# Patient Record
Sex: FEMALE | Race: WHITE | Employment: UNEMPLOYED | URBAN - METROPOLITAN AREA
[De-identification: names, ages, dates, MRNs, and addresses within clinical notes are randomized per-mention and may not be internally consistent; named-entity substitution may affect disease eponyms.]

---

## 2015-09-28 LAB — TSH SERPL-ACNC: 4.74 UIU/ML (ref 0.27–4.2)

## 2015-11-04 LAB — TSH SERPL-ACNC: 5.95 UIU/ML (ref 0.27–4.2)

## 2017-02-28 ENCOUNTER — TRANSFERRED RECORDS (OUTPATIENT)
Dept: HEALTH INFORMATION MANAGEMENT | Facility: CLINIC | Age: 52
End: 2017-02-28

## 2017-02-28 LAB
ALT SERPL-CCNC: 20 IU/L (ref 12–65)
AST SERPL-CCNC: 18 IU/L (ref 15–37)
CHOLEST SERPL-MCNC: 253 MG/DL (ref 0–199)
CREAT SERPL-MCNC: 0.73 MG/DL (ref 0.6–1.3)
GFR SERPL CREATININE-BSD FRML MDRD: >60 ML/MIN/1.73M2
GLUCOSE SERPL-MCNC: 91 MG/DL (ref 70–100)
HDLC SERPL-MCNC: 65 MG/DL (ref 40–59)
LDLC SERPL CALC-MCNC: 169 MG/DL
PHQ9 SCORE: 0
POTASSIUM SERPL-SCNC: 4.1 MMOL/L (ref 3.5–5.1)
TRIGL SERPL-MCNC: 96 MG/DL
TSH SERPL-ACNC: 2.13 UIU/ML (ref 0.36–3.74)

## 2017-10-04 ENCOUNTER — TRANSFERRED RECORDS (OUTPATIENT)
Dept: HEALTH INFORMATION MANAGEMENT | Facility: CLINIC | Age: 52
End: 2017-10-04

## 2018-02-21 ENCOUNTER — OFFICE VISIT (OUTPATIENT)
Dept: FAMILY MEDICINE | Facility: CLINIC | Age: 53
End: 2018-02-21
Payer: COMMERCIAL

## 2018-02-21 ENCOUNTER — HOSPITAL ENCOUNTER (OUTPATIENT)
Dept: GENERAL RADIOLOGY | Facility: CLINIC | Age: 53
Discharge: HOME | End: 2018-02-21
Attending: NURSE PRACTITIONER | Admitting: NURSE PRACTITIONER
Payer: COMMERCIAL

## 2018-02-21 ENCOUNTER — OFFICE VISIT (OUTPATIENT)
Dept: ORTHOPEDICS | Facility: CLINIC | Age: 53
End: 2018-02-21
Payer: COMMERCIAL

## 2018-02-21 ENCOUNTER — TELEPHONE (OUTPATIENT)
Dept: FAMILY MEDICINE | Facility: CLINIC | Age: 53
End: 2018-02-21

## 2018-02-21 VITALS
DIASTOLIC BLOOD PRESSURE: 80 MMHG | HEART RATE: 90 BPM | TEMPERATURE: 97.7 F | SYSTOLIC BLOOD PRESSURE: 126 MMHG | BODY MASS INDEX: 28.32 KG/M2 | WEIGHT: 150 LBS | OXYGEN SATURATION: 99 % | HEIGHT: 61 IN

## 2018-02-21 VITALS — HEIGHT: 61 IN | WEIGHT: 150 LBS | BODY MASS INDEX: 28.32 KG/M2 | TEMPERATURE: 97.7 F

## 2018-02-21 DIAGNOSIS — M75.81 RIGHT ROTATOR CUFF TENDINITIS: Primary | ICD-10-CM

## 2018-02-21 DIAGNOSIS — F32.5 MAJOR DEPRESSION IN COMPLETE REMISSION (H): ICD-10-CM

## 2018-02-21 DIAGNOSIS — M25.511 PAIN IN JOINT OF RIGHT SHOULDER: Primary | ICD-10-CM

## 2018-02-21 DIAGNOSIS — M25.511 PAIN IN JOINT OF RIGHT SHOULDER: ICD-10-CM

## 2018-02-21 DIAGNOSIS — E03.9 HYPOTHYROIDISM, UNSPECIFIED TYPE: ICD-10-CM

## 2018-02-21 LAB — TSH SERPL DL<=0.005 MIU/L-ACNC: 1.98 MU/L (ref 0.4–4)

## 2018-02-21 PROCEDURE — 84443 ASSAY THYROID STIM HORMONE: CPT | Performed by: NURSE PRACTITIONER

## 2018-02-21 PROCEDURE — 99204 OFFICE O/P NEW MOD 45 MIN: CPT | Mod: 25 | Performed by: ORTHOPAEDIC SURGERY

## 2018-02-21 PROCEDURE — 73030 X-RAY EXAM OF SHOULDER: CPT | Mod: TC

## 2018-02-21 PROCEDURE — 20610 DRAIN/INJ JOINT/BURSA W/O US: CPT | Mod: RT | Performed by: ORTHOPAEDIC SURGERY

## 2018-02-21 PROCEDURE — 99214 OFFICE O/P EST MOD 30 MIN: CPT | Performed by: NURSE PRACTITIONER

## 2018-02-21 PROCEDURE — 36415 COLL VENOUS BLD VENIPUNCTURE: CPT | Performed by: NURSE PRACTITIONER

## 2018-02-21 RX ORDER — SERTRALINE HYDROCHLORIDE 100 MG/1
TABLET, FILM COATED ORAL
COMMUNITY
Start: 2018-02-15 | End: 2018-05-14

## 2018-02-21 RX ORDER — LEVOTHYROXINE SODIUM 100 UG/1
100 TABLET ORAL
COMMUNITY
Start: 2018-02-15 | End: 2018-05-14

## 2018-02-21 RX ORDER — TRAMADOL HYDROCHLORIDE 50 MG/1
50-100 TABLET ORAL
Qty: 20 TABLET | Refills: 0 | Status: SHIPPED | OUTPATIENT
Start: 2018-02-21 | End: 2018-02-27

## 2018-02-21 ASSESSMENT — ANXIETY QUESTIONNAIRES
6. BECOMING EASILY ANNOYED OR IRRITABLE: NOT AT ALL
IF YOU CHECKED OFF ANY PROBLEMS ON THIS QUESTIONNAIRE, HOW DIFFICULT HAVE THESE PROBLEMS MADE IT FOR YOU TO DO YOUR WORK, TAKE CARE OF THINGS AT HOME, OR GET ALONG WITH OTHER PEOPLE: NOT DIFFICULT AT ALL
GAD7 TOTAL SCORE: 0
3. WORRYING TOO MUCH ABOUT DIFFERENT THINGS: NOT AT ALL
1. FEELING NERVOUS, ANXIOUS, OR ON EDGE: NOT AT ALL
7. FEELING AFRAID AS IF SOMETHING AWFUL MIGHT HAPPEN: NOT AT ALL
2. NOT BEING ABLE TO STOP OR CONTROL WORRYING: NOT AT ALL
5. BEING SO RESTLESS THAT IT IS HARD TO SIT STILL: NOT AT ALL

## 2018-02-21 ASSESSMENT — PATIENT HEALTH QUESTIONNAIRE - PHQ9: 5. POOR APPETITE OR OVEREATING: NOT AT ALL

## 2018-02-21 NOTE — PROGRESS NOTES
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  She enjoys riding horses and baling hay.  She has 4 horses.    She is being seen for   Chief Complaints and History of Present Illnesses   Patient presents with     Consult     rt shoulder pain          History of Present Illness:   Elisa Al is a 52 year old female who is seen in consultation at the request of Fanny Lisa APRN CNP.  History of Present illness:  Elisa presents for evaluation of:  1.) rt shoulder pain, lateral/posterior shoulder  Onset: for about 2 years, however his bone much worse the last 2 months.  Symptoms brought on by over use.  She also feels that around Cucumber time she fell forward onto her right hand and this is when the pain got worse.  Patient also remembers that approximately 2 years ago she partially dislocated her shoulder she thinks.  It went back in on its own.  Character:  sharp, dull ache, stabbing, burning, throbbing, numbness and radiation of pain down arm and into neck.    Progression of symptoms:  worse  As of 2 months ago.  Previous similar pain: YES.   Pain Level:  8/10.   Previous treatments:  ice and acetaminophen.  She feels that the ice does help and the Tylenol has not been helping.  She denies any treatment such as injections or therapy.  Currently on Blood thinners? no  Diagnosis of Diabetes? no    Additional History: Patient states that she gets catching and locking and also grinding.  Patient is having difficulty using her arm overhead and away from her body.  Patient does not have difficulty with any supination or using a screwdriver or opening a door.  Patient denies any numbness or tingling or any neck pain.  She is waking up at night  Because of the pain.    Patient's past medical, surgical, social and family histories reviewed.     Past Medical History:   Diagnosis Date     Benign neoplasm of other  specified sites of skin 4/6/2006    Finger - right third finger proximal palmar surface     Degenerative disk disease 12/18/12    anterior exposure of L4-L5 and L5-S1 disk spaces for fusion     Gout, unspecified      Other and unspecified ovarian cyst      Unspecified hypothyroidism years     Unspecified intestinal obstruction 02/02/2005    Partial bowel obstruction, Obstipation.         Past Surgical History:   Procedure Laterality Date     C LAP,ABD/PERIT/OMENTUM,UNLIST  04/12/10    pelvic adhesion     C TOTAL ABDOM HYSTERECTOMY  09/08/2004    TAHRSO, Cystoscopy.     CARPAL TUNNEL RELEASE RT/LT  2005    right wrist     EXPLORE SPINE, REMOVE HARDWARE, COMBINED  4/7/2014    Procedure: COMBINED EXPLORE SPINE, REMOVE HARDWARE;  Hardware Removal Lumbar 4-Sacral 1, Exploration of Fusion;  Surgeon: Brenton Ashford MD;  Location: [facility]     FUSION LUMBAR ANTERIOR THREE+ LEVELS       HC COLONOSCOPY W BIOPSY  04/26/10     HC COLONOSCOPY W/WO BRUSH/WASH  5/25/2005     HC EXCISION TENDON SHEATH LESION, HAND/FINGR  04/17/2006    Right middle finger flexor sheath MP joint.     HC EXCISION TENDON SHEATH LESION, HAND/FINGR  08/07/06    Right middle finger     HC LAPAROSCOPY, SURGICAL; CHOLECYSTECTOMY  4/12/2004    Cholecystectomy, Laparoscopic     HC REMOVAL OF OVARY/TUBE(S)      left ovary removed secondary to cyst     HC REMOVAL OF TONSILS,12+ Y/O      Tonsils 12+y.o.     HC REVISE MEDIAN N/CARPAL TUNNEL SURG  8/22/2005    Left     HC UGI ENDOSCOPY, SIMPLE EXAM  5/25/2005       Medications:    Current Outpatient Prescriptions on File Prior to Visit:  levothyroxine (SYNTHROID/LEVOTHROID) 100 MCG tablet Take 100 mcg by mouth   sertraline (ZOLOFT) 100 MG tablet    sertraline (ZOLOFT) 50 MG tablet    [DISCONTINUED] sertraline (ZOLOFT) 100 MG tablet TAKE ONE TABLET BY MOUTH ONCE DAILY   [DISCONTINUED] levothyroxine (SYNTHROID, LEVOTHROID) 50 MCG tablet TAKE ONE TABLET BY MOUTH ONCE DAILY   TYLENOL EXTRA STRENGTH 500 MG OR TABS  "2 tablets daily PRN     No current facility-administered medications on file prior to visit.     Allergies   Allergen Reactions     Aspirin Swelling     Erythromycin Ethylsuccinate GI Disturbance     gi upset     Ibuprofen Swelling     Naproxen      Zelnorm [Tegaserod Maleate]        Social History     Occupational History      [employer]     Social History Main Topics     Smoking status: Current Some Day Smoker     Years: 23.00     Smokeless tobacco: Former User     Quit date: 1/19/2013      Comment: one pack every 1 month-only on weekends     Alcohol use 0.0 oz/week      Comment: rarely     Drug use: No     Sexual activity: Yes     Partners: Male       Family History   Problem Relation Age of Onset     Blood Disease Mother      leukemia-passed 2008     Cardiovascular Father      triple bypass     Respiratory Father      Black Lung     Alzheimer Disease Father      CANCER Maternal Grandmother      ovarian/brain ca       REVIEW OF SYSTEMS  10 point review systems performed otherwise negative as noted as per history of present illness.    Physical Exam:  Vitals: Temp 97.7  F (36.5  C)  Ht 1.549 m (5' 1\")  Wt 68 kg (150 lb)  LMP 01/13/2004  BMI 28.34 kg/m2  BMI= Body mass index is 28.34 kg/(m^2).    Constitutional: healthy, alert and no acute distress   Psychiatric: mentation appears normal and affect normal/bright  NEURO: no focal deficits, CMS intact right upper extremity  RESP: Normal with easy respirations and no use of accessory muscles to breathe, no audible wheezing or retractions  CV: +2 radial pulse and her hand is warm to plapation.   SKIN: No erythema, rashes, excoriation, or breakdown. No evidence of infection.   MUSCULOSKELETAL:    INSPECTION of right shoulder: No gross deformities, erythema, edema, ecchymosis, atrophy or fasciculations.     PALPATION: No tenderness to palpation of the AC joint, proximal biceps tendon, clavicle, lateral shoulder, posterior shoulder, trapezius area. No increased " warmth noted.     ROM: Forward flexion to approximately 170 , abduction to approximately 170 , external rotation to approximately 70 , internal rotation to lumbar spine.  All range of motion is without catching, locking or pain.      STRENGTH: 5 out of 5 , deltoid as well as internal and external rotation     SPECIAL TEST: Patient has a negative Neer test, negative Rawls sign, negative cross over test, negative belly press and negative liftoff test, negative empty can and full can test, negative external rotator lag sign, negative O'lila's test, negative Crank test.  Negative speeds test  GAIT: non-antalgic  Lymph: no palpable lymph nodes    Diagnostic Modalities:  Recent Results (from the past 744 hour(s))   XR Shoulder Right G/E 3 Views    Narrative    RIGHT SHOULDER THREE OR MORE VIEWS   2/21/2018 10:13 AM     HISTORY:  Pain and limited ROM (range of motion) right shoulder.  History of overuse, heavy lifting. Pain in joint of right shoulder.    COMPARISON: None.     FINDINGS:  There is no fracture. The humeral head is well located  within the glenoid fossa. Glenohumeral, acromioclavicular, and  coracoclavicular spaces are well maintained. Visualized portions of  the adjacent lung are clear. Stimulator wires projected over the lower  thoracic spine on one of the images.      Impression    IMPRESSION:  Essentially negative right shoulder x-rays. If there is  clinical concern for rotator cuff or other soft tissue pathology,  further evaluation with MRI may be helpful.    PARESH MANZO MD     We agree with the above reading  Independent visualization of the images was performed.    Impression: 1.  Right shoulder rotator cuff tendinitis.    Plan:  All of the above pertinent physical exam and imaging modalities findings was reviewed with Elisa.                                          INJECTION PROCEDURE:  The patient was counseled about an  injection, including discussion of risks (including infection),  contents of the injection, rationale for performing the injection, and expected benefits of the injection. The skin was prepped with alcohol and betadine and then utilizing sterile technique an injection of the right shoulder subacromial space from the posterolateral approach  was performed. I used francia chloride spray prior to doing the injection. The injection consisted 1ml of Kenalog (40mg per 1ml) with 8ml 1% lidocaine plain. The patient tolerated the injection well, and there were no complications. The injection site was covered with a Band-Aid. The injection was performed by Slim Sagastume PA-C     Patient Instructions:  1.  Your x-rays look excellent  2.  On exam it does feel like your rotator cuff is irritated however I do not believe you have a rotator cuff tear because her strength is good.  3.  It is possible that you could have subluxed or partially dislocated her shoulder in the past however it seems like it is your rotator cuff that is the issue today.  4. We decided to do a cortisone injection today.  This is like putting very powerful ibuprofen right to the area it is needed.  We have information about the injection below.   5. We discussed the Mobic as a prescription anti-inflammatory medication you could also try but we decided to hold off on that today.    6.  Continue Tylenol and ice as needed.  7.  We have exercises below for you to try.  8.  We did 20 tablets of tramadol today to take before bed.    9.  You can followup with Di Zavala MD in 6 weeks, if you are having pain at that time we can do a cortisone injection.  You can always cancel the appointment if you are doing really well and follow-up as needed.   Re-x-ray on return: No    Scribed by Miguel Angel Sagastume PA-C on 2/21/2018 at 4:20 PM, based on Dr. Di Zavala's statements to me.    This note was dictated with [employer].    MILLIE Sánchez MD  [email or web address]

## 2018-02-21 NOTE — NURSING NOTE
"Chief Complaint   Patient presents with     Consult     rt shoulder pain        Initial Temp 97.7  F (36.5  C)  Ht 1.549 m (5' 1\")  Wt 68 kg (150 lb)  LMP 01/13/2004  BMI 28.34 kg/m2 Estimated body mass index is 28.34 kg/(m^2) as calculated from the following:    Height as of this encounter: 1.549 m (5' 1\").    Weight as of this encounter: 68 kg (150 lb).  Medication Reconciliation: complete    BP completed using cuff size: NA (Not Taken)    Sierra Layne MA      "

## 2018-02-21 NOTE — PATIENT INSTRUCTIONS
Encounter Diagnosis   Name Primary?     Right rotator cuff tendinitis Yes     Rest, ice and elevate above heart level as needed for pain control  1.  Your x-rays look excellent  2.  On exam it does feel like your rotator cuff is irritated however I do not believe you have a rotator cuff tear because her strength is good.  3.  It is possible that you could have subluxed or partially dislocated her shoulder in the past however it seems like it is your rotator cuff that is the issue today.  4. We decided to do a cortisone injection today.  This is like putting very powerful ibuprofen right to the area it is needed.  We have information about the injection below.   5. We discussed the Mobic as a prescription anti-inflammatory medication you could also try but we decided to hold off on that today.    6.  Continue Tylenol and ice as needed.  7.  We have exercises below for you to try.  8.  We did 20 tablets of tramadol today to take before bed.    9.  You can followup with Di Zavala MD in 6 weeks, if you are having pain at that time we can do a cortisone injection.  You can always cancel the appointment if you are doing really well and follow-up as needed.   Cortisone Instructions:     1. You received an injection of cortisone into your right shoulder today.  2. The joint(s) may be more painful for the first 1-2 days.  3. We ask you to continue to rest the joint(s) for a few more days before resuming regular activities.  4. Pain Medications you can take (as long as your primary care provider allows these meds and you do not have kidney or liver conditions):  Tylenol  Take 1000 mg by mouth every 6 hours as needed; maximum dose 4000 mg a day  Ibuprofen  600 mg every 6 hours as needed; maximum 2400 mg a day  (OK to take tylenol and ibuprofen at the same time)  5. Rest, ice and elevate as needed for pain control  6. Watch for these signs of infection: redness, swelling, drainage, warmth to touch, increased pain, or  fever. Call the clinic or make an appointment to be seen if you think you have an infection.  7. If you are diabetic, make sure you keep a close eye on your blood sugars, they can get elevated with cortisone injections.   8. Sometimes it can take 1-2 weeks for it to reach its full effect.    Cortisone Injections  Cortisone is a type of steroid. It can greatly reduce swelling, redness, and irritation (inflammation) and pain. Being injected with cortisone is simple and doesn t take long. Your doctor may ask you questions about your health. Certain health conditions, such as diabetes, can be affected by cortisone.     Your pain may be relieved by a cortisone injection.   Why have a cortisone injection?  Injecting cortisone can relieve pain for anything from a sports injury to arthritis. Your doctor may suggest an injection if rest, splints, or oral medicine doesn t relieve your pain. Injecting cortisone is simpler than having surgery. And cortisone may provide the lasting pain relief that can help you get out and enjoy life again.  Getting the injection  Your doctor will start by cleaning and occasionally numbing your skin at the injection site. Next you ll be injected with local anesthetics (for short-term pain relief) and cortisone. The injection may last a few moments. A small bandage will be put over the injection site. You ll then be ready to go home.  After your injection  After being injected, make sure you don t injure the treated region. But stay active. Enjoy a walk or some other mild activity. Just be careful not to strain the region that gave you trouble.  The next day  Some people feel more pain after being injected. This is normal, and it will go away soon. Applying ice for 20 minutes at a time to your injury may reduce the increased pain. Rest for the first day or two. You don t need to stay in bed. But avoid tasks that may strain the injured region.  If you have diabetes  Cortisone injections can cause  blood sugar to be increased for several days after the injection. If you have diabetes, you should follow your blood  sugar closely during this time. Follow your regular plan for what to do when your blood sugar is elevated.     4855-8449 [employer]. [address] [place] [address], [place] [address]. All rights reserved. This information is not intended as a substitute for professional medical care. Always follow your healthcare professional's instructions.     [employer] and [employer] may offer reliable information regarding your diagnosis and treatment plan.    THANK YOU for coming in today. If you receive a survey via [employer] or mail please let us know if there was anything you especially appreciated today or if there is any way we can improve our clinic. We appreciate your input.    GENERAL INFORMATION:  Our hours are:  Monday :     Clinic 7:30 AM-430 PM ([facility])  Tuesday:      Operating Room All Day ([facility])  Wednesday: Clinic 7:30 AM - 11:15 AM ([facility])             Clinic 1:00 PM - 4:00PM ([facility])  Thursday:     Administrative Day  Friday:          Clinic 7:30 AM - 11:15 AM ([facility])            Clinic 1:00 PM - 4:00 PM ([facility])      Bone and Joint Service Line for any issues or concerns: [phone]      We are not in the office Thursdays. Therefore non- urgent calls and medical messages received on Thursday will be addressed when we are back in the office on Wednesday. Urgent matters will be reviewed and addressed by one of our partners in the office as needed.    If lab work was done today as part of your evaluation you will generally be contacted via [employer], mail, or phone with the results within 1-5 days. If there is an alarming result we will contact you by phone. Lab results come back at varying times, I generally wait until all labs are  resulted before making comments on results. Please note labs are automatically released to [employer] (if you have signed up for it) once available-at times you may see these prior to my having a chance to review them as well.    If you need refills please contact your pharmacist. They will send a refill request to me to review. Please allow 3 business days for us to process all refill requests. All narcotic refills should be handled in the clinic at the time of your visit.  Understanding Rotator Cuff Injuries  The rotator cuff is a team of muscles and connecting tendons in the shoulder. It attaches your upper arm to your shoulder blade. Your rotator cuff helps you reach, throw, push, pull, and lift. Without it, your shoulder can't do its job properly.        Overuse tendonitis is irritation, bruising, or fraying of the rotator cuff.        A healthy rotator cuff  A healthy rotator cuff gives your shoulder flexibility and control. The rotator cuff holds your upper arm bone (humerus) in your shoulder socket (glenoid). It also helps move the shoulder.  A damaged rotator cuff  Pain and weakness told you that something was wrong with your shoulder. Now you know it s a rotator cuff problem. Rotator cuff tendons can become damaged or inflamed. This is called tendonitis. Possible causes include:    Irritation from overuse    Bursal inflammation (bursitis)    Pinching (impingement)    Calcium deposits (calcification)    Tears in the tendon.  Getting your shoulder healthy again  Care for your shoulder will most likely begin with nonsurgical treatments. You may start with simple rest. If needed, you may have injections that decrease inflammation and pain. Your healthcare provider will tell you how often you may need these treatments. If rest and injections relieve your pain, you will be given an exercise program. This will help restore your shoulder s strength and function. If your pain continues, your healthcare provider may  suggest surgery to repair the rotator cuff.    0406-8827 [employer]. [address] [place] [address], [place] [address]. All rights reserved. This information is not intended as a substitute for professional medical care. Always follow your healthcare professional's instructions.      Tendonitis  A tendon is the thick fibrous cord that joins muscle to bone and allows joints to move. When a tendon becomes inflamed, it is called tendonitis. This can occur from overuse, injury, or infection. This usually involves the shoulders, forearm, wrist, hands and foot. Symptoms include pain, swelling and tenderness to the touch. Moving the joint increases the pain.  It takes 4 to 6 weeks for tendonitis to heal. It is treated by preventing motion of the tendon with a splint or brace and the use of anti-inflammatory medicine.  Home care    Some people find relief with ice packs. These can be crushed or cubed ice in a plastic bag or a bag of frozen vegetables wrapped in a thin towel. Other people get better relief with heat. This can include a hot shower, hot bath, or a moist towel warmed in a microwave. Try each and use the method that feels best, for 15 to 20 minutes several times a day.    Rest the inflamed joint and protect it from movement.    You may use over-the-counter ibuprofen or naproxen to treat pain and inflammation, unless another medicine was prescribed. If you can't take these medicines, acetaminophen may help with the pain, but does not treat inflammation. If you have chronic liver or kidney disease or ever had a stomach ulcer or gastrointestinal bleeding, talk with your doctor before using these medicines.    As your symptoms improve, begin gradual motion at the involved joint.  Follow-up care  Follow up with your healthcare provider if you are not improving after 5 days of treatment.  When to seek medical advice  Call your healthcare provider right away if any of these occur:    Redness over the painful  area    Increasing pain or swelling at the joint    Fever (1 degree above your normal temperature) lasting 24 to 48 hours Or, whatever your healthcare provider told you to report based on your condition    [id number] [employer]. [address] [place] [address], [place] [address]. All rights reserved. This information is not intended as a substitute for professional medical care. Always follow your healthcare professional's instructions.      Exercises for Shoulder Flexibility: External Rotation  This stretch can help restore shoulder flexibility and relieve pain over time. When stretching, be sure to breathe deeply. Follow any special instructions from your doctor or physical therapist:     a doorway. Grasp the doorjamb with the hand on the frozen side. Your arm should be bent.    With the other hand, hold the elbow on the frozen side firmly against your body.    Standing in the same spot, rotate your body away from the doorjamb. Stop when you feel the stretch in the shoulder. At first, try to hold the stretch for 5 seconds.    Work up to doing 3 sets of this stretch, 3 times a day. Work up to holding the stretch for 30 to 60 seconds.  Note: Keep your arms as still as you can. Over time, rotate your body a little more to enhance the stretch. But be careful not to twist your back.        Exercises for Shoulder Flexibility: Internal Rotation    This stretch can help restore shoulder flexibility and relieve pain over time. When stretching, be sure to breathe deeply. Follow any special instructions from your healthcare provider or physical therapist.    While seated, move the arm on the side you want to stretch toward the middle of your back. The palm of your hand should face out.    Cup your other hand under the hand that s behind your back. Gently push your cupped hand upward until you feel the stretch in the shoulder. Try to hold the stretch for 5 seconds.    Work up to doing 3 sets of this  stretch, 3 times a day. Work up to holding the stretch for 30 to 60 seconds.  Note: Keep your back straight. It s OK if your hand can t reach the middle of your back. Instead, start the stretch with your hand as close as you can get it to the middle of your back.      Exercises for Shoulder Flexibility: Wall Walk  Improving your flexibility can reduce pain. Stretching exercises also can help increase your range of pain-free motion. Breathe normally when you exercise. Use smooth, fluid movements.  Note: Follow any special instructions you are given. If you feel pain, stop the exercise. If the pain continues after stopping, call your healthcare provider:    Stand with your shoulder about 2 feet from the wall.    Raise your arm to shoulder level and gently  walk  your fingers up the wall as high as you can.    Hold for a few seconds. Then walk your fingers back down.    Repeat 3 times. Move closer to the wall as you repeat.    Build up to holding each stretch for 30 seconds.  Caution: Do this stretch only if your healthcare provider recommends it. Don t do it when you are first injured.            9197-3130 [employer]. [address] [place], [place] [id number]. All rights reserved. This information is not intended as a substitute for professional medical care. Always follow your healthcare professional's instructions.    Shoulder Exercises: External Rotation  Strengthening exercises help make your injured shoulder more stable. To warm up, do flexibility (stretching) exercises first. Your healthcare provider will tell you what size hand weights to use for the strengthening exercise below. If you don t have hand weights, try using cans of soup instead:    Lie on your uninjured side with your head supported by a pillow or your arm. Place a small rolled-up towel under your top elbow.    Grasp a hand weight with your top hand and bend that arm to a right angle, resting your forearm against your  stomach.    Keeping your elbow against the towel, slowly lift the weight until your forearm is slightly higher than your elbow. Return to the starting position. Repeat.    Work up to 5 to 15 lifts.    4074-2351 [employer]. [address] [place] [address]. All rights reserved. This information is not intended as a substitute for professional medical care. Always follow your healthcare professional's instructions.        Shoulder Exercises: Internal Rotation    Strengthening exercises help make your injured shoulder more stable. To warm up, do flexibility (stretching) exercises first. Your healthcare provider will tell you what size hand weights to use for the strengthening exercise below. If you don t have hand weights, try using cans of soup instead:    With knees bent, lie on a firm surface. Using the hand on the same side as your injured shoulder, grasp a weight. Bend that arm to a right angle (90 degrees).    Rest your elbow on the floor.    Keeping your elbow next to your side, lower your forearm toward the floor, away from your body. Do not lower your hand all the way to the floor.    Slowly return your forearm to your side. Repeat.    Work up to 5 to 15 lifts.     Note: Support your head and neck with a pillow.     [id number] [employer]. [address] [place] [address]. All rights reserved. This information is not intended as a substitute for professional medical care. Always follow your healthcare professional's instructions.        Shoulder Exercises: Side Raise    This exercise stretches and strengthens your shoulders. Before starting, read through all the instructions. During the exercise, breathe normally and use smooth movements. Stop if you feel any pain. If pain persists, call your healthcare provider.    Stand straight, holding a ____ pound weight in each hand, arms at sides, feet shoulder-width apart.    Slowly extend your arms up and out until weights  are at shoulder level. Slowly return to starting position.    Repeat ____ times. Do ____ sets ____ times a day.     CAUTION: Don t swing the weights or raise weights above shoulder level.     0309-3122 [employer]. [address] [place] [address], [place] [address]. All rights reserved. This information is not intended as a substitute for professional medical care. Always follow your healthcare professional's instructions.

## 2018-02-21 NOTE — TELEPHONE ENCOUNTER
----- Message from FLORENCIA Bonds CNP sent at 2/21/2018  3:52 PM CST -----  Please let the patient know her x-ray of the shoulder was essentially negative.  There is no dislocation or fracture.  No calcific tendinitis or bursitis.  I suspect probable rotator cuff injury.  Follow-up with Ortho as discussed in clinic

## 2018-02-21 NOTE — TELEPHONE ENCOUNTER
Patient called back and info was given.  Thank you,  Nicky Jacobs   for [facility]

## 2018-02-21 NOTE — PROGRESS NOTES
Elisa Al is a 52 year old female who is seen in consultation at the request of Fanny Lisa, APRN CNP.  History of Present illness:  Elisa presents for evaluation of:  1.) rt shoulder pain   Onset: for about 2 years  Symptoms brought on by over use.   Character:  sharp, dull ache, stabbing, burning, throbbing, numbness and radiation of pain down arm and into neck.    Progression of symptoms:  worse.    Previous similar pain: YES.   Pain Level:  8/10.   Previous treatments:  ice and acetaminophen.  Currently on Blood thinners? no  Diagnosis of Diabetes? no
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
## 2018-02-21 NOTE — MR AVS SNAPSHOT
After Visit Summary   2/21/2018    Elisa Al    MRN: [id number]           Patient Information     Date Of Birth          1965        Visit Information        Provider Department      2/21/2018 3:00 PM Di Zavala MD [facility]        Today's Diagnoses     Right rotator cuff tendinitis    -  1      Care Instructions    Encounter Diagnosis   Name Primary?     Right rotator cuff tendinitis Yes     Rest, ice and elevate above heart level as needed for pain control  1.  Your x-rays look excellent  2.  On exam it does feel like your rotator cuff is irritated however I do not believe you have a rotator cuff tear because her strength is good.  3.  It is possible that you could have subluxed or partially dislocated her shoulder in the past however it seems like it is your rotator cuff that is the issue today.  4. We decided to do a cortisone injection today.  This is like putting very powerful ibuprofen right to the area it is needed.  We have information about the injection below.   5. We discussed the Mobic as a prescription anti-inflammatory medication you could also try but we decided to hold off on that today.    6.  Continue Tylenol and ice as needed.  7.  We have exercises below for you to try.  8. You can followup with Di Zavala MD in 6 weeks, if you are having pain at that time we can do a cortisone injection.  You can always cancel the appointment if you are doing really well and follow-up as needed.   Cortisone Instructions:     1. You received an injection of cortisone into your right shoulder today.  2. The joint(s) may be more painful for the first 1-2 days.  3. We ask you to continue to rest the joint(s) for a few more days before resuming regular activities.  4. Pain Medications you can take (as long as your primary care provider allows these meds and you do not have kidney or liver conditions):  Tylenol  Take 1000 mg by mouth every 6 hours as needed;  maximum dose 4000 mg a day  Ibuprofen  600 mg every 6 hours as needed; maximum 2400 mg a day  (OK to take tylenol and ibuprofen at the same time)  5. Rest, ice and elevate as needed for pain control  6. Watch for these signs of infection: redness, swelling, drainage, warmth to touch, increased pain, or fever. Call the clinic or make an appointment to be seen if you think you have an infection.  7. If you are diabetic, make sure you keep a close eye on your blood sugars, they can get elevated with cortisone injections.   8. Sometimes it can take 1-2 weeks for it to reach its full effect.    Cortisone Injections  Cortisone is a type of steroid. It can greatly reduce swelling, redness, and irritation (inflammation) and pain. Being injected with cortisone is simple and doesn t take long. Your doctor may ask you questions about your health. Certain health conditions, such as diabetes, can be affected by cortisone.     Your pain may be relieved by a cortisone injection.   Why have a cortisone injection?  Injecting cortisone can relieve pain for anything from a sports injury to arthritis. Your doctor may suggest an injection if rest, splints, or oral medicine doesn t relieve your pain. Injecting cortisone is simpler than having surgery. And cortisone may provide the lasting pain relief that can help you get out and enjoy life again.  Getting the injection  Your doctor will start by cleaning and occasionally numbing your skin at the injection site. Next you ll be injected with local anesthetics (for short-term pain relief) and cortisone. The injection may last a few moments. A small bandage will be put over the injection site. You ll then be ready to go home.  After your injection  After being injected, make sure you don t injure the treated region. But stay active. Enjoy a walk or some other mild activity. Just be careful not to strain the region that gave you trouble.  The next day  Some people feel more pain after being  injected. This is normal, and it will go away soon. Applying ice for 20 minutes at a time to your injury may reduce the increased pain. Rest for the first day or two. You don t need to stay in bed. But avoid tasks that may strain the injured region.  If you have diabetes  Cortisone injections can cause blood sugar to be increased for several days after the injection. If you have diabetes, you should follow your blood  sugar closely during this time. Follow your regular plan for what to do when your blood sugar is elevated.     8900-9522 [employer]. [address] [place] [address]. All rights reserved. This information is not intended as a substitute for professional medical care. Always follow your healthcare professional's instructions.     [employer] and [employer] may offer reliable information regarding your diagnosis and treatment plan.    THANK YOU for coming in today. If you receive a survey via [employer] or mail please let us know if there was anything you especially appreciated today or if there is any way we can improve our clinic. We appreciate your input.    GENERAL INFORMATION:  Our hours are:  Monday :     Clinic 7:30 AM-430 PM ([facility])  Tuesday:      Operating Room All Day ([facility])  Wednesday: Clinic 7:30 AM - 11:15 AM ([facility])             Clinic 1:00 PM - 4:00PM ([facility])  Thursday:     Administrative Day  Friday:          Clinic 7:30 AM - 11:15 AM ([facility])            Clinic 1:00 PM - 4:00 PM ([facility])      Bone and Joint Service Line for any issues or concerns: [phone]      We are not in the office Thursdays. Therefore non- urgent calls and medical messages received on Thursday will be addressed when we are back in the office on Wednesday. Urgent matters will be reviewed and addressed by one of our partners in the office as  needed.    If lab work was done today as part of your evaluation you will generally be contacted via [employer], mail, or phone with the results within 1-5 days. If there is an alarming result we will contact you by phone. Lab results come back at varying times, I generally wait until all labs are resulted before making comments on results. Please note labs are automatically released to [employer] (if you have signed up for it) once available-at times you may see these prior to my having a chance to review them as well.    If you need refills please contact your pharmacist. They will send a refill request to me to review. Please allow 3 business days for us to process all refill requests. All narcotic refills should be handled in the clinic at the time of your visit.  Understanding Rotator Cuff Injuries  The rotator cuff is a team of muscles and connecting tendons in the shoulder. It attaches your upper arm to your shoulder blade. Your rotator cuff helps you reach, throw, push, pull, and lift. Without it, your shoulder can't do its job properly.        Overuse tendonitis is irritation, bruising, or fraying of the rotator cuff.        A healthy rotator cuff  A healthy rotator cuff gives your shoulder flexibility and control. The rotator cuff holds your upper arm bone (humerus) in your shoulder socket (glenoid). It also helps move the shoulder.  A damaged rotator cuff  Pain and weakness told you that something was wrong with your shoulder. Now you know it s a rotator cuff problem. Rotator cuff tendons can become damaged or inflamed. This is called tendonitis. Possible causes include:    Irritation from overuse    Bursal inflammation (bursitis)    Pinching (impingement)    Calcium deposits (calcification)    Tears in the tendon.  Getting your shoulder healthy again  Care for your shoulder will most likely begin with nonsurgical treatments. You may start with simple rest. If needed, you may have injections that decrease  inflammation and pain. Your healthcare provider will tell you how often you may need these treatments. If rest and injections relieve your pain, you will be given an exercise program. This will help restore your shoulder s strength and function. If your pain continues, your healthcare provider may suggest surgery to repair the rotator cuff.    8783-9357 [employer] [address] [place] [address]. All rights reserved. This information is not intended as a substitute for professional medical care. Always follow your healthcare professional's instructions.      Tendonitis  A tendon is the thick fibrous cord that joins muscle to bone and allows joints to move. When a tendon becomes inflamed, it is called tendonitis. This can occur from overuse, injury, or infection. This usually involves the shoulders, forearm, wrist, hands and foot. Symptoms include pain, swelling and tenderness to the touch. Moving the joint increases the pain.  It takes 4 to 6 weeks for tendonitis to heal. It is treated by preventing motion of the tendon with a splint or brace and the use of anti-inflammatory medicine.  Home care    Some people find relief with ice packs. These can be crushed or cubed ice in a plastic bag or a bag of frozen vegetables wrapped in a thin towel. Other people get better relief with heat. This can include a hot shower, hot bath, or a moist towel warmed in a microwave. Try each and use the method that feels best, for 15 to 20 minutes several times a day.    Rest the inflamed joint and protect it from movement.    You may use over-the-counter ibuprofen or naproxen to treat pain and inflammation, unless another medicine was prescribed. If you can't take these medicines, acetaminophen may help with the pain, but does not treat inflammation. If you have chronic liver or kidney disease or ever had a stomach ulcer or gastrointestinal bleeding, talk with your doctor before using these medicines.    As  your symptoms improve, begin gradual motion at the involved joint.  Follow-up care  Follow up with your healthcare provider if you are not improving after 5 days of treatment.  When to seek medical advice  Call your healthcare provider right away if any of these occur:    Redness over the painful area    Increasing pain or swelling at the joint    Fever (1 degree above your normal temperature) lasting 24 to 48 hours Or, whatever your healthcare provider told you to report based on your condition    7634-4975 [employer]. [address] [place] [address], [place] [id number]. All rights reserved. This information is not intended as a substitute for professional medical care. Always follow your healthcare professional's instructions.      Exercises for Shoulder Flexibility: External Rotation  This stretch can help restore shoulder flexibility and relieve pain over time. When stretching, be sure to breathe deeply. Follow any special instructions from your doctor or physical therapist:     a doorway. Grasp the doorjamb with the hand on the frozen side. Your arm should be bent.    With the other hand, hold the elbow on the frozen side firmly against your body.    Standing in the same spot, rotate your body away from the doorjamb. Stop when you feel the stretch in the shoulder. At first, try to hold the stretch for 5 seconds.    Work up to doing 3 sets of this stretch, 3 times a day. Work up to holding the stretch for 30 to 60 seconds.  Note: Keep your arms as still as you can. Over time, rotate your body a little more to enhance the stretch. But be careful not to twist your back.        Exercises for Shoulder Flexibility: Internal Rotation    This stretch can help restore shoulder flexibility and relieve pain over time. When stretching, be sure to breathe deeply. Follow any special instructions from your healthcare provider or physical therapist.    While seated, move the arm on the side you want to stretch  toward the middle of your back. The palm of your hand should face out.    Cup your other hand under the hand that s behind your back. Gently push your cupped hand upward until you feel the stretch in the shoulder. Try to hold the stretch for 5 seconds.    Work up to doing 3 sets of this stretch, 3 times a day. Work up to holding the stretch for 30 to 60 seconds.  Note: Keep your back straight. It s OK if your hand can t reach the middle of your back. Instead, start the stretch with your hand as close as you can get it to the middle of your back.      Exercises for Shoulder Flexibility: Wall Walk  Improving your flexibility can reduce pain. Stretching exercises also can help increase your range of pain-free motion. Breathe normally when you exercise. Use smooth, fluid movements.  Note: Follow any special instructions you are given. If you feel pain, stop the exercise. If the pain continues after stopping, call your healthcare provider:    Stand with your shoulder about 2 feet from the wall.    Raise your arm to shoulder level and gently  walk  your fingers up the wall as high as you can.    Hold for a few seconds. Then walk your fingers back down.    Repeat 3 times. Move closer to the wall as you repeat.    Build up to holding each stretch for 30 seconds.  Caution: Do this stretch only if your healthcare provider recommends it. Don t do it when you are first injured.            1743-2435 [employer]. [address] [place] [address], [place] [address]. All rights reserved. This information is not intended as a substitute for professional medical care. Always follow your healthcare professional's instructions.    Shoulder Exercises: External Rotation  Strengthening exercises help make your injured shoulder more stable. To warm up, do flexibility (stretching) exercises first. Your healthcare provider will tell you what size hand weights to use for the strengthening exercise below. If you don t have hand weights,  try using cans of soup instead:    Lie on your uninjured side with your head supported by a pillow or your arm. Place a small rolled-up towel under your top elbow.    Grasp a hand weight with your top hand and bend that arm to a right angle, resting your forearm against your stomach.    Keeping your elbow against the towel, slowly lift the weight until your forearm is slightly higher than your elbow. Return to the starting position. Repeat.    Work up to 5 to 15 lifts.    0617-4119 [employer]. [address] [place] [address]. All rights reserved. This information is not intended as a substitute for professional medical care. Always follow your healthcare professional's instructions.        Shoulder Exercises: Internal Rotation    Strengthening exercises help make your injured shoulder more stable. To warm up, do flexibility (stretching) exercises first. Your healthcare provider will tell you what size hand weights to use for the strengthening exercise below. If you don t have hand weights, try using cans of soup instead:    With knees bent, lie on a firm surface. Using the hand on the same side as your injured shoulder, grasp a weight. Bend that arm to a right angle (90 degrees).    Rest your elbow on the floor.    Keeping your elbow next to your side, lower your forearm toward the floor, away from your body. Do not lower your hand all the way to the floor.    Slowly return your forearm to your side. Repeat.    Work up to 5 to 15 lifts.     Note: Support your head and neck with a pillow.     2000-1979 [employer]. [address] [place] [address]. All rights reserved. This information is not intended as a substitute for professional medical care. Always follow your healthcare professional's instructions.        Shoulder Exercises: Side Raise    This exercise stretches and strengthens your shoulders. Before starting, read through all the instructions. During the  "exercise, breathe normally and use smooth movements. Stop if you feel any pain. If pain persists, call your healthcare provider.    Stand straight, holding a ____ pound weight in each hand, arms at sides, feet shoulder-width apart.    Slowly extend your arms up and out until weights are at shoulder level. Slowly return to starting position.    Repeat ____ times. Do ____ sets ____ times a day.     CAUTION: Don t swing the weights or raise weights above shoulder level.     0472-6199 [employer]. [address] [place] [address]. All rights reserved. This information is not intended as a substitute for professional medical care. Always follow your healthcare professional's instructions.                  Follow-ups after your visit        Future tests that were ordered for you today     Open Future Orders        Priority Expected Expires Ordered    XR Shoulder Right G/E 3 Views Routine 2/21/2018 2/21/2019 2/21/2018            Who to contact     If you have questions or need follow up information about today's clinic visit or your schedule please contact [facility] directly at [phone].  Normal or non-critical lab and imaging results will be communicated to you by [employer], letter or phone within 4 business days after the clinic has received the results. If you do not hear from us within 7 days, please contact the clinic through [employer] or phone. If you have a critical or abnormal lab result, we will notify you by phone as soon as possible.  Submit refill requests through [employer] or call your pharmacy and they will forward the refill request to us. Please allow 3 business days for your refill to be completed.          Additional Information About Your Visit        [employer] Information     [employer] lets you send messages to your doctor, view your test results, renew your prescriptions, schedule appointments and more. To sign up, go to [email or web address] . Click on \"Log in\" on " "the left side of the screen, which will take you to the Welcome page. Then click on \"Sign up Now\" on the right side of the page.     You will be asked to enter the access code listed below, as well as some personal information. Please follow the directions to create your username and password.     Your access code is: [email or web address]  Expires: 5/15/2018  4:40 PM     Your access code will  in 90 days. If you need help or a new code, please call your [facility] clinic or [phone].        Care EveryWhere ID     This is your Care EveryWhere ID. This could be used by other organizations to access your [facility] medical records  [id number]        Your Vitals Were     Temperature Height Last Period BMI (Body Mass Index)          97.7  F (36.5  C) 1.549 m (5' 1\") 2004 28.34 kg/m2         Blood Pressure from Last 3 Encounters:   18 126/80   14 132/84   14 101/70    Weight from Last 3 Encounters:   18 68 kg (150 lb)   18 68 kg (150 lb)   14 64.9 kg (143 lb)              Today, you had the following     No orders found for display       Primary Care Provider Office Phone # Fax #    Camilo Schafer [phone]       [facility] [address] [place] [address]        Equal Access to Services     [facility]: Hadii wayne ku hadasho Sobarbara, waaxda luqadaha, qaybta kaalmada adeegyada, candie alcantara. So [facility] [phone].    ATENCIÓN: Si habla español, tiene a parisi disposición servicios gratuitos de asistencia lingüística. Llame al [phone].    We comply with applicable federal civil rights laws and [place] laws. We do not discriminate on the basis of race, color, national origin, age, disability, sex, sexual orientation, or gender identity.            Thank you!     Thank you for choosing [facility]  for your care. Our goal is always to provide you with excellent care. Hearing back from our patients is " one way we can continue to improve our services. Please take a few minutes to complete the written survey that you may receive in the mail after your visit with us. Thank you!             Your Updated Medication List - Protect others around you: Learn how to safely use, store and throw away your medicines at www.disposemymeds.org.          This list is accurate as of 2/21/18  4:17 PM.  Always use your most recent med list.                   Brand Name Dispense Instructions for use Diagnosis    levothyroxine 100 MCG tablet    SYNTHROID/LEVOTHROID     Take 100 mcg by mouth        * sertraline 100 MG tablet    ZOLOFT          * sertraline 50 MG tablet    ZOLOFT          TYLENOL 500 MG tablet   Generic drug:  acetaminophen     60    2 tablets daily PRN        * Notice:  This list has 2 medication(s) that are the same as other medications prescribed for you. Read the directions carefully, and ask your doctor or other care provider to review them with you.

## 2018-02-21 NOTE — MR AVS SNAPSHOT
After Visit Summary   2/21/2018    Elisa Al    MRN: [id number]           Patient Information     Date Of Birth          1965        Visit Information        Provider Department      2/21/2018 9:00 AM Fanny Lisa APRN CNP [facility]        Today's Diagnoses     Pain in joint of right shoulder    -  1    Hypothyroidism, unspecified type        Major depression in complete remission (H)           Follow-ups after your visit        Additional Services     ORTHOPEDICS ADULT REFERRAL       Your provider has referred you to: [facility]: [facility] [phone]   [email or web address]    Please be aware that coverage of these services is subject to the terms and limitations of your health insurance plan.  Call member services at your health plan with any benefit or coverage questions.      Please bring the following to your appointment:    >>   Any x-rays, CTs or MRIs which have been performed.  Contact the facility where they were done to arrange for  prior to your scheduled appointment.    >>   List of current medications   >>   This referral request   >>   Any documents/labs given to you for this referral                  Your next 10 appointments already scheduled     Feb 21, 2018  3:00 PM CST   New Visit with Di Zavala MD   [facility] ([facility])    [address] [address] [phone]              Future tests that were ordered for you today     Open Future Orders        Priority Expected Expires Ordered    XR Shoulder Right 2 Views Routine 2/21/2018 2/21/2019 2/21/2018            Who to contact     If you have questions or need follow up information about today's clinic visit or your schedule please contact [facility] directly at [phone].  Normal or non-critical lab and imaging results will be communicated to you by Amadeo  "letter or phone within 4 business days after the clinic has received the results. If you do not hear from us within 7 days, please contact the clinic through Zilico or phone. If you have a critical or abnormal lab result, we will notify you by phone as soon as possible.  Submit refill requests through [employer] or call your pharmacy and they will forward the refill request to us. Please allow 3 business days for your refill to be completed.          Additional Information About Your Visit        Zilico Information     Zilico lets you send messages to your doctor, view your test results, renew your prescriptions, schedule appointments and more. To sign up, go to [email or web address] . Click on \"Log in\" on the left side of the screen, which will take you to the Welcome page. Then click on \"Sign up Now\" on the right side of the page.     You will be asked to enter the access code listed below, as well as some personal information. Please follow the directions to create your username and password.     Your access code is: [email or web address]  Expires: 5/15/2018  4:40 PM     Your access code will  in 90 days. If you need help or a new code, please call your [place] clinic or [phone].        Care EveryWhere ID     This is your Care EveryWhere ID. This could be used by other organizations to access your [place] medical records  [id number]        Your Vitals Were     Pulse Temperature Height Last Period Pulse Oximetry BMI (Body Mass Index)    90 97.7  F (36.5  C) (Temporal) 5' 1\" (1.549 m) 2004 99% 28.34 kg/m2       Blood Pressure from Last 3 Encounters:   18 126/80   14 132/84   14 101/70    Weight from Last 3 Encounters:   18 150 lb (68 kg)   14 143 lb (64.9 kg)   04/10/14 135 lb (61.2 kg)              We Performed the Following     ORTHOPEDICS ADULT REFERRAL     TSH with free T4 reflex        Primary Care Provider Office Phone # Fax #    Camilo Schafer [phone] " [phone]       [facility] [address] [place] [address]        Equal Access to Services     CIPRIANO JUAN : Hadii aad ku hadmananalex Vickie, wahortenciada luqadaha, qacruzta kaalmada corby, candie tonin hayaaroxanne pickeringsherita livingston emanuel alcantara. So [facility] [phone].    ATENCIÓN: Si habla español, tiene a parisi disposición servicios gratuitos de asistencia lingüística. Llame al [phone].    We comply with applicable federal civil rights laws and [place] laws. We do not discriminate on the basis of race, color, national origin, age, disability, sex, sexual orientation, or gender identity.            Thank you!     Thank you for choosing [facility]  for your care. Our goal is always to provide you with excellent care. Hearing back from our patients is one way we can continue to improve our services. Please take a few minutes to complete the written survey that you may receive in the mail after your visit with us. Thank you!             Your Updated Medication List - Protect others around you: Learn how to safely use, store and throw away your medicines at www.disposemymeds.org.          This list is accurate as of 2/21/18  9:47 AM.  Always use your most recent med list.                   Brand Name Dispense Instructions for use Diagnosis    levothyroxine 100 MCG tablet    SYNTHROID/LEVOTHROID     Take 100 mcg by mouth        * sertraline 100 MG tablet    ZOLOFT          * sertraline 50 MG tablet    ZOLOFT          TYLENOL 500 MG tablet   Generic drug:  acetaminophen     60    2 tablets daily PRN        * Notice:  This list has 2 medication(s) that are the same as other medications prescribed for you. Read the directions carefully, and ask your doctor or other care provider to review them with you.

## 2018-02-21 NOTE — LETTER
2/21/2018         RE: Elisa Al  [address] [address]        Dear Colleague,    Thank you for referring your patient, Elisa Al, to the [facility]. Please see a copy of my visit note below.    Elisa Al is a 52 year old female who is seen in consultation at the request of Fanny Lisa APRN CNP.  History of Present illness:  Elisa presents for evaluation of:  1.) rt shoulder pain   Onset: for about 2 years  Symptoms brought on by over use.   Character:  sharp, dull ache, stabbing, burning, throbbing, numbness and radiation of pain down arm and into neck.    Progression of symptoms:  worse.    Previous similar pain: YES.   Pain Level:  8/10.   Previous treatments:  ice and acetaminophen.  Currently on Blood thinners? no  Diagnosis of Diabetes? 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  She enjoys riding horses and baling hay.  She has 4 horses.    She is being seen for   Chief Complaints and History of Present Illnesses   Patient presents with     Consult     rt shoulder pain          History of Present Illness:   Elisa Al is a 52 year old female who is seen in consultation at the request of Fanny Lisa, APRN CNP.  History of Present illness:  Elisa presents for evaluation of:  1.) rt shoulder pain, lateral/posterior shoulder  Onset: for about 2 years, however his bone much worse the last 2 months.  Symptoms brought on by over use.  She also feels that around Dahlia time she fell forward onto her right hand and this is when the pain got worse.  Patient also remembers that approximately 2 years ago she partially dislocated her shoulder she thinks.  It went back in on its own.  Character:  sharp, dull ache, stabbing, burning, throbbing, numbness and radiation of pain down arm and into neck.    Progression of symptoms:  worse  As  of 2 months ago.  Previous similar pain: YES.   Pain Level:  8/10.   Previous treatments:  ice and acetaminophen.  She feels that the ice does help and the Tylenol has not been helping.  She denies any treatment such as injections or therapy.  Currently on Blood thinners? no  Diagnosis of Diabetes? no    Additional History: Patient states that she gets catching and locking and also grinding.  Patient is having difficulty using her arm overhead and away from her body.  Patient does not have difficulty with any supination or using a screwdriver or opening a door.  Patient denies any numbness or tingling or any neck pain.  She is waking up at night  Because of the pain.    Patient's past medical, surgical, social and family histories reviewed.     Past Medical History:   Diagnosis Date     Benign neoplasm of other specified sites of skin 4/6/2006    Finger - right third finger proximal palmar surface     Degenerative disk disease 12/18/12    anterior exposure of L4-L5 and L5-S1 disk spaces for fusion     Gout, unspecified      Other and unspecified ovarian cyst      Unspecified hypothyroidism years     Unspecified intestinal obstruction 02/02/2005    Partial bowel obstruction, Obstipation.         Past Surgical History:   Procedure Laterality Date     C LAP,ABD/PERIT/OMENTUM,UNLIST  04/12/10    pelvic adhesion     C TOTAL ABDOM HYSTERECTOMY  09/08/2004    TAHRSO, Cystoscopy.     CARPAL TUNNEL RELEASE RT/LT  2005    right wrist     EXPLORE SPINE, REMOVE HARDWARE, COMBINED  4/7/2014    Procedure: COMBINED EXPLORE SPINE, REMOVE HARDWARE;  Hardware Removal Lumbar 4-Sacral 1, Exploration of Fusion;  Surgeon: Brenton Ashford MD;  Location: [facility] OR     FUSION LUMBAR ANTERIOR THREE+ LEVELS       HC COLONOSCOPY W BIOPSY  04/26/10     HC COLONOSCOPY W/WO BRUSH/WASH  5/25/2005     HC EXCISION TENDON SHEATH LESION, HAND/FINGR  04/17/2006    Right middle finger flexor sheath MP joint.     HC EXCISION TENDON SHEATH LESION,  HAND/FINGR  08/07/06    Right middle finger     HC LAPAROSCOPY, SURGICAL; CHOLECYSTECTOMY  4/12/2004    Cholecystectomy, Laparoscopic     HC REMOVAL OF OVARY/TUBE(S)      left ovary removed secondary to cyst     HC REMOVAL OF TONSILS,12+ Y/O      Tonsils 12+y.o.     HC REVISE MEDIAN N/CARPAL TUNNEL SURG  8/22/2005    Left     HC UGI ENDOSCOPY, SIMPLE EXAM  5/25/2005       Medications:    Current Outpatient Prescriptions on File Prior to Visit:  levothyroxine (SYNTHROID/LEVOTHROID) 100 MCG tablet Take 100 mcg by mouth   sertraline (ZOLOFT) 100 MG tablet    sertraline (ZOLOFT) 50 MG tablet    [DISCONTINUED] sertraline (ZOLOFT) 100 MG tablet TAKE ONE TABLET BY MOUTH ONCE DAILY   [DISCONTINUED] levothyroxine (SYNTHROID, LEVOTHROID) 50 MCG tablet TAKE ONE TABLET BY MOUTH ONCE DAILY   TYLENOL EXTRA STRENGTH 500 MG OR TABS 2 tablets daily PRN     No current facility-administered medications on file prior to visit.     Allergies   Allergen Reactions     Aspirin Swelling     Erythromycin Ethylsuccinate GI Disturbance     gi upset     Ibuprofen Swelling     Naproxen      Zelnorm [Tegaserod Maleate]        Social History     Occupational History      [email or web address]     Social History Main Topics     Smoking status: Current Some Day Smoker     Years: 23.00     Smokeless tobacco: Former User     Quit date: 1/19/2013      Comment: one pack every 1 month-only on weekends     Alcohol use 0.0 oz/week      Comment: rarely     Drug use: No     Sexual activity: Yes     Partners: Male       Family History   Problem Relation Age of Onset     Blood Disease Mother      leukemia-passed 2008     Cardiovascular Father      triple bypass     Respiratory Father      Black Lung     Alzheimer Disease Father      CANCER Maternal Grandmother      ovarian/brain ca       REVIEW OF SYSTEMS  10 point review systems performed otherwise negative as noted as per history of present illness.    Physical Exam:  Vitals: Temp 97.7  F (36.5  C)  Ht 1.549 m  "(5' 1\")  Wt 68 kg (150 lb)  LMP 01/13/2004  BMI 28.34 kg/m2  BMI= Body mass index is 28.34 kg/(m^2).    Constitutional: healthy, alert and no acute distress   Psychiatric: mentation appears normal and affect normal/bright  NEURO: no focal deficits, CMS intact right upper extremity  RESP: Normal with easy respirations and no use of accessory muscles to breathe, no audible wheezing or retractions  CV: +2 radial pulse and her hand is warm to plapation.   SKIN: No erythema, rashes, excoriation, or breakdown. No evidence of infection.   MUSCULOSKELETAL:    INSPECTION of right shoulder: No gross deformities, erythema, edema, ecchymosis, atrophy or fasciculations.     PALPATION: No tenderness to palpation of the AC joint, proximal biceps tendon, clavicle, lateral shoulder, posterior shoulder, trapezius area. No increased warmth noted.     ROM: Forward flexion to approximately 170 , abduction to approximately 170 , external rotation to approximately 70 , internal rotation to lumbar spine.  All range of motion is without catching, locking or pain.      STRENGTH: 5 out of 5 , deltoid as well as internal and external rotation     SPECIAL TEST: Patient has a negative Neer test, negative Rawls sign, negative cross over test, negative belly press and negative liftoff test, negative empty can and full can test, negative external rotator lag sign, negative O'lila's test, negative Crank test.  Negative speeds test  GAIT: non-antalgic  Lymph: no palpable lymph nodes    Diagnostic Modalities:  Recent Results (from the past 744 hour(s))   XR Shoulder Right G/E 3 Views    Narrative    RIGHT SHOULDER THREE OR MORE VIEWS   2/21/2018 10:13 AM     HISTORY:  Pain and limited ROM (range of motion) right shoulder.  History of overuse, heavy lifting. Pain in joint of right shoulder.    COMPARISON: None.     FINDINGS:  There is no fracture. The humeral head is well located  within the glenoid fossa. Glenohumeral, acromioclavicular, " and  coracoclavicular spaces are well maintained. Visualized portions of  the adjacent lung are clear. Stimulator wires projected over the lower  thoracic spine on one of the images.      Impression    IMPRESSION:  Essentially negative right shoulder x-rays. If there is  clinical concern for rotator cuff or other soft tissue pathology,  further evaluation with MRI may be helpful.    PARESH MANZO MD     We agree with the above reading  Independent visualization of the images was performed.    Impression: 1.  Right shoulder rotator cuff tendinitis.    Plan:  All of the above pertinent physical exam and imaging modalities findings was reviewed with Elisa.                                          INJECTION PROCEDURE:  The patient was counseled about an  injection, including discussion of risks (including infection), contents of the injection, rationale for performing the injection, and expected benefits of the injection. The skin was prepped with alcohol and betadine and then utilizing sterile technique an injection of the right shoulder subacromial space from the posterolateral approach  was performed. I used francia chloride spray prior to doing the injection. The injection consisted 1ml of Kenalog (40mg per 1ml) with 8ml 1% lidocaine plain. The patient tolerated the injection well, and there were no complications. The injection site was covered with a Band-Aid. The injection was performed by Slim Sagastume PA-C     Patient Instructions:  1.  Your x-rays look excellent  2.  On exam it does feel like your rotator cuff is irritated however I do not believe you have a rotator cuff tear because her strength is good.  3.  It is possible that you could have subluxed or partially dislocated her shoulder in the past however it seems like it is your rotator cuff that is the issue today.  4. We decided to do a cortisone injection today.  This is like putting very powerful ibuprofen right to the area it is needed.  We have  information about the injection below.   5. We discussed the Mobic as a prescription anti-inflammatory medication you could also try but we decided to hold off on that today.    6.  Continue Tylenol and ice as needed.  7.  We have exercises below for you to try.  8.  We did 20 tablets of tramadol today to take before bed.    9.  You can followup with Di Zavala MD in 6 weeks, if you are having pain at that time we can do a cortisone injection.  You can always cancel the appointment if you are doing really well and follow-up as needed.   Re-x-ray on return: No    Scribed by Miguel Angel Sagastume PA-C on 2/21/2018 at 4:20 PM, based on Dr. Di Zavala's statements to me.    This note was dictated with [email or web address].    MILLIE Sánchez MD  juliette    Again, thank you for allowing me to participate in the care of your patient.        Sincerely,        Di Zavala MD

## 2018-02-22 ASSESSMENT — PATIENT HEALTH QUESTIONNAIRE - PHQ9: SUM OF ALL RESPONSES TO PHQ QUESTIONS 1-9: 0

## 2018-02-22 ASSESSMENT — ANXIETY QUESTIONNAIRES: GAD7 TOTAL SCORE: 0

## 2018-02-27 DIAGNOSIS — M75.81 RIGHT ROTATOR CUFF TENDINITIS: ICD-10-CM

## 2018-02-27 RX ORDER — TRAMADOL HYDROCHLORIDE 50 MG/1
50-100 TABLET ORAL EVERY 12 HOURS PRN
Qty: 40 TABLET | Refills: 0 | Status: SHIPPED | OUTPATIENT
Start: 2018-02-27 | End: 2018-03-07

## 2018-02-27 NOTE — TELEPHONE ENCOUNTER
I called the patient, she was taking 2 in the AM and 2 in the PM.  I refilled the Rx.  She feels the injection has not worked yet.  I told her to give it some more time.  Rx signed and given to [facility].

## 2018-03-07 ENCOUNTER — TELEPHONE (OUTPATIENT)
Dept: ORTHOPEDICS | Facility: CLINIC | Age: 53
End: 2018-03-07

## 2018-03-07 DIAGNOSIS — M75.81 RIGHT ROTATOR CUFF TENDINITIS: ICD-10-CM

## 2018-03-07 RX ORDER — TRAMADOL HYDROCHLORIDE 50 MG/1
50-100 TABLET ORAL EVERY 12 HOURS PRN
Qty: 40 TABLET | Refills: 0 | Status: SHIPPED | OUTPATIENT
Start: 2018-03-07 | End: 2018-03-15

## 2018-03-07 NOTE — TELEPHONE ENCOUNTER
I spoke with pt. She is willing to do whatever MD suggests.  Dr. Zavala were you intending to try PT first, then Mobic and then MRI?  Pt had these concerns: with Mobic-she has had swelling in whole body with Ibuprofen and Naproxen                                         With MRI-she has had spinal fusion L4-S1, and also has spinal stimulator in place with two long wires, the fusion did not help  Pt would like [facility] Tr[facility] if able. [facility] VERNA Lucio

## 2018-03-07 NOTE — TELEPHONE ENCOUNTER
Please order PT, stay away from NSAIDs and mobic, can have CT arthrogram if she wishes, please reorder the ultram.

## 2018-03-07 NOTE — TELEPHONE ENCOUNTER
Reason for Call:  Other call back    Detailed comments: Right shoulder not better after injection.  Patient asking what to do next.  Please call    Phone Number Patient can be reached at: Home number on file [phone] (home)    Best Time: any    Can we leave a detailed message on this number? YES    Call taken on 3/7/2018 at 8:10 AM by Ada Barron

## 2018-03-07 NOTE — TELEPHONE ENCOUNTER
Orders are in to sign.  Pt would like to try PT first and see how that goes before any diagnostics. VERNA Lucio

## 2018-03-15 DIAGNOSIS — M75.81 RIGHT ROTATOR CUFF TENDINITIS: ICD-10-CM

## 2018-03-15 NOTE — TELEPHONE ENCOUNTER
Tramadol       Last Written Prescription Date:  3/7/18  Last Fill Quantity: 40,   # refills: 0  Last Office Visit: 2/21/18  Future Office visit:       Routing refill request to provider for review/approval because:  Drug not on the [facility], P or [facility] refill protocol or controlled substance

## 2018-03-16 RX ORDER — TRAMADOL HYDROCHLORIDE 50 MG/1
50-100 TABLET ORAL EVERY 12 HOURS PRN
Qty: 40 TABLET | Refills: 0 | Status: SHIPPED | OUTPATIENT
Start: 2018-03-16 | End: 2018-03-26

## 2018-03-26 DIAGNOSIS — M75.81 RIGHT ROTATOR CUFF TENDINITIS: ICD-10-CM

## 2018-03-26 RX ORDER — TRAMADOL HYDROCHLORIDE 50 MG/1
50-100 TABLET ORAL EVERY 12 HOURS PRN
Qty: 40 TABLET | Refills: 0 | Status: SHIPPED | OUTPATIENT
Start: 2018-03-26 | End: 2018-04-03

## 2018-03-26 NOTE — TELEPHONE ENCOUNTER
Tramadol   50 MG    Last Written Prescription Date:  3/16/18  Last Fill Quantity: 40,   # refills: 0  Last Office Visit: 2/21/17  Future Office visit:       Routing refill request to provider for review/approval because:  Drug not on the [facility], P or [facility] refill protocol or controlled substance

## 2018-03-28 ENCOUNTER — HOSPITAL ENCOUNTER (OUTPATIENT)
Dept: PHYSICAL THERAPY | Facility: CLINIC | Age: 53
Setting detail: THERAPIES SERIES
End: 2018-03-28
Attending: ORTHOPAEDIC SURGERY
Payer: COMMERCIAL

## 2018-03-28 PROCEDURE — 97530 THERAPEUTIC ACTIVITIES: CPT | Mod: GP

## 2018-03-28 PROCEDURE — 40000718 ZZHC STATISTIC PT DEPARTMENT ORTHO VISIT

## 2018-03-28 PROCEDURE — 97162 PT EVAL MOD COMPLEX 30 MIN: CPT | Mod: GP
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
## 2018-04-03 DIAGNOSIS — M75.81 RIGHT ROTATOR CUFF TENDINITIS: ICD-10-CM

## 2018-04-03 RX ORDER — TRAMADOL HYDROCHLORIDE 50 MG/1
50-100 TABLET ORAL EVERY 12 HOURS PRN
Qty: 40 TABLET | Refills: 0 | Status: SHIPPED | OUTPATIENT
Start: 2018-04-03 | End: 2018-04-13

## 2018-04-03 NOTE — TELEPHONE ENCOUNTER
Tramadol      Last Written Prescription Date:  3/26/2018  Last Fill Quantity: 40,   # refills: 0  Last Office Visit: 2/21/2018  Future Office visit:       Routing refill request to provider for review/approval because:  Drug not on the [facility], [facility] or [facility] refill protocol or controlled substance

## 2018-04-09 ENCOUNTER — HOSPITAL ENCOUNTER (OUTPATIENT)
Dept: PHYSICAL THERAPY | Facility: CLINIC | Age: 53
Setting detail: THERAPIES SERIES
End: 2018-04-09
Attending: ORTHOPAEDIC SURGERY
Payer: COMMERCIAL

## 2018-04-09 PROCEDURE — 40000718 ZZHC STATISTIC PT DEPARTMENT ORTHO VISIT

## 2018-04-09 PROCEDURE — 97140 MANUAL THERAPY 1/> REGIONS: CPT | Mod: GP

## 2018-04-09 PROCEDURE — 97530 THERAPEUTIC ACTIVITIES: CPT | Mod: GP

## 2018-04-11 ENCOUNTER — HOSPITAL ENCOUNTER (OUTPATIENT)
Dept: PHYSICAL THERAPY | Facility: CLINIC | Age: 53
Setting detail: THERAPIES SERIES
End: 2018-04-11
Attending: ORTHOPAEDIC SURGERY
Payer: COMMERCIAL

## 2018-04-11 PROCEDURE — 97140 MANUAL THERAPY 1/> REGIONS: CPT | Mod: GP

## 2018-04-11 PROCEDURE — 40000718 ZZHC STATISTIC PT DEPARTMENT ORTHO VISIT

## 2018-04-11 PROCEDURE — 97110 THERAPEUTIC EXERCISES: CPT | Mod: GP

## 2018-04-11 PROCEDURE — 97530 THERAPEUTIC ACTIVITIES: CPT | Mod: GP,59

## 2018-04-12 DIAGNOSIS — M75.81 RIGHT ROTATOR CUFF TENDINITIS: ICD-10-CM

## 2018-04-12 RX ORDER — TRAMADOL HYDROCHLORIDE 50 MG/1
50-100 TABLET ORAL EVERY 12 HOURS PRN
Qty: 40 TABLET | Refills: 0 | Status: CANCELLED | OUTPATIENT
Start: 2018-04-12

## 2018-04-12 NOTE — TELEPHONE ENCOUNTER
Tramadol       Last Written Prescription Date:  4/3/18  Last Fill Quantity: 40,   # refills: 0  Last Office Visit: 2/21/18  Future Office visit:       Routing refill request to provider for review/approval because:  Drug not on the [facility], P or [facility] refill protocol or controlled substance

## 2018-04-13 RX ORDER — TRAMADOL HYDROCHLORIDE 50 MG/1
50-100 TABLET ORAL EVERY 12 HOURS PRN
Qty: 40 TABLET | Refills: 0 | Status: SHIPPED | OUTPATIENT
Start: 2018-04-13 | End: 2018-04-19

## 2018-04-13 NOTE — TELEPHONE ENCOUNTER
Elisa states she wanted this refill request to go to [facility].  Pt states she needs this Tramadol prescription to get her through her physical therapy appts.  Pt states she has physical therapy for her shoulder through May.

## 2018-04-18 ENCOUNTER — HOSPITAL ENCOUNTER (OUTPATIENT)
Dept: PHYSICAL THERAPY | Facility: CLINIC | Age: 53
Setting detail: THERAPIES SERIES
End: 2018-04-18
Attending: ORTHOPAEDIC SURGERY
Payer: COMMERCIAL

## 2018-04-18 PROCEDURE — 40000718 ZZHC STATISTIC PT DEPARTMENT ORTHO VISIT

## 2018-04-18 PROCEDURE — 97140 MANUAL THERAPY 1/> REGIONS: CPT | Mod: GP

## 2018-04-18 PROCEDURE — 97530 THERAPEUTIC ACTIVITIES: CPT | Mod: GP

## 2018-04-18 PROCEDURE — 97110 THERAPEUTIC EXERCISES: CPT | Mod: GP

## 2018-04-19 DIAGNOSIS — M75.81 RIGHT ROTATOR CUFF TENDINITIS: ICD-10-CM

## 2018-04-20 RX ORDER — TRAMADOL HYDROCHLORIDE 50 MG/1
50-100 TABLET ORAL EVERY 12 HOURS PRN
Qty: 40 TABLET | Refills: 0 | Status: SHIPPED | OUTPATIENT
Start: 2018-04-20 | End: 2018-05-01

## 2018-04-20 NOTE — TELEPHONE ENCOUNTER
I will refill the tramadol today.  However, she needs to follow-up with Dr. Zavala for further evaluation and management, since it is not advisable to continue taking Ultram indefinitely on a regular basis.

## 2018-04-20 NOTE — TELEPHONE ENCOUNTER
Tramadol 50 MG       Last Written Prescription Date:  4/13/18  Last Fill Quantity: 40,   # refills: 0  Last Office Visit: 2/21/18  Future Office visit:       Routing refill request to provider for review/approval because:  Drug not on the [facility], P or [facility] refill protocol or controlled substance

## 2018-04-25 ENCOUNTER — HOSPITAL ENCOUNTER (OUTPATIENT)
Dept: PHYSICAL THERAPY | Facility: CLINIC | Age: 53
Setting detail: THERAPIES SERIES
End: 2018-04-25
Attending: ORTHOPAEDIC SURGERY
Payer: COMMERCIAL

## 2018-04-25 PROCEDURE — 40000718 ZZHC STATISTIC PT DEPARTMENT ORTHO VISIT

## 2018-04-25 PROCEDURE — 97110 THERAPEUTIC EXERCISES: CPT | Mod: GP

## 2018-05-01 ENCOUNTER — TELEPHONE (OUTPATIENT)
Dept: FAMILY MEDICINE | Facility: CLINIC | Age: 53
End: 2018-05-01

## 2018-05-01 DIAGNOSIS — M75.81 RIGHT ROTATOR CUFF TENDINITIS: ICD-10-CM

## 2018-05-01 RX ORDER — TRAMADOL HYDROCHLORIDE 50 MG/1
50-100 TABLET ORAL EVERY 12 HOURS PRN
Qty: 40 TABLET | Refills: 0 | Status: SHIPPED | OUTPATIENT
Start: 2018-05-01 | End: 2018-05-14

## 2018-05-01 NOTE — TELEPHONE ENCOUNTER
Tramadol   50 MG     Last Written Prescription Date:  4/20/18  Last Fill Quantity: 40,   # refills: 0  Last Office Visit: 2/21/18  Future Office visit:       Routing refill request to provider for review/approval because:  Drug not on the [facility], [facility] or [facility] refill protocol or controlled substance

## 2018-05-02 ENCOUNTER — HOSPITAL ENCOUNTER (OUTPATIENT)
Dept: PHYSICAL THERAPY | Facility: CLINIC | Age: 53
Setting detail: THERAPIES SERIES
End: 2018-05-02
Attending: ORTHOPAEDIC SURGERY
Payer: COMMERCIAL

## 2018-05-02 PROCEDURE — 40000718 ZZHC STATISTIC PT DEPARTMENT ORTHO VISIT

## 2018-05-02 PROCEDURE — 97110 THERAPEUTIC EXERCISES: CPT | Mod: GP

## 2018-05-02 PROCEDURE — 97112 NEUROMUSCULAR REEDUCATION: CPT | Mod: GP

## 2018-05-02 NOTE — TELEPHONE ENCOUNTER
Patient is out of medication today and is wondering if this could be done today.  She has PHYSICAL THERAPY today at 9 and will check then.  Thanks.

## 2018-05-08 ENCOUNTER — APPOINTMENT (OUTPATIENT)
Dept: CT IMAGING | Facility: CLINIC | Age: 53
End: 2018-05-08
Attending: EMERGENCY MEDICINE
Payer: COMMERCIAL

## 2018-05-08 ENCOUNTER — HOSPITAL ENCOUNTER (EMERGENCY)
Facility: CLINIC | Age: 53
Discharge: HOME | End: 2018-05-08
Attending: EMERGENCY MEDICINE | Admitting: EMERGENCY MEDICINE
Payer: COMMERCIAL

## 2018-05-08 ENCOUNTER — TELEPHONE (OUTPATIENT)
Dept: FAMILY MEDICINE | Facility: CLINIC | Age: 53
End: 2018-05-08

## 2018-05-08 VITALS
BODY MASS INDEX: 27.16 KG/M2 | RESPIRATION RATE: 16 BRPM | HEART RATE: 96 BPM | TEMPERATURE: 97.7 F | SYSTOLIC BLOOD PRESSURE: 143 MMHG | WEIGHT: 143.74 LBS | DIASTOLIC BLOOD PRESSURE: 79 MMHG | OXYGEN SATURATION: 96 %

## 2018-05-08 DIAGNOSIS — S22.41XA CLOSED FRACTURE OF MULTIPLE RIBS OF RIGHT SIDE, INITIAL ENCOUNTER: ICD-10-CM

## 2018-05-08 LAB
ANION GAP SERPL CALCULATED.3IONS-SCNC: 5 MMOL/L (ref 3–14)
BASOPHILS # BLD AUTO: 0 10E9/L (ref 0–0.2)
BASOPHILS NFR BLD AUTO: 0.3 %
BUN SERPL-MCNC: 10 MG/DL (ref 7–30)
CALCIUM SERPL-MCNC: 8.5 MG/DL (ref 8.5–10.1)
CHLORIDE SERPL-SCNC: 109 MMOL/L (ref 94–109)
CO2 SERPL-SCNC: 28 MMOL/L (ref 20–32)
CREAT SERPL-MCNC: 0.53 MG/DL (ref 0.52–1.04)
DIFFERENTIAL METHOD BLD: NORMAL
EOSINOPHIL # BLD AUTO: 0.1 10E9/L (ref 0–0.7)
EOSINOPHIL NFR BLD AUTO: 1.8 %
ERYTHROCYTE [DISTWIDTH] IN BLOOD BY AUTOMATED COUNT: 13.2 % (ref 10–15)
GFR SERPL CREATININE-BSD FRML MDRD: >90 ML/MIN/1.7M2
GLUCOSE SERPL-MCNC: 89 MG/DL (ref 70–99)
HCT VFR BLD AUTO: 41.9 % (ref 35–47)
HGB BLD-MCNC: 13.9 G/DL (ref 11.7–15.7)
IMM GRANULOCYTES # BLD: 0 10E9/L (ref 0–0.4)
IMM GRANULOCYTES NFR BLD: 0.3 %
LYMPHOCYTES # BLD AUTO: 1.5 10E9/L (ref 0.8–5.3)
LYMPHOCYTES NFR BLD AUTO: 20.8 %
MCH RBC QN AUTO: 28.6 PG (ref 26.5–33)
MCHC RBC AUTO-ENTMCNC: 33.2 G/DL (ref 31.5–36.5)
MCV RBC AUTO: 86 FL (ref 78–100)
MONOCYTES # BLD AUTO: 0.5 10E9/L (ref 0–1.3)
MONOCYTES NFR BLD AUTO: 6.6 %
NEUTROPHILS # BLD AUTO: 5.1 10E9/L (ref 1.6–8.3)
NEUTROPHILS NFR BLD AUTO: 70.2 %
PLATELET # BLD AUTO: 230 10E9/L (ref 150–450)
POTASSIUM SERPL-SCNC: 4 MMOL/L (ref 3.4–5.3)
RBC # BLD AUTO: 4.86 10E12/L (ref 3.8–5.2)
SODIUM SERPL-SCNC: 142 MMOL/L (ref 133–144)
WBC # BLD AUTO: 7.3 10E9/L (ref 4–11)

## 2018-05-08 PROCEDURE — 76775 US EXAM ABDO BACK WALL LIM: CPT | Performed by: EMERGENCY MEDICINE

## 2018-05-08 PROCEDURE — 80048 BASIC METABOLIC PNL TOTAL CA: CPT | Performed by: EMERGENCY MEDICINE

## 2018-05-08 PROCEDURE — 99285 EMERGENCY DEPT VISIT HI MDM: CPT | Mod: 25 | Performed by: EMERGENCY MEDICINE

## 2018-05-08 PROCEDURE — 25000128 H RX IP 250 OP 636: Performed by: EMERGENCY MEDICINE

## 2018-05-08 PROCEDURE — 71260 CT THORAX DX C+: CPT

## 2018-05-08 PROCEDURE — 76775 US EXAM ABDO BACK WALL LIM: CPT | Mod: 26 | Performed by: EMERGENCY MEDICINE

## 2018-05-08 PROCEDURE — 96376 TX/PRO/DX INJ SAME DRUG ADON: CPT | Mod: 59 | Performed by: EMERGENCY MEDICINE

## 2018-05-08 PROCEDURE — 72125 CT NECK SPINE W/O DYE: CPT

## 2018-05-08 PROCEDURE — 96374 THER/PROPH/DIAG INJ IV PUSH: CPT | Mod: 59 | Performed by: EMERGENCY MEDICINE

## 2018-05-08 PROCEDURE — 25000125 ZZHC RX 250: Performed by: EMERGENCY MEDICINE

## 2018-05-08 PROCEDURE — 85025 COMPLETE CBC W/AUTO DIFF WBC: CPT | Performed by: EMERGENCY MEDICINE

## 2018-05-08 RX ORDER — HYDROMORPHONE HYDROCHLORIDE 1 MG/ML
0.5 INJECTION, SOLUTION INTRAMUSCULAR; INTRAVENOUS; SUBCUTANEOUS
Status: DISCONTINUED | OUTPATIENT
Start: 2018-05-08 | End: 2018-05-08 | Stop reason: HOSPADM

## 2018-05-08 RX ORDER — IOPAMIDOL 755 MG/ML
500 INJECTION, SOLUTION INTRAVASCULAR ONCE
Status: COMPLETED | OUTPATIENT
Start: 2018-05-08 | End: 2018-05-08

## 2018-05-08 RX ORDER — OXYCODONE AND ACETAMINOPHEN 5; 325 MG/1; MG/1
1-2 TABLET ORAL EVERY 4 HOURS PRN
Qty: 20 TABLET | Refills: 0 | Status: SHIPPED | OUTPATIENT
Start: 2018-05-08 | End: 2018-12-12

## 2018-05-08 RX ADMIN — SODIUM CHLORIDE 60 ML: 9 INJECTION, SOLUTION INTRAVENOUS at 10:42

## 2018-05-08 RX ADMIN — HYDROMORPHONE HYDROCHLORIDE 0.5 MG: 1 INJECTION, SOLUTION INTRAMUSCULAR; INTRAVENOUS; SUBCUTANEOUS at 11:31

## 2018-05-08 RX ADMIN — HYDROMORPHONE HYDROCHLORIDE 0.5 MG: 1 INJECTION, SOLUTION INTRAMUSCULAR; INTRAVENOUS; SUBCUTANEOUS at 10:32

## 2018-05-08 RX ADMIN — IOPAMIDOL 70 ML: 755 INJECTION, SOLUTION INTRAVENOUS at 10:41

## 2018-05-08 NOTE — ED PROVIDER NOTES
"  History     Chief Complaint   Patient presents with     Fall     HPI  Elisa Al is a 52 year old female who comes in today for right sided pain that has increased since a fall off of a horse on Sunday. She was riding bareback when the horse spooked and she fell down onto the ground of a hay field. She did not hit any rocks. She hit the whole right side of her body including her head. She had no loss of consciousness. Her  came back to pick her up after her horse ran back to their house and she did not have any pain at that time and actually went back out on the horse with a saddle. She later began to feel pain all along the right side of her body. Her main symptom now is pain with breathing. She has sharp stabbing pains along her right side that \"lock her up\" when she tried to take a deep breath. She feels short of breath. She has a cough as well but no fever. She does not have a headache and has no vision changes. She had a feeling of lightheadedness yesterday as well as a feeling of \"floating along\" which is a typical post-concussive feeling for her that she has had with previous concussions. She denies any confusion. She has some neck tenderness as well but no decreased range of motion. She also has pain on her buttocks but denies a bruise and has full ROM of her leg as well. She also has pain in her right lower back. She denies any abdominal pain or changes in her urine. She says she hasn't been urinating as much but may also be drinking less as she does not have much of an appetite.     Problem List:    Patient Active Problem List    Diagnosis Date Noted     Hypothyroidism, unspecified type 02/21/2018     Priority: Medium     Major depression in complete remission (H) 02/21/2018     Priority: Medium     CARDIOVASCULAR SCREENING; LDL GOAL LESS THAN 160 10/31/2010     Priority: Medium     Pain in joint, shoulder region 12/05/2006     Priority: Medium        Past Medical History:    Past Medical " History:   Diagnosis Date     Benign neoplasm of other specified sites of skin 4/6/2006     Degenerative disk disease 12/18/12     Gout, unspecified      Other and unspecified ovarian cyst      Unspecified hypothyroidism years     Unspecified intestinal obstruction 02/02/2005       Past Surgical History:    Past Surgical History:   Procedure Laterality Date     C LAP,ABD/PERIT/OMENTUM,UNLIST  04/12/10    pelvic adhesion     C TOTAL ABDOM HYSTERECTOMY  09/08/2004    TAHRSO, Cystoscopy.     CARPAL TUNNEL RELEASE RT/LT  2005    right wrist     EXPLORE SPINE, REMOVE HARDWARE, COMBINED  4/7/2014    Procedure: COMBINED EXPLORE SPINE, REMOVE HARDWARE;  Hardware Removal Lumbar 4-Sacral 1, Exploration of Fusion;  Surgeon: Brenton Ashford MD;  Location: [facility]     FUSION LUMBAR ANTERIOR THREE+ LEVELS       HC COLONOSCOPY W BIOPSY  04/26/10     HC COLONOSCOPY W/WO BRUSH/WASH  5/25/2005     HC EXCISION TENDON SHEATH LESION, HAND/FINGR  04/17/2006    Right middle finger flexor sheath MP joint.     HC EXCISION TENDON SHEATH LESION, HAND/FINGR  08/07/06    Right middle finger     HC LAPAROSCOPY, SURGICAL; CHOLECYSTECTOMY  4/12/2004    Cholecystectomy, Laparoscopic     HC REMOVAL OF OVARY/TUBE(S)      left ovary removed secondary to cyst     HC REMOVAL OF TONSILS,12+ Y/O      Tonsils 12+y.o.     HC REVISE MEDIAN N/CARPAL TUNNEL SURG  8/22/2005    Left     HC UGI ENDOSCOPY, SIMPLE EXAM  5/25/2005       Family History:    Family History   Problem Relation Age of Onset     Blood Disease Mother      leukemia-passed 2008     Cardiovascular Father      triple bypass     Respiratory Father      Black Lung     Alzheimer Disease Father      CANCER Maternal Grandmother      ovarian/brain ca       Social History:  Marital Status:   [2]  Social History   Substance Use Topics     Smoking status: Current Some Day Smoker     Packs/day: 0.50     Years: 23.00     Smokeless tobacco: Former User     Quit date: 1/19/2013      Comment: one  pack every 1 month-only on weekends     Alcohol use 0.0 oz/week      Comment: nightly        Medications:      levothyroxine (SYNTHROID/LEVOTHROID) 100 MCG tablet   oxyCODONE-acetaminophen (PERCOCET) 5-325 MG per tablet   sertraline (ZOLOFT) 100 MG tablet   sertraline (ZOLOFT) 50 MG tablet   traMADol (ULTRAM) 50 MG tablet   TYLENOL EXTRA STRENGTH 500 MG OR TABS         Review of Systems   All other systems reviewed and are negative.      Physical Exam   BP: (!) 134/110  Pulse: 96  Heart Rate: 96  Temp: 97.7  F (36.5  C)  Resp: 16  Weight: 65.2 kg (143 lb 11.8 oz)  SpO2: 99 %      Physical Exam   Constitutional: She is oriented to person, place, and time.   HENT:   Head: Normocephalic.   Eyes: EOM are normal. Pupils are equal, round, and reactive to light.   Neck: Normal range of motion.   Cardiovascular: Normal rate and normal heart sounds.  Exam reveals no gallop and no friction rub.    No murmur heard.  Pulmonary/Chest: She has no wheezes. She has no rales. She exhibits tenderness.   CTA.  ttp over the right lateral chest wall without bruising   Abdominal: Soft. She exhibits no distension. There is no tenderness. There is no rebound and no guarding.   Musculoskeletal: Normal range of motion. She exhibits tenderness. She exhibits no edema or deformity.   Neurological: She is alert and oriented to person, place, and time.   Skin: Skin is warm. No erythema.   No ecchymoses   Psychiatric: She has a normal mood and affect. Her behavior is normal. Judgment and thought content normal.       ED Course     ED Course     Procedures         Critical Care time:  none    Results for orders placed or performed during the hospital encounter of 05/08/18 (from the past 24 hour(s))   POC US ABDOMEN LIMITED    Impression    Bedside FAST (Focused Assessment with Sonography in Trauma), performed and interpreted by me.   Indication: Trauma    With the patient in Trendelenburg, the RUQ, LUQ and subxiphoid views were examined for  intraabdominal and thoracic free fluid and pericardial effusion. With the patient in reverse Trendelenburg, the suprapubic view was examined for intraabdominal free fluid. Image quality was satisfactory..     Findings: There is no evidence of free fluid above or below bilateral diaphragms, in the splenorenal or hepatorenal space, or in bilateral paracolic gutters. There was no free fluid seen in the pelvis adjacent to the urinary bladder. There is no free fluid within the pericardium.   Extended FAST exam (eFAST):   Indication: Trauma   The chest wall was evaluated for evidence of pneumothorax.     Right side:  Lung sliding artifact  Present     Comet tail artifacts or B-lines  absent   Left side:  Lung sliding artifact  Present     Comet tail artifacts or B-lines Absent     IMPRESSION:  Negative FAST      IMPRESSION: Grossly normal left ventricular function and chamber size.  No pericardial effusion..     CBC with platelets differential   Result Value Ref Range    WBC 7.3 4.0 - 11.0 10e9/L    RBC Count 4.86 3.8 - 5.2 10e12/L    Hemoglobin 13.9 11.7 - 15.7 g/dL    Hematocrit 41.9 35.0 - 47.0 %    MCV 86 78 - 100 fl    MCH 28.6 26.5 - 33.0 pg    MCHC 33.2 31.5 - 36.5 g/dL    RDW 13.2 10.0 - 15.0 %    Platelet Count 230 150 - 450 10e9/L    Diff Method Automated Method     % Neutrophils 70.2 %    % Lymphocytes 20.8 %    % Monocytes 6.6 %    % Eosinophils 1.8 %    % Basophils 0.3 %    % Immature Granulocytes 0.3 %    Absolute Neutrophil 5.1 1.6 - 8.3 10e9/L    Absolute Lymphocytes 1.5 0.8 - 5.3 10e9/L    Absolute Monocytes 0.5 0.0 - 1.3 10e9/L    Absolute Eosinophils 0.1 0.0 - 0.7 10e9/L    Absolute Basophils 0.0 0.0 - 0.2 10e9/L    Abs Immature Granulocytes 0.0 0 - 0.4 10e9/L   Basic metabolic panel   Result Value Ref Range    Sodium 142 133 - 144 mmol/L    Potassium 4.0 3.4 - 5.3 mmol/L    Chloride 109 94 - 109 mmol/L    Carbon Dioxide 28 20 - 32 mmol/L    Anion Gap 5 3 - 14 mmol/L    Glucose 89 70 - 99 mg/dL    Urea  Nitrogen 10 7 - 30 mg/dL    Creatinine 0.53 0.52 - 1.04 mg/dL    GFR Estimate >90 >60 mL/min/1.7m2    GFR Estimate If Black >90 >60 mL/min/1.7m2    Calcium 8.5 8.5 - 10.1 mg/dL   CT Chest/Abdomen/Pelvis w Contrast    Narrative    CT CHEST, ABDOMEN, AND PELVIS WITH CONTRAST  5/8/2018 10:59 AM     HISTORY: Fall. Chest pain.    COMPARISON: None.    TECHNIQUE: Volumetric helical acquisition of CT images from the lung  apices through the symphysis pubis after the administration of 70 mL  Isovue 370  intravenous contrast. Radiation dose for this scan was  reduced using automated exposure control, adjustment of the mA and/or  kV according to patient size, or iterative reconstruction technique.    FINDINGS:   Chest: There is atelectasis in both lung bases, right greater than  left. No evidence of pneumothorax. No evidence of mediastinal injury.  There is a nondisplaced fracture of the right tenth rib posteriorly.  Mildly displaced fractures of the right sixth and seventh ribs  laterally.    Abdomen and pelvis: Postoperative changes of cholecystectomy. The  common bile duct is prominent in size, but this is likely related to  the cholecystectomy. The spleen, liver, pancreas, adrenal glands, and  kidneys show no acute abnormality. There is no free intraperitoneal  air. The bowel is normal in caliber. There is no ascites. The uterus  is absent. There is fusion hardware in the lumbar spine. Implanted  device in the left low back.       Impression    IMPRESSION:   1. Multiple right-sided rib fractures.  2. Small right pleural effusion.  3. No evidence of traumatic organ injury in the chest, abdomen, or  pelvis.    TINO PRO MD   Cervical spine CT w/o contrast    Narrative    CT CERVICAL SPINE WITHOUT CONTRAST   5/8/2018 11:00 AM     HISTORY:  Neck pain, fall off horse.     TECHNIQUE: Axial images of the cervical spine were obtained without  intravenous contrast. Multiplanar reformations were performed.   Radiation dose for  this scan was reduced using automated exposure  control, adjustment of the mA and/or kV according to patient size, or  iterative reconstruction technique.    COMPARISON: None.    FINDINGS: No evidence of fracture. No prevertebral soft tissue  swelling. There is straightening of the normal cervical lordosis.  Minimal grade 1 anterolisthesis of C4 on C5. Vertebral body height is  maintained. No destructive osseous lesions. Mild degenerative endplate  changes and loss of intervertebral disc height at C5-C6 and C6-C7.  Marked anterior osteophytic spurring at C5-C6 and to a lesser extent  C6-C7. Small posterior disc osteophyte complexes at C5-C6 and C6-C7  without significant spinal canal narrowing. Mild bilateral neural  foraminal narrowing at C5-C6 due to uncinate spurring.    Visualized paraspinous tissues: Unremarkable.      Impression    IMPRESSION:   1. No evidence of acute fracture or subluxation in the cervical spine.  2. Degenerative changes in the cervical spine as described above.      EDY CACERES MD       Medications   HYDROmorphone (PF) (DILAUDID) injection 0.5 mg (0.5 mg Intravenous Given 5/8/18 9751)   sodium chloride (PF) 0.9% PF flush 3 mL (3 mLs Intravenous Given by Other Clinician 5/8/18 1041)   Saline Bag 100mL  CT  flush use only (60 mLs Intravenous Given by Other Clinician 5/8/18 1602)   iopamidol (ISOVUE-370) solution 500 mL (70 mLs Intravenous Given by Other Clinician 5/8/18 1041)       Assessments & Plan (with Medical Decision Making)  The patient's labs and imaging were reviewed and are significant for a small pleural effusion at the base of the right lung as well as 3 rib fractures which  are all consistent with the significant pain the patient has on exam and with deep breathing.  There is also a band of atelectasis in the lower portion of the lungs bilaterally. She was treated with IV dilaudid in the ED for her pain and did well with this. Given the mechanism of her fall, her distracting  injury, and her report of hitting her head, a head and neck CT were also obtained. Head and neck CTs were negative for acute injury. Her chest and abdominal CTs were also negative for any injury to her internal organs. The patient's injuries were discussed with the patient and her . It was explained that rib fractures typically take 4-6 weeks to heal. She was offered a serratus anterior plane nerve block for pain but decided against this. She was also offered inpatient admission for further evaluation of her pain management but chose to go home with pain medications instead. She was prescribed percocet for her pain and was advised to rest and avoid strenuous activities such as horseback riding.      I have reviewed the nursing notes.    I have reviewed the findings, diagnosis, plan and need for follow up with the patient.       Discharge Medication List as of 5/8/2018 12:39 PM      START taking these medications    Details   oxyCODONE-acetaminophen (PERCOCET) 5-325 MG per tablet Take 1-2 tablets by mouth every 4 hours as needed for pain, Disp-20 tablet, R-0, Local Print             Final diagnoses:   Closed fracture of multiple ribs of right side, initial encounter     This document serves as a record of the services and decisions personally performed by Brenton Epps MD. It was created on his behalf by Hilario Barnett, a trained medical student. The creation of this record is based on the provider's observations and the statements of the patient.      Hilario Barnett, MS4  May 8, 2018        5/8/2018   [facility] EMERGENCY DEPARTMENT     Brenton Epps MD  05/08/18 8329

## 2018-05-08 NOTE — TELEPHONE ENCOUNTER
Elisa Al is a 52 year old female who calls with shortness of breath and pain to the right side after a fall from a horse on Rogelio morning.  Patient reports that she landed hard on the right side.  She reports that she had the wind knocked out of her but doesn't feel she hit her head.  She reports that she is very bruised and swollen.  She has pain with movement and needs to walk slowly to get around.  Patient reports shortness of breath that is getting worse and reports that she can't take a deep breath.  She reports that she feels her right lung hurts when she breaths.  She sounded winded on the phone.  She is not sure if she broke anything or not.      NURSING ASSESSMENT:  Description:  Shortness of breath, pain.   Onset/duration:  Rogelio morning.   Precip. factors:  Fall from horse.   Associated symptoms:  Pain, decrease mobility.   Improves/worsens symptoms:  Worsening.   Pain scale (0-10)   9/10  Last exam/Treatment:  02/21/2018  Allergies:   Allergies   Allergen Reactions     Aspirin Swelling     Erythromycin Ethylsuccinate GI Disturbance     gi upset     Ibuprofen Swelling     Naproxen      Zelnorm [Tegaserod Maleate]      NURSING PLAN: Nursing advice to patient .    RECOMMENDED DISPOSITION:  Due to symptoms, worsening shortness of breath, and increasing pain, patient will be seen in the ED.    Will comply with recommendation: Yes  If further questions/concerns or if symptoms do not improve, worsen or new symptoms develop, call your PCP or [facility] as soon as possible.    Guideline used:  Pain  Telephone Triage Protocols for Nurses, Fifth Edition, Toña Sullivan RN

## 2018-05-08 NOTE — ED AVS SNAPSHOT
[facility] Emergency Department    [address] [place] [address]    Phone:  [phone]    Fax:  [phone]                                       Elisa Al   MRN: [id number]    Department:  [facility] Emergency Department   Date of Visit:  5/8/2018           After Visit Summary Signature Page     I have received my discharge instructions, and my questions have been answered. I have discussed any challenges I see with this plan with the nurse or doctor.    ..........................................................................................................................................  Patient/Patient Representative Signature      ..........................................................................................................................................  Patient Representative Print Name and Relationship to Patient    ..................................................               ................................................  Date                                            Time    ..........................................................................................................................................  Reviewed by Signature/Title    ...................................................              ..............................................  Date                                                            Time

## 2018-05-08 NOTE — ED TRIAGE NOTES
"Riding horse bareback on Sunday morning and horse \"spooked\" and spun and patient fell off onto her right side.    "

## 2018-05-08 NOTE — TELEPHONE ENCOUNTER
Reason for call:  Patient reporting a symptom    Symptom or request: Patient was on her horse on Sunday and the horse got spooked and threw her off. She hit very hard on right side of her whole body. It knocked the wind out of her. She is having a lot of pain that is getting worse instead of better. She can't take a deep breath.    Duration (how long have symptoms been present): 2 days    Have you been treated for this before? No    Additional comments:     Phone Number patient can be reached at:  Home number on file [phone] (home)    Best Time:  any    Can we leave a detailed message on this number:  YES    Call taken on 5/8/2018 at 7:44 AM by Malia Jacobs

## 2018-05-08 NOTE — ED AVS SNAPSHOT
[facility] Emergency Department    [address]     [place] [address]    Phone:  [phone]    Fax:  [phone]                                       Elisa Al   MRN: [id number]    Department:  [facility] Emergency Department   Date of Visit:  5/8/2018           Patient Information     Date Of Birth          1965        Your diagnoses for this visit were:     Closed fracture of multiple ribs of right side, initial encounter        You were seen by Brenton Epps MD.      Follow-up Information     Follow up with Fanny Lisa APRN CNP.    Specialty:  Nurse Practitioner - Family    Contact information:    [address] [place] [address]  [phone]          Discharge Instructions         Rib Fracture    You broke one or more ribs. This is called a rib fracture. Rib fractures do not require a cast like other bones. They will heal by themselves in about 4 to 6 weeks. The first 3 to 4 weeks will be the most painful because deep breathing, coughing, or changing position from sitting to lying down, may cause the broken ends to move slightly.  Home care    Rest. You should not be doing any heavy lifting or strenuous exertion until the pain goes away.    It hurts to breathe when you have a broken rib. This puts you at risk of getting pneumonia from poor airflow through your lungs. To prevent this:  ? Take several very deep breaths once an hour while you're awake. Exhale through pursed lips as if you are blowing up a balloon. If possible, actually blow up a balloon or a rubber glove. This exercise builds up pressure inside the lung and prevents collapse of the small air sacs of the lung. This exercise may cause some pain at the site of injury, which is normal.  ? You may have gotten a breathing exercise device called an incentive spirometer. Use it at least 4 times a day, or as directed.    Apply an ice pack over the injured area for 15 to 20 minutes every 1 to  2 hours. You should do this for the first 24 to 48 hours. You can make an ice pack by filling a plastic bag that seals at the top with ice cubes and then wrapping it with a thin towel. Continue with ice packs as needed for the relief of pain and swelling.    You may use over-the-counter pain medicine to control pain, unless another pain medicine was prescribed. If you have chronic liver or kidney disease or ever had a stomach ulcer or GI bleeding, talk with your healthcare provider before using these medicines.    If your pain is not controlled, contact your healthcare provider. Sometimes a stronger pain medicine may be needed. A nerve block can be done in case of severe pain. It will numb the nerve between the ribs.  Follow-up care  Follow up with your healthcare provider, or as advised. Rarely, a broken rib will cause complications within the first few days that may not be evident during your initial exam. This can include collapsed lung, bleeding around the lung or into the abdomen, or pneumonia. Therefore, watch for the signs below.  If X-rays were taken, you will be told of any new findings that may affect your care.  Call 911  Call 911 if you have:    Dizziness, weakness or fainting    Shortness of breath with or without chest discomfort    New or worsening abdominal pain    Discomfort in other areas of your upper body such as your shoulders, jaw, neck, or arms  When to seek medical advice  Call your healthcare provider right away if any of these occur:    Increasing chest pain with breathing    Fever of 100.4 F (38 C) or higher, or as directed by your healthcare provider    Congested cough  Date Last Reviewed: 12/3/2015    5395-0197 [employer]. [address] [place] [address], [place] [address]. All rights reserved. This information is not intended as a substitute for professional medical care. Always follow your healthcare professional's instructions.          Your next 10 appointments already  scheduled     May 16, 2018  9:00 AM CDT   Ortho Treatment with Dominique Ashford PT   [facility] ([facility])    [address] [place] [id number] [phone]              24 Hour Appointment Hotline       To make an appointment at any [facility] clinic, call [phone] ([phone]). If you don't have a family doctor or clinic, we will help you find one. [facility] clinics are conveniently located to serve the needs of you and your family.             Review of your medicines      START taking        Dose / Directions Last dose taken    oxyCODONE-acetaminophen 5-325 MG per tablet   Commonly known as:  PERCOCET   Dose:  1-2 tablet   Quantity:  20 tablet        Take 1-2 tablets by mouth every 4 hours as needed for pain   Refills:  0          Our records show that you are taking the medicines listed below. If these are incorrect, please call your family doctor or clinic.        Dose / Directions Last dose taken    levothyroxine 100 MCG tablet   Commonly known as:  SYNTHROID/LEVOTHROID   Dose:  100 mcg        Take 100 mcg by mouth   Refills:  0        * sertraline 100 MG tablet   Commonly known as:  ZOLOFT        Refills:  0        * sertraline 50 MG tablet   Commonly known as:  ZOLOFT        Refills:  0        traMADol 50 MG tablet   Commonly known as:  ULTRAM   Dose:   mg   Quantity:  40 tablet        Take 1-2 tablets ( mg) by mouth every 12 hours as needed for pain maximum 4 tablet(s) per day.   Refills:  0        TYLENOL 500 MG tablet   Quantity:  60   Generic drug:  acetaminophen        2 tablets daily PRN   Refills:  0        * Notice:  This list has 2 medication(s) that are the same as other medications prescribed for you. Read the directions carefully, and ask your doctor or other care provider to review them with you.            Information about OPIOIDS     PRESCRIPTION OPIOIDS: WHAT YOU NEED TO KNOW   You have a prescription for an opioid  (narcotic) pain medicine. Opioids can cause addiction. If you have a history of chemical dependency of any type, you are at a higher risk of becoming addicted to opioids. Only take this medicine after all other options have been tried. Take it for as short a time and as few doses as possible.     Do not:    Drive. If you drive while taking these medicines, you could be arrested for driving under the influence (DUI).    Operate heavy machinery    Do any other dangerous activities while taking these medicines.     Drink any alcohol while taking these medicines.      Take with any other medicines that contain acetaminophen. Read all labels carefully. Look for the word  acetaminophen  or  Tylenol.  Ask your pharmacist if you have questions or are unsure.    Store your pills in a secure place, locked if possible. We will not replace any lost or stolen medicine. If you don t finish your medicine, please throw away (dispose) as directed by your pharmacist. The [place] Pollution Control Agency has more information about safe disposal: https://www.pca.[facility].[place].us/living-green/managing-unwanted-medications    All opioids tend to cause constipation. Drink plenty of water and eat foods that have a lot of fiber, such as fruits, vegetables, prune juice, apple juice and high-fiber cereal. Take a laxative (Miralax, milk of magnesia, Colace, Senna) if you don t move your bowels at least every other day.         Prescriptions were sent or printed at these locations (1 Prescription)                   [facility] [place], [place] - [facility]    [address] [facility] [address]    Telephone:  [phone]   Fax:  [phone]   Hours:                  Printed at Department/Unit printer (1 of 1)         oxyCODONE-acetaminophen (PERCOCET) 5-325 MG per tablet                Procedures and tests performed during your visit     Basic metabolic panel    CBC with platelets differential    CT Chest/Abdomen/Pelvis w  "Contrast    Cervical spine CT w/o contrast    POC US ABDOMEN LIMITED    Peripheral IV catheter      Orders Needing Specimen Collection     None      Pending Results     Date and Time Order Name Status Description    2018 0950 POC US ABDOMEN LIMITED In process             Pending Culture Results     No orders found from 2018 to 2018.            Pending Results Instructions     If you had any lab results that were not finalized at the time of your Discharge, you can call the ED Lab Result RN at [phone]. You will be contacted by this team for any positive Lab results or changes in treatment. The nurses are available 7 days a week from 10A to 6:30P.  You can leave a message 24 hours per day and they will return your call.        Thank you for choosing [facility]       Thank you for choosing [facility] for your care. Our goal is always to provide you with excellent care. Hearing back from our patients is one way we can continue to improve our services. Please take a few minutes to complete the written survey that you may receive in the mail after you visit with us. Thank you!        [employer] Information     [employer] lets you send messages to your doctor, view your test results, renew your prescriptions, schedule appointments and more. To sign up, go to [email or web address] . Click on \"Log in\" on the left side of the screen, which will take you to the Welcome page. Then click on \"Sign up Now\" on the right side of the page.     You will be asked to enter the access code listed below, as well as some personal information. Please follow the directions to create your username and password.     Your access code is: [email or web address]  Expires: 5/15/2018  5:40 PM     Your access code will  in 90 days. If you need help or a new code, please call your [facility] clinic or [phone].        Care EveryWhere ID     This is your Care EveryWhere ID. This could be used by other organizations to access your [facility] " medical records  [id number]        Equal Access to Services     CIPRIANO JUAN : Hallie Johnston, mariluz daley, candie casas. So [facility] [phone].    ATENCIÓN: Si habla español, tiene a parisi disposición servicios gratuitos de asistencia lingüística. Llame al [phone].    We comply with applicable federal civil rights laws and [place] laws. We do not discriminate on the basis of race, color, national origin, age, disability, sex, sexual orientation, or gender identity.            After Visit Summary       This is your record. Keep this with you and show to your community pharmacist(s) and doctor(s) at your next visit.

## 2018-05-08 NOTE — ED NOTES
Bedside report taken from CK,  Care assumed.  Pt states her pain was better for a little while but now currently having more pain.

## 2018-05-08 NOTE — DISCHARGE INSTRUCTIONS
Rib Fracture    You broke one or more ribs. This is called a rib fracture. Rib fractures do not require a cast like other bones. They will heal by themselves in about 4 to 6 weeks. The first 3 to 4 weeks will be the most painful because deep breathing, coughing, or changing position from sitting to lying down, may cause the broken ends to move slightly.  Home care    Rest. You should not be doing any heavy lifting or strenuous exertion until the pain goes away.    It hurts to breathe when you have a broken rib. This puts you at risk of getting pneumonia from poor airflow through your lungs. To prevent this:  ? Take several very deep breaths once an hour while you're awake. Exhale through pursed lips as if you are blowing up a balloon. If possible, actually blow up a balloon or a rubber glove. This exercise builds up pressure inside the lung and prevents collapse of the small air sacs of the lung. This exercise may cause some pain at the site of injury, which is normal.  ? You may have gotten a breathing exercise device called an incentive spirometer. Use it at least 4 times a day, or as directed.    Apply an ice pack over the injured area for 15 to 20 minutes every 1 to 2 hours. You should do this for the first 24 to 48 hours. You can make an ice pack by filling a plastic bag that seals at the top with ice cubes and then wrapping it with a thin towel. Continue with ice packs as needed for the relief of pain and swelling.    You may use over-the-counter pain medicine to control pain, unless another pain medicine was prescribed. If you have chronic liver or kidney disease or ever had a stomach ulcer or GI bleeding, talk with your healthcare provider before using these medicines.    If your pain is not controlled, contact your healthcare provider. Sometimes a stronger pain medicine may be needed. A nerve block can be done in case of severe pain. It will numb the nerve between the ribs.  Follow-up care  Follow up with  your healthcare provider, or as advised. Rarely, a broken rib will cause complications within the first few days that may not be evident during your initial exam. This can include collapsed lung, bleeding around the lung or into the abdomen, or pneumonia. Therefore, watch for the signs below.  If X-rays were taken, you will be told of any new findings that may affect your care.  Call 911  Call 911 if you have:    Dizziness, weakness or fainting    Shortness of breath with or without chest discomfort    New or worsening abdominal pain    Discomfort in other areas of your upper body such as your shoulders, jaw, neck, or arms  When to seek medical advice  Call your healthcare provider right away if any of these occur:    Increasing chest pain with breathing    Fever of 100.4 F (38 C) or higher, or as directed by your healthcare provider    Congested cough  Date Last Reviewed: 12/3/2015    9395-1205 [employer]. [address] [place] [address], [place] [address]. All rights reserved. This information is not intended as a substitute for professional medical care. Always follow your healthcare professional's instructions.

## 2018-05-09 ENCOUNTER — HOSPITAL ENCOUNTER (EMERGENCY)
Facility: CLINIC | Age: 53
Discharge: HOME | End: 2018-05-09
Attending: EMERGENCY MEDICINE | Admitting: EMERGENCY MEDICINE
Payer: COMMERCIAL

## 2018-05-09 ENCOUNTER — APPOINTMENT (OUTPATIENT)
Dept: GENERAL RADIOLOGY | Facility: CLINIC | Age: 53
End: 2018-05-09
Attending: EMERGENCY MEDICINE
Payer: COMMERCIAL

## 2018-05-09 VITALS
OXYGEN SATURATION: 94 % | RESPIRATION RATE: 20 BRPM | DIASTOLIC BLOOD PRESSURE: 93 MMHG | HEART RATE: 100 BPM | SYSTOLIC BLOOD PRESSURE: 130 MMHG | TEMPERATURE: 98.6 F

## 2018-05-09 DIAGNOSIS — J90 PLEURAL EFFUSION: ICD-10-CM

## 2018-05-09 DIAGNOSIS — S22.41XA CLOSED FRACTURE OF MULTIPLE RIBS OF RIGHT SIDE, INITIAL ENCOUNTER: ICD-10-CM

## 2018-05-09 DIAGNOSIS — J98.19 PULMONARY COLLAPSE: ICD-10-CM

## 2018-05-09 PROCEDURE — 96375 TX/PRO/DX INJ NEW DRUG ADDON: CPT | Performed by: EMERGENCY MEDICINE

## 2018-05-09 PROCEDURE — 96374 THER/PROPH/DIAG INJ IV PUSH: CPT | Performed by: EMERGENCY MEDICINE

## 2018-05-09 PROCEDURE — 99285 EMERGENCY DEPT VISIT HI MDM: CPT | Mod: 25 | Performed by: EMERGENCY MEDICINE

## 2018-05-09 PROCEDURE — 71046 X-RAY EXAM CHEST 2 VIEWS: CPT | Mod: TC

## 2018-05-09 PROCEDURE — 25000132 ZZH RX MED GY IP 250 OP 250 PS 637: Performed by: EMERGENCY MEDICINE

## 2018-05-09 PROCEDURE — 99284 EMERGENCY DEPT VISIT MOD MDM: CPT | Mod: Z6 | Performed by: EMERGENCY MEDICINE

## 2018-05-09 PROCEDURE — 25000128 H RX IP 250 OP 636: Performed by: EMERGENCY MEDICINE

## 2018-05-09 RX ORDER — HYDROMORPHONE HYDROCHLORIDE 1 MG/ML
0.5 INJECTION, SOLUTION INTRAMUSCULAR; INTRAVENOUS; SUBCUTANEOUS
Status: DISCONTINUED | OUTPATIENT
Start: 2018-05-09 | End: 2018-05-09 | Stop reason: HOSPADM

## 2018-05-09 RX ORDER — GABAPENTIN 300 MG/1
300 CAPSULE ORAL 3 TIMES DAILY
Status: DISCONTINUED | OUTPATIENT
Start: 2018-05-09 | End: 2018-05-09

## 2018-05-09 RX ORDER — LORAZEPAM 2 MG/ML
1 INJECTION INTRAMUSCULAR ONCE
Status: COMPLETED | OUTPATIENT
Start: 2018-05-09 | End: 2018-05-09

## 2018-05-09 RX ORDER — GABAPENTIN 300 MG/1
300 CAPSULE ORAL ONCE
Status: COMPLETED | OUTPATIENT
Start: 2018-05-09 | End: 2018-05-09

## 2018-05-09 RX ORDER — CYCLOBENZAPRINE HCL 10 MG
10 TABLET ORAL 3 TIMES DAILY PRN
Qty: 20 TABLET | Refills: 0 | Status: SHIPPED | OUTPATIENT
Start: 2018-05-09 | End: 2018-05-15

## 2018-05-09 RX ORDER — GABAPENTIN 300 MG/1
300 CAPSULE ORAL 3 TIMES DAILY
Qty: 90 CAPSULE | Refills: 0 | Status: SHIPPED | OUTPATIENT
Start: 2018-05-09 | End: 2018-05-14 | Stop reason: SINTOL

## 2018-05-09 RX ADMIN — GABAPENTIN 300 MG: 300 CAPSULE ORAL at 18:00

## 2018-05-09 RX ADMIN — HYDROMORPHONE HYDROCHLORIDE 0.5 MG: 1 INJECTION, SOLUTION INTRAMUSCULAR; INTRAVENOUS; SUBCUTANEOUS at 18:10

## 2018-05-09 RX ADMIN — LORAZEPAM 1 MG: 2 INJECTION INTRAMUSCULAR; INTRAVENOUS at 18:42

## 2018-05-09 NOTE — ED TRIAGE NOTES
Patient here with fcontiuning and increasing pain from multiple rib fractures from when she fell off a horse on Sunday. Had 1 mg Dilaudid in ambulance.

## 2018-05-09 NOTE — ED PROVIDER NOTES
"  History     Chief Complaint   Patient presents with     Rib Problem     The history is provided by the patient and medical records.     Elisa Al is a 52 year old female who presents to the ED via EMS for evaluation of rib pain. Patient was seen in the ED 3 days ago after falling off a horse. She obtained multiple rib fractures and was sent home on Percocet. She is having increasing pain to the right side of her ribs and describes it as a \"stabbing\" sensation. Denies cough. Had 1 mg of Dilaudid en route. She continues to smoke. She is still doing chores in the barn. No abdominal pain. No weakness or numbness in lower extremities.  Denies abdominal pain, nausea or vomiting.    Yesterday she did her incentive spirometry and got up to 1000, but today could only get to 500.       Narrative      CT CHEST, ABDOMEN, AND PELVIS WITH CONTRAST  5/8/2018 10:59 AM      HISTORY: Fall. Chest pain.     COMPARISON: None.     TECHNIQUE: Volumetric helical acquisition of CT images from the lung  apices through the symphysis pubis after the administration of 70 mL  Isovue 370  intravenous contrast. Radiation dose for this scan was  reduced using automated exposure control, adjustment of the mA and/or  kV according to patient size, or iterative reconstruction technique.     FINDINGS:   Chest: There is atelectasis in both lung bases, right greater than  left. No evidence of pneumothorax. No evidence of mediastinal injury.  There is a nondisplaced fracture of the right tenth rib posteriorly.  Mildly displaced fractures of the right sixth and seventh ribs  laterally.     Abdomen and pelvis: Postoperative changes of cholecystectomy. The  common bile duct is prominent in size, but this is likely related to  the cholecystectomy. The spleen, liver, pancreas, adrenal glands, and  kidneys show no acute abnormality. There is no free intraperitoneal  air. The bowel is normal in caliber. There is no ascites. The uterus  is absent. There is " fusion hardware in the lumbar spine. Implanted  device in the left low back.      Impression     IMPRESSION:   1. Multiple right-sided rib fractures.  2. Small right pleural effusion.  3. No evidence of traumatic organ injury in the chest, abdomen, or  pelvis.     TINO PRO MD         Problem List:    Patient Active Problem List    Diagnosis Date Noted     Hypothyroidism, unspecified type 02/21/2018     Priority: Medium     Major depression in complete remission (H) 02/21/2018     Priority: Medium     CARDIOVASCULAR SCREENING; LDL GOAL LESS THAN 160 10/31/2010     Priority: Medium     Pain in joint, shoulder region 12/05/2006     Priority: Medium        Past Medical History:    Past Medical History:   Diagnosis Date     Benign neoplasm of other specified sites of skin 4/6/2006     Degenerative disk disease 12/18/12     Gout, unspecified      Other and unspecified ovarian cyst      Unspecified hypothyroidism years     Unspecified intestinal obstruction 02/02/2005       Past Surgical History:    Past Surgical History:   Procedure Laterality Date     C LAP,ABD/PERIT/OMENTUM,UNLIST  04/12/10    pelvic adhesion     C TOTAL ABDOM HYSTERECTOMY  09/08/2004    TAHRSAMEERA, Cystoscopy.     CARPAL TUNNEL RELEASE RT/LT  2005    right wrist     EXPLORE SPINE, REMOVE HARDWARE, COMBINED  4/7/2014    Procedure: COMBINED EXPLORE SPINE, REMOVE HARDWARE;  Hardware Removal Lumbar 4-Sacral 1, Exploration of Fusion;  Surgeon: Brenton Ashford MD;  Location: [facility] OR     FUSION LUMBAR ANTERIOR THREE+ LEVELS       HC COLONOSCOPY W BIOPSY  04/26/10     HC COLONOSCOPY W/WO BRUSH/WASH  5/25/2005     HC EXCISION TENDON SHEATH LESION, HAND/FINGR  04/17/2006    Right middle finger flexor sheath MP joint.     HC EXCISION TENDON SHEATH LESION, HAND/FINGR  08/07/06    Right middle finger     HC LAPAROSCOPY, SURGICAL; CHOLECYSTECTOMY  4/12/2004    Cholecystectomy, Laparoscopic     HC REMOVAL OF OVARY/TUBE(S)      left ovary removed secondary to  cyst     HC REMOVAL OF TONSILS,12+ Y/O      Tonsils 12+y.o.     HC REVISE MEDIAN N/CARPAL TUNNEL SURG  8/22/2005    Left     HC UGI ENDOSCOPY, SIMPLE EXAM  5/25/2005       Family History:    Family History   Problem Relation Age of Onset     Blood Disease Mother      leukemia-passed 2008     Cardiovascular Father      triple bypass     Respiratory Father      Black Lung     Alzheimer Disease Father      CANCER Maternal Grandmother      ovarian/brain ca       Social History:  Marital Status:   [2]  Social History   Substance Use Topics     Smoking status: Current Some Day Smoker     Packs/day: 0.50     Years: 23.00     Smokeless tobacco: Former User     Quit date: 1/19/2013      Comment: one pack every 1 month-only on weekends     Alcohol use 0.0 oz/week      Comment: nightly        Medications:      cyclobenzaprine (FLEXERIL) 10 MG tablet   gabapentin (NEURONTIN) 300 MG capsule   oxyCODONE-acetaminophen (PERCOCET) 5-325 MG per tablet   levothyroxine (SYNTHROID/LEVOTHROID) 100 MCG tablet   sertraline (ZOLOFT) 100 MG tablet   sertraline (ZOLOFT) 50 MG tablet   traMADol (ULTRAM) 50 MG tablet   TYLENOL EXTRA STRENGTH 500 MG OR TABS         Review of Systems    All other ROS reviewed and are negative or non-contributory except as stated in HPI.    Physical Exam   BP: (!) 125/91  Pulse: 100  Heart Rate: 101  Temp: 98.6  F (37  C)  Resp: 20  SpO2: 95 %      Physical Exam general alert cooperative female in moderate distress.  HEENT is atraumatic.  Neck is supple.  The bony spine is nontender.  She has tenderness over the right lateral ribs without crepitus.  Lungs reveal no adventitious lung sounds.  Cardiac regular without murmur.  Abdomen was benign.    ED Course     ED Course     Procedures           Results for orders placed or performed during the hospital encounter of 05/09/18   XR Chest 2 Views    Narrative    CHEST TWO VIEW   5/9/2018 6:33 PM     HISTORY: Right-sided rib pain, diagnosed with rib fractures.      COMPARISON: CT 5/8/2018.      Impression    IMPRESSION: Previously seen right-sided rib fractures are much better  appreciated by CT. Moderate right pleural effusion with associated  right basilar atelectasis. No significant left pleural effusion. No  pneumothorax visualized. Spinal stimulator wires present.            Critical Care time:  none               Results for orders placed or performed during the hospital encounter of 05/09/18 (from the past 24 hour(s))   XR Chest 2 Views    Narrative    CHEST TWO VIEW   5/9/2018 6:33 PM     HISTORY: Right-sided rib pain, diagnosed with rib fractures.     COMPARISON: CT 5/8/2018.      Impression    IMPRESSION: Previously seen right-sided rib fractures are much better  appreciated by CT. Moderate right pleural effusion with associated  right basilar atelectasis. No significant left pleural effusion. No  pneumothorax visualized. Spinal stimulator wires present.       Medications   HYDROmorphone (PF) (DILAUDID) injection 0.5 mg (0.5 mg Intravenous Given 5/9/18 1810)   gabapentin (NEURONTIN) capsule 300 mg (300 mg Oral Given 5/9/18 1800)   LORazepam (ATIVAN) injection 1 mg (1 mg Intravenous Given 5/9/18 1842)     Patient received IV Dilaudid in route.  She was given additional dose while here.  She is given oral Neurontin.  A repeat x-ray showed a right-sided effusion and atelectasis.   Patient was still quite anxious and was given Ativan with excellent response.  Assessments & Plan (with Medical Decision Making)   Patient is a 52-year-old female presents with persistent right sided rib pains and shortness of breath.  She fell off her horse on Sunday with multiple rib fractures and a pleural effusion.  Prescribed Percocet and patient states initially that seemed to benefit.  She is able to do over 1000 on her incentive spirometer but today pain became markedly worse and she can only do on 500 cc.  She denies any productive cough.  No fever or chills.  No abdominal  complaints, nausea or vomiting.  On presentation she was mildly hypertensive and borderline tachycardic.  She was not febrile or hypoxic.  On auscultation she had crackles at the right base without active wheezing.  Cardiac auscultation was normal.  She had lateral rib tenderness without crepitus or step-off.  No neck or back tenderness including the bony spine.  Abdomen was benign.  Patient was given IV and oral meds as above.  At this point will add Neurontin for her nerve pain.  Flexeril for muscle spasm.  Continue Percocet as needed.  She understands these are all constipating and will do a stool softener to prevent this.  She will continue with her incentive spirometer at least every hour.  Handout on rib fractures provided and reasons to return for reassessment are discussed.  I have reviewed the nursing notes.    I have reviewed the findings, diagnosis, plan and need for follow up with the patient.       New Prescriptions    CYCLOBENZAPRINE (FLEXERIL) 10 MG TABLET    Take 1 tablet (10 mg) by mouth 3 times daily as needed for muscle spasms    GABAPENTIN (NEURONTIN) 300 MG CAPSULE    Take 1 capsule (300 mg) by mouth 3 times daily       Final diagnoses:   Closed fracture of multiple ribs of right side, initial encounter   Pleural effusion   Pulmonary collapse     This document serves as a record of services personally performed by Nicholas Bailey MD. It was created on their behalf by Angella Berry, a trained medical scribe. The creation of this record is based on the provider's personal observations and the statements of the patient. This document has been checked and approved by the attending provider.    Note: Chart documentation done in part with Dragon Voice Recognition software. Although reviewed after completion, some word and grammatical errors may remain.        5/9/2018   [facility] EMERGENCY DEPARTMENT     Nicholas Bailey MD  05/09/18 6182

## 2018-05-09 NOTE — ED AVS SNAPSHOT
[facility] Emergency Department    [address] [place] [address]    Phone:  [phone]    Fax:  [phone]                                       Elisa Al   MRN: [id number]    Department:  [facility] Emergency Department   Date of Visit:  5/9/2018           Patient Information     Date Of Birth          1965        Your diagnoses for this visit were:     Closed fracture of multiple ribs of right side, initial encounter     Pleural effusion     Pulmonary collapse        You were seen by Nicholas Bailey MD.      Follow-up Information     Follow up with Fanny Lisa APRN CNP.    Specialty:  Nurse Practitioner - Family    Why:  As needed    Contact information:    [address] [place] [id number] [phone]          Discharge Instructions         Rib Fracture (Broken Rib)     A chest x-ray may be done.     Your ribs are curved bones in your chest. They help protect your lungs and expand and contract when you breathe. Children's ribs bend easily and can often withstand a blow or fall. But adult ribs are more likely to break (fracture) under stress. Even coughing or a hard sneeze can fracture a rib.  When to go to the Emergency Room (ER)  Although they can be painful, most rib fractures aren't serious. But they often make it hard to cough or breathe deeply. Get medical care right away if you have:    Trouble breathing.    Nausea, vomiting, or stomach pain with a sore or bruised rib.    Pain that worsens over time.    An injury to the chest or stomach.  What to expect in the ER  Here is what will happen in the ER:     A healthcare provider will ask about your injury and examine you carefully.    An X-ray of your chest will likely be taken to show any major damage to ribs and lungs. However, ribs can undergo small breaks that do not show up on X-rays, even though they still hurt.    You may be given medicine to ease your discomfort.    Rarely, rib fractures can cause  a lung to collapse or lead to bleeding in the chest. In these cases, a tube will be inserted into the chest to reinflate the lung or drain the blood.  Follow-up  You are likely to heal in 6 to 8 weeks. Most rib fractures heal on their own with no lasting effects. Call your healthcare provider right away if you notice any of these symptoms:    Increased chest pain    Shortness of breath    Fever    Coughing up blood  Date Last Reviewed: 9/26/2015 2000-2017 [employer]. [address] [place] [address]. All rights reserved. This information is not intended as a substitute for professional medical care. Always follow your healthcare professional's instructions.          Your next 10 appointments already scheduled     May 16, 2018  9:00 AM CDT   Ortho Treatment with Dominique Ashford PT   [facility] ([facility])    [address] [place] [address] Dr [place] [id number] [phone]              24 Hour Appointment Hotline       To make an appointment at any [facility] clinic, call [phone]). If you don't have a family doctor or clinic, we will help you find one. [facility] clinics are conveniently located to serve the needs of you and your family.             Review of your medicines      START taking        Dose / Directions Last dose taken    cyclobenzaprine 10 MG tablet   Commonly known as:  FLEXERIL   Dose:  10 mg   Quantity:  20 tablet        Take 1 tablet (10 mg) by mouth 3 times daily as needed for muscle spasms   Refills:  0        gabapentin 300 MG capsule   Commonly known as:  NEURONTIN   Dose:  300 mg   Quantity:  90 capsule        Take 1 capsule (300 mg) by mouth 3 times daily   Refills:  0          Our records show that you are taking the medicines listed below. If these are incorrect, please call your family doctor or clinic.        Dose / Directions Last dose taken    levothyroxine 100 MCG tablet   Commonly known as:   SYNTHROID/LEVOTHROID   Dose:  100 mcg        Take 100 mcg by mouth   Refills:  0        oxyCODONE-acetaminophen 5-325 MG per tablet   Commonly known as:  PERCOCET   Dose:  1-2 tablet   Quantity:  20 tablet        Take 1-2 tablets by mouth every 4 hours as needed for pain   Refills:  0        * sertraline 100 MG tablet   Commonly known as:  ZOLOFT        Refills:  0        * sertraline 50 MG tablet   Commonly known as:  ZOLOFT        Refills:  0        traMADol 50 MG tablet   Commonly known as:  ULTRAM   Dose:   mg   Quantity:  40 tablet        Take 1-2 tablets ( mg) by mouth every 12 hours as needed for pain maximum 4 tablet(s) per day.   Refills:  0        TYLENOL 500 MG tablet   Quantity:  60   Generic drug:  acetaminophen        2 tablets daily PRN   Refills:  0        * Notice:  This list has 2 medication(s) that are the same as other medications prescribed for you. Read the directions carefully, and ask your doctor or other care provider to review them with you.            Prescriptions were sent or printed at these locations (2 Prescriptions)                   Other Prescriptions                Printed at Department/Unit printer (2 of 2)         cyclobenzaprine (FLEXERIL) 10 MG tablet               gabapentin (NEURONTIN) 300 MG capsule                Procedures and tests performed during your visit     XR Chest 2 Views      Orders Needing Specimen Collection     None      Pending Results     Date and Time Order Name Status Description    5/8/2018 0950 POC US ABDOMEN LIMITED In process             Pending Culture Results     No orders found from 5/7/2018 to 5/10/2018.            Pending Results Instructions     If you had any lab results that were not finalized at the time of your Discharge, you can call the ED Lab Result RN at [phone]. You will be contacted by this team for any positive Lab results or changes in treatment. The nurses are available 7 days a week from 10A to 6:30P.  You can  "leave a message 24 hours per day and they will return your call.        Thank you for choosing [facility]       Thank you for choosing [facility] for your care. Our goal is always to provide you with excellent care. Hearing back from our patients is one way we can continue to improve our services. Please take a few minutes to complete the written survey that you may receive in the mail after you visit with us. Thank you!        [employer] Information     [employer] lets you send messages to your doctor, view your test results, renew your prescriptions, schedule appointments and more. To sign up, go to [email or web address] . Click on \"Log in\" on the left side of the screen, which will take you to the Welcome page. Then click on \"Sign up Now\" on the right side of the page.     You will be asked to enter the access code listed below, as well as some personal information. Please follow the directions to create your username and password.     Your access code is: [email or web address]  Expires: 5/15/2018  5:40 PM     Your access code will  in 90 days. If you need help or a new code, please call your [facility] clinic or [phone].        Care EveryWhere ID     This is your Care EveryWhere ID. This could be used by other organizations to access your [facility] medical records  [id number]        Equal Access to Services     CIPRIANO UJAN AH: Hallie tejada Sobarbara, waaxda luqadaha, qaybta kaalmada corby, candie alcantara. So [facility] [phone].    ATENCIÓN: Si habla español, tiene a parisi disposición servicios gratuitos de asistencia lingüística. Llame al [phone].    We comply with applicable federal civil rights laws and [place] laws. We do not discriminate on the basis of race, color, national origin, age, disability, sex, sexual orientation, or gender identity.            After Visit Summary       This is your record. Keep this with you and show to your community pharmacist(s) and doctor(s) at " your next visit.

## 2018-05-09 NOTE — ED AVS SNAPSHOT
[facility] Emergency Department    [address] [place] [address]    Phone:  [phone]    Fax:  [phone]                                       Elisa Al   MRN: [id number]    Department:  [facility] Emergency Department   Date of Visit:  5/9/2018           After Visit Summary Signature Page     I have received my discharge instructions, and my questions have been answered. I have discussed any challenges I see with this plan with the nurse or doctor.    ..........................................................................................................................................  Patient/Patient Representative Signature      ..........................................................................................................................................  Patient Representative Print Name and Relationship to Patient    ..................................................               ................................................  Date                                            Time    ..........................................................................................................................................  Reviewed by Signature/Title    ...................................................              ..............................................  Date                                                            Time

## 2018-05-09 NOTE — ED NOTES
Patient was seen yesterday for the rib pain. Patient fell off her horse on Sunday. Patient feels the pain medication isn't helping enough. Patient reports she still has been doing chores and smoking. Discussed stopping smoking and getting some help in the barn.

## 2018-05-10 NOTE — DISCHARGE INSTRUCTIONS
Rib Fracture (Broken Rib)     A chest x-ray may be done.     Your ribs are curved bones in your chest. They help protect your lungs and expand and contract when you breathe. Children's ribs bend easily and can often withstand a blow or fall. But adult ribs are more likely to break (fracture) under stress. Even coughing or a hard sneeze can fracture a rib.  When to go to the Emergency Room (ER)  Although they can be painful, most rib fractures aren't serious. But they often make it hard to cough or breathe deeply. Get medical care right away if you have:    Trouble breathing.    Nausea, vomiting, or stomach pain with a sore or bruised rib.    Pain that worsens over time.    An injury to the chest or stomach.  What to expect in the ER  Here is what will happen in the ER:     A healthcare provider will ask about your injury and examine you carefully.    An X-ray of your chest will likely be taken to show any major damage to ribs and lungs. However, ribs can undergo small breaks that do not show up on X-rays, even though they still hurt.    You may be given medicine to ease your discomfort.    Rarely, rib fractures can cause a lung to collapse or lead to bleeding in the chest. In these cases, a tube will be inserted into the chest to reinflate the lung or drain the blood.  Follow-up  You are likely to heal in 6 to 8 weeks. Most rib fractures heal on their own with no lasting effects. Call your healthcare provider right away if you notice any of these symptoms:    Increased chest pain    Shortness of breath    Fever    Coughing up blood  Date Last Reviewed: 9/26/2015 2000-2017 [employer]. [address] [place] [address], [place] [address]. All rights reserved. This information is not intended as a substitute for professional medical care. Always follow your healthcare professional's instructions.

## 2018-05-10 NOTE — ED NOTES
Bedside report from Maritza JACOBO RN and care assumed. Patient is drowsy and pain is tolerable at this time.

## 2018-05-10 NOTE — ED NOTES
Discharge discussed with patient and her SO. Pain is 2/10 and comfortably manageable at discharge.

## 2018-05-14 ENCOUNTER — OFFICE VISIT (OUTPATIENT)
Dept: FAMILY MEDICINE | Facility: OTHER | Age: 53
End: 2018-05-14
Payer: COMMERCIAL

## 2018-05-14 VITALS
SYSTOLIC BLOOD PRESSURE: 128 MMHG | RESPIRATION RATE: 16 BRPM | BODY MASS INDEX: 26.4 KG/M2 | DIASTOLIC BLOOD PRESSURE: 84 MMHG | HEART RATE: 116 BPM | TEMPERATURE: 98.5 F | OXYGEN SATURATION: 99 % | WEIGHT: 139.7 LBS

## 2018-05-14 DIAGNOSIS — E03.9 HYPOTHYROIDISM, UNSPECIFIED TYPE: ICD-10-CM

## 2018-05-14 DIAGNOSIS — F32.5 MAJOR DEPRESSION IN COMPLETE REMISSION (H): Primary | ICD-10-CM

## 2018-05-14 DIAGNOSIS — S22.41XD CLOSED FRACTURE OF MULTIPLE RIBS OF RIGHT SIDE WITH ROUTINE HEALING, SUBSEQUENT ENCOUNTER: Primary | ICD-10-CM

## 2018-05-14 PROCEDURE — 99214 OFFICE O/P EST MOD 30 MIN: CPT | Performed by: PHYSICIAN ASSISTANT

## 2018-05-14 RX ORDER — TRAMADOL HYDROCHLORIDE 50 MG/1
50-100 TABLET ORAL EVERY 12 HOURS PRN
Qty: 40 TABLET | Refills: 0 | Status: SHIPPED | OUTPATIENT
Start: 2018-05-14 | End: 2018-05-22

## 2018-05-14 ASSESSMENT — PAIN SCALES - GENERAL: PAINLEVEL: SEVERE PAIN (7)

## 2018-05-14 NOTE — PATIENT INSTRUCTIONS
Rib Fracture    You broke one or more ribs. This is called a rib fracture. Rib fractures do not require a cast like other bones. They will heal by themselves in about 4 to 6 weeks. The first 3 to 4 weeks will be the most painful because deep breathing, coughing, or changing position from sitting to lying down, may cause the broken ends to move slightly.  Home care    Rest. You should not be doing any heavy lifting or strenuous exertion until the pain goes away.    It hurts to breathe when you have a broken rib. This puts you at risk of getting pneumonia from poor airflow through your lungs. To prevent this:  ? Take several very deep breaths once an hour while you're awake. Exhale through pursed lips as if you are blowing up a balloon. If possible, actually blow up a balloon or a rubber glove. This exercise builds up pressure inside the lung and prevents collapse of the small air sacs of the lung. This exercise may cause some pain at the site of injury, which is normal.  ? You may have gotten a breathing exercise device called an incentive spirometer. Use it at least 4 times a day, or as directed.    Apply an ice pack over the injured area for 15 to 20 minutes every 1 to 2 hours. You should do this for the first 24 to 48 hours. You can make an ice pack by filling a plastic bag that seals at the top with ice cubes and then wrapping it with a thin towel. Continue with ice packs as needed for the relief of pain and swelling.    You may use over-the-counter pain medicine to control pain, unless another pain medicine was prescribed. If you have chronic liver or kidney disease or ever had a stomach ulcer or GI bleeding, talk with your healthcare provider before using these medicines.    If your pain is not controlled, contact your healthcare provider. Sometimes a stronger pain medicine may be needed. A nerve block can be done in case of severe pain. It will numb the nerve between the ribs.  Follow-up care  Follow up with  your healthcare provider, or as advised. Rarely, a broken rib will cause complications within the first few days that may not be evident during your initial exam. This can include collapsed lung, bleeding around the lung or into the abdomen, or pneumonia. Therefore, watch for the signs below.  If X-rays were taken, you will be told of any new findings that may affect your care.  Call 911  Call 911 if you have:    Dizziness, weakness or fainting    Shortness of breath with or without chest discomfort    New or worsening abdominal pain    Discomfort in other areas of your upper body such as your shoulders, jaw, neck, or arms  When to seek medical advice  Call your healthcare provider right away if any of these occur:    Increasing chest pain with breathing    Fever of 100.4 F (38 C) or higher, or as directed by your healthcare provider    Congested cough  Date Last Reviewed: 12/3/2015    5785-0223 [employer]. [address] [place] [address], [place] [address]. All rights reserved. This information is not intended as a substitute for professional medical care. Always follow your healthcare professional's instructions.

## 2018-05-14 NOTE — MR AVS SNAPSHOT
After Visit Summary   5/14/2018    Elisa Al    MRN: [id number]           Patient Information     Date Of Birth          1965        Visit Information        Provider Department      5/14/2018 12:40 PM Cinthya Morgan PA-C [facility]        Today's Diagnoses     Closed fracture of multiple ribs of right side with routine healing, subsequent encounter    -  1      Care Instructions      Rib Fracture    You broke one or more ribs. This is called a rib fracture. Rib fractures do not require a cast like other bones. They will heal by themselves in about 4 to 6 weeks. The first 3 to 4 weeks will be the most painful because deep breathing, coughing, or changing position from sitting to lying down, may cause the broken ends to move slightly.  Home care    Rest. You should not be doing any heavy lifting or strenuous exertion until the pain goes away.    It hurts to breathe when you have a broken rib. This puts you at risk of getting pneumonia from poor airflow through your lungs. To prevent this:  ? Take several very deep breaths once an hour while you're awake. Exhale through pursed lips as if you are blowing up a balloon. If possible, actually blow up a balloon or a rubber glove. This exercise builds up pressure inside the lung and prevents collapse of the small air sacs of the lung. This exercise may cause some pain at the site of injury, which is normal.  ? You may have gotten a breathing exercise device called an incentive spirometer. Use it at least 4 times a day, or as directed.    Apply an ice pack over the injured area for 15 to 20 minutes every 1 to 2 hours. You should do this for the first 24 to 48 hours. You can make an ice pack by filling a plastic bag that seals at the top with ice cubes and then wrapping it with a thin towel. Continue with ice packs as needed for the relief of pain and swelling.    You may use over-the-counter pain medicine to control pain, unless  another pain medicine was prescribed. If you have chronic liver or kidney disease or ever had a stomach ulcer or GI bleeding, talk with your healthcare provider before using these medicines.    If your pain is not controlled, contact your healthcare provider. Sometimes a stronger pain medicine may be needed. A nerve block can be done in case of severe pain. It will numb the nerve between the ribs.  Follow-up care  Follow up with your healthcare provider, or as advised. Rarely, a broken rib will cause complications within the first few days that may not be evident during your initial exam. This can include collapsed lung, bleeding around the lung or into the abdomen, or pneumonia. Therefore, watch for the signs below.  If X-rays were taken, you will be told of any new findings that may affect your care.  Call 911  Call 911 if you have:    Dizziness, weakness or fainting    Shortness of breath with or without chest discomfort    New or worsening abdominal pain    Discomfort in other areas of your upper body such as your shoulders, jaw, neck, or arms  When to seek medical advice  Call your healthcare provider right away if any of these occur:    Increasing chest pain with breathing    Fever of 100.4 F (38 C) or higher, or as directed by your healthcare provider    Congested cough  Date Last Reviewed: 12/3/2015    9292-2487 [employer] [address] [place] [address], [place] [address]. All rights reserved. This information is not intended as a substitute for professional medical care. Always follow your healthcare professional's instructions.                Follow-ups after your visit        Follow-up notes from your care team     Return if symptoms worsen or fail to improve.      Your next 10 appointments already scheduled     May 16, 2018  9:00 AM CDT   Ortho Treatment with Dominique Ashford PT   [facility] ([facility])    [address] [place] [address] Dr Yvonne GARCIA [address]  "  [phone]              Who to contact     If you have questions or need follow up information about today's clinic visit or your schedule please contact [facility] directly at [phone].  Normal or non-critical lab and imaging results will be communicated to you by MyChart, letter or phone within 4 business days after the clinic has received the results. If you do not hear from us within 7 days, please contact the clinic through MyChart or phone. If you have a critical or abnormal lab result, we will notify you by phone as soon as possible.  Submit refill requests through [employer] or call your pharmacy and they will forward the refill request to us. Please allow 3 business days for your refill to be completed.          Additional Information About Your Visit        [facility] Information     [facility]Link lets you send messages to your doctor, view your test results, renew your prescriptions, schedule appointments and more. To sign up, go to [email or web address] . Click on \"Log in\" on the left side of the screen, which will take you to the Welcome page. Then click on \"Sign up Now\" on the right side of the page.     You will be asked to enter the access code listed below, as well as some personal information. Please follow the directions to create your username and password.     Your access code is: [email or web address]  Expires: 5/15/2018  5:40 PM     Your access code will  in 90 days. If you need help or a new code, please call your [facility] or [phone].        Care EveryWhere ID     This is your Care EveryWhere ID. This could be used by other organizations to access your [place] medical records  [id number]        Your Vitals Were     Pulse Temperature Respirations Last Period Pulse Oximetry BMI (Body Mass Index)    116 98.5  F (36.9  C) (Tympanic) 16 2004 99% 26.4 kg/m2       Blood Pressure from Last 3 Encounters:   18 128/84   18 (!) 130/93   18 143/79    Weight " from Last 3 Encounters:   05/14/18 139 lb 11.2 oz (63.4 kg)   05/08/18 143 lb 11.8 oz (65.2 kg)   02/21/18 150 lb (68 kg)              Today, you had the following     No orders found for display         Today's Medication Changes          These changes are accurate as of 5/14/18 12:54 PM.  If you have any questions, ask your nurse or doctor.               These medicines have changed or have updated prescriptions.        Dose/Directions    traMADol 50 MG tablet   Commonly known as:  ULTRAM   This may have changed:  reasons to take this   Used for:  Closed fracture of multiple ribs of right side with routine healing, subsequent encounter   Changed by:  Cinthya Morgan PA-C        Dose:   mg   Take 1-2 tablets ( mg) by mouth every 12 hours as needed for breakthrough pain maximum 4 tablet(s) per day.   Quantity:  40 tablet   Refills:  0         Stop taking these medicines if you haven't already. Please contact your care team if you have questions.     gabapentin 300 MG capsule   Commonly known as:  NEURONTIN   Stopped by:  Cinthya Morgan PA-C                Where to get your medicines      Some of these will need a paper prescription and others can be bought over the counter.  Ask your nurse if you have questions.     Bring a paper prescription for each of these medications     traMADol 50 MG tablet               Information about OPIOIDS     PRESCRIPTION OPIOIDS: WHAT YOU NEED TO KNOW   You have a prescription for an opioid (narcotic) pain medicine. Opioids can cause addiction. If you have a history of chemical dependency of any type, you are at a higher risk of becoming addicted to opioids. Only take this medicine after all other options have been tried. Take it for as short a time and as few doses as possible.     Do not:    Drive. If you drive while taking these medicines, you could be arrested for driving under the influence (DUI).    Operate heavy machinery    Do any other dangerous  activities while taking these medicines.     Drink any alcohol while taking these medicines.      Take with any other medicines that contain acetaminophen. Read all labels carefully. Look for the word  acetaminophen  or  Tylenol.  Ask your pharmacist if you have questions or are unsure.    Store your pills in a secure place, locked if possible. We will not replace any lost or stolen medicine. If you don t finish your medicine, please throw away (dispose) as directed by your pharmacist. The [place] Pollution Control Agency has more information about safe disposal: https://www.pca.[facility].mn.us/living-green/managing-unwanted-medications    All opioids tend to cause constipation. Drink plenty of water and eat foods that have a lot of fiber, such as fruits, vegetables, prune juice, apple juice and high-fiber cereal. Take a laxative (Miralax, milk of magnesia, Colace, Senna) if you don t move your bowels at least every other day.          Primary Care Provider Office Phone # Fax #    FLORENCIA Bonds [facility] [phone]       [address] [place] [address]        Equal Access to Services     [facility]: Hadii wayne ku hadasho Sobarbara, waaxda luqadaha, qaybta kaalmada adeleslie, candie castellanos . So [facility] [phone].    ATENCIÓN: Si habla español, tiene a parisi disposición servicios gratuitos de asistencia lingüística. [place] al [phone].    We comply with applicable federal civil rights laws and [place] laws. We do not discriminate on the basis of race, color, national origin, age, disability, sex, sexual orientation, or gender identity.            Thank you!     Thank you for choosing [facility]  for your care. Our goal is always to provide you with excellent care. Hearing back from our patients is one way we can continue to improve our services. Please take a few minutes to complete the written survey that you may receive in the mail after your visit  with us. Thank you!             Your Updated Medication List - Protect others around you: Learn how to safely use, store and throw away your medicines at www.disposemymeds.org.          This list is accurate as of 5/14/18 12:54 PM.  Always use your most recent med list.                   Brand Name Dispense Instructions for use Diagnosis    cyclobenzaprine 10 MG tablet    FLEXERIL    20 tablet    Take 1 tablet (10 mg) by mouth 3 times daily as needed for muscle spasms        levothyroxine 100 MCG tablet    SYNTHROID/LEVOTHROID     Take 100 mcg by mouth        oxyCODONE-acetaminophen 5-325 MG per tablet    PERCOCET    20 tablet    Take 1-2 tablets by mouth every 4 hours as needed for pain        * sertraline 100 MG tablet    ZOLOFT          * sertraline 50 MG tablet    ZOLOFT          traMADol 50 MG tablet    ULTRAM    40 tablet    Take 1-2 tablets ( mg) by mouth every 12 hours as needed for breakthrough pain maximum 4 tablet(s) per day.    Closed fracture of multiple ribs of right side with routine healing, subsequent encounter       TYLENOL 500 MG tablet   Generic drug:  acetaminophen     60    2 tablets daily PRN        * Notice:  This list has 2 medication(s) that are the same as other medications prescribed for you. Read the directions carefully, and ask your doctor or other care provider to review them with you.

## 2018-05-14 NOTE — TELEPHONE ENCOUNTER
Levothyroxine and Sertaline 100 MG     Last Written Prescription Date:  2/15/18  Last Fill Quantity: ,   # refills:   Last Office Visit: 2/21/18  Future Office visit:    Next 5 appointments (look out 90 days)     May 14, 2018 12:40 PM CDT   [facility]    [address] [facility] [id number] [phone]                   Routing refill request to provider for review/approval because:  Medication is reported/historical

## 2018-05-14 NOTE — PROGRESS NOTES
SUBJECTIVE:   Elisa Al is a 52 year old female who presents to clinic today for the following health issues:      ED/UC Followup:    Facility:  [facility]  Date of visit: 5/8/18 & 5/9/2018  Reason for visit: Broken ribs # 6,7,10, partial collapsed lung right side- fall from horse   Current Status: Breathing has improved, still having rib pain     Patient is here for follow up from recent ER visits. She was thrown from a horse and sustained multiple right sided rib fractures. She has continued using her incentive spirometer and is breathing much better and able to take full breaths at this time. She is however still having significant rib pain. She had been on percocet and then had gabapentin added for nerve pain but she reports that this medication makes her nauseated so she stopped taking it. She has also not been using the flexeril as the muscle spasms have subsided. She is requesting something stronger for pain as the extra strength tylenol she has been taking has not been helping.     -------------------------------------    Problem list and histories reviewed & adjusted, as indicated.  Additional history: as documented    BP Readings from Last 3 Encounters:   05/14/18 128/84   05/09/18 (!) 130/93   05/08/18 143/79    Wt Readings from Last 3 Encounters:   05/14/18 139 lb 11.2 oz (63.4 kg)   05/08/18 143 lb 11.8 oz (65.2 kg)   02/21/18 150 lb (68 kg)         Reviewed and updated as needed this visit by clinical staff  Tobacco  Allergies  Meds  Med Hx  Surg Hx  Fam Hx  Soc Hx      Reviewed and updated as needed this visit by Provider  Allergies  Meds         ROS:  Constitutional, HEENT, cardiovascular, pulmonary, gi and gu systems are negative, except as otherwise noted.    OBJECTIVE:     /84 (BP Location: Left arm, Patient Position: Chair, Cuff Size: Adult Large)  Pulse 116  Temp 98.5  F (36.9  C) (Tympanic)  Resp 16  Wt 139 lb 11.2 oz (63.4 kg)  LMP 01/13/2004  SpO2 99%  BMI  26.4 kg/m2  Body mass index is 26.4 kg/(m^2).  GENERAL: healthy, alert and no distress  RESP: lungs clear to auscultation - no rales, rhonchi or wheezes  CV: regular rate and rhythm, normal S1 S2, no S3 or S4, no murmur, click or rub, no peripheral edema and peripheral pulses strong  MS: chest wall pain on the right   SKIN: no suspicious lesions or rashes    Diagnostic Test Results:  none     ASSESSMENT/PLAN:       ICD-10-CM    1. Closed fracture of multiple ribs of right side with routine healing, subsequent encounter S22.41XD traMADol (ULTRAM) 50 MG tablet       I will switch pain control from percocet to tramadol as patient has done well on this in the past. She should follow up with her PCP to discuss further pain control as needed.   See Patient Instructions    Cinthya Morgan PA-C  [facility]

## 2018-05-15 RX ORDER — SERTRALINE HYDROCHLORIDE 100 MG/1
100 TABLET, FILM COATED ORAL DAILY
Qty: 90 TABLET | Refills: 1 | Status: SHIPPED | OUTPATIENT
Start: 2018-05-15 | End: 2018-11-07

## 2018-05-15 RX ORDER — LEVOTHYROXINE SODIUM 100 UG/1
100 TABLET ORAL DAILY
Qty: 90 TABLET | Refills: 1 | Status: SHIPPED | OUTPATIENT
Start: 2018-05-15 | End: 2018-11-07

## 2018-05-15 NOTE — PROGRESS NOTES
Outpatient Physical Therapy Discharge Note     Patient: Elisa Al  : 1965    Beginning/End Dates of Reporting Period:  3/28/2018 to 5/15/2018    Referring Provider: Dr. Di Zavala    Therapy Diagnosis: B shoulder pain with cervicogenic components with slight capsular tightness noted into ER     Client Self Report: Pt states that overall she has been doing better. She states that they had to clean out their barn and was very sore the next day. She states that she is still getting pain in her L shoulder in the morning and following her barn work, but is able to get down to no pain with use of exericses.     Objective Measurements:  Objective Measure: Pain  Details: Best: 0/10, Worst: 10/10 (4-5/10 on average), Currently: 1/10    ROM measures on 2018:  Shoulder AROM painfree:   Flexion: R=125, L=142; Abduction: R=105 , L=130 ; ER at 90 degs: R=88 , L=76    Cervical ROM:   Flexion: 27, Extension: 69 RSB: 52 LSB: 57 Rrot: 78 Lrot: 75 degs    Goals:  Goal Identifier 1   Goal Description Pt will be able to lift 10#'s overhead with unlateral UE B x 5 consecutive reps without pain with good mechanics for improved safety and ability to stack haybails.    Target Date 18   Date Met      Progress:      Goal Identifier 2   Goal Description Pt will have full AROM of B shoulders without pain for improved ability to reach into high cabinets.   Target Date 18   Date Met      Progress:     Goal Identifier 3   Goal Description   Pt will have 5/5 overall shoulder strength B without increased pain for improved ability to complete house hold chores.     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 She is able to manage her pain through her HEP. She has had improved AROM without pain. Pt was not assessed for d/c at last session as she had fallen off of her horse, broke 3 ribs, and had a partially collapsed lung. Phone call with patient on 5/15/2018, She states that her shoulder pain does return occasionally, but she is able to complete her HEP and has improvement in symptoms. Due to change with rib fxs she is choosing to d/c from shoulder PT at this time and will continue to work on her HEP for continued progression.     Plan:  Discharge from therapy.    Discharge:    Reason for Discharge: Patient chooses to discontinue therapy.    Equipment Issued: N/A    Discharge Plan: Patient to continue home program.

## 2018-05-15 NOTE — ADDENDUM NOTE
Encounter addended by: Dominique Ashford PT on: 5/15/2018 10:30 AM<BR>     Actions taken: Sign clinical note, Episode resolved

## 2018-05-22 ENCOUNTER — TELEPHONE (OUTPATIENT)
Dept: FAMILY MEDICINE | Facility: CLINIC | Age: 53
End: 2018-05-22

## 2018-05-22 DIAGNOSIS — S22.41XD CLOSED FRACTURE OF MULTIPLE RIBS OF RIGHT SIDE WITH ROUTINE HEALING, SUBSEQUENT ENCOUNTER: ICD-10-CM

## 2018-05-22 RX ORDER — TRAMADOL HYDROCHLORIDE 50 MG/1
50-100 TABLET ORAL EVERY 12 HOURS PRN
Qty: 40 TABLET | Refills: 0 | Status: SHIPPED | OUTPATIENT
Start: 2018-05-22 | End: 2018-12-12

## 2018-05-22 NOTE — TELEPHONE ENCOUNTER
Reason for Call:  Medication or medication refill:    Do you use a [facility]?  Name of the pharmacy and phone number for the current request:  Hard Copy    Name of the medication requested:   traMADol (ULTRAM) 50 MG tablet 40 tablet 0 5/14/2018     Sig - Route: Take 1-2 tablets ( mg) by mouth every 12 hours as needed for breakthrough pain maximum 4 tablet(s) per day. - Oral          Other request: please call and let patient know, would like to  today.    Can we leave a detailed message on this number? YES    Phone number patient can be reached at: Cell number on file:    Telephone Information:   Mobile [phone]       Best Time: any     Call taken on 5/22/2018 at 9:40 AM by Rosanne Carcamo

## 2018-05-22 NOTE — TELEPHONE ENCOUNTER
Called patient and informed that her prescription is ready to be picked up at the .  Klarissa Gill MA

## 2018-08-31 ENCOUNTER — TELEPHONE (OUTPATIENT)
Dept: FAMILY MEDICINE | Facility: CLINIC | Age: 53
End: 2018-08-31

## 2018-08-31 NOTE — TELEPHONE ENCOUNTER
8/31/2018    Attempt 1    Contacted patient in regards to scheduling VIP mammogram  Message on voicemail         Comments:       Outreach   Amina DEY

## 2018-09-06 NOTE — TELEPHONE ENCOUNTER
Per outreach, patient is aware of overdue screening and will call on their own time for scheduling.     Thanks    Outreach ,  Amina Hawkins

## 2018-11-07 ENCOUNTER — TELEPHONE (OUTPATIENT)
Dept: FAMILY MEDICINE | Facility: CLINIC | Age: 53
End: 2018-11-07

## 2018-11-07 DIAGNOSIS — F32.5 MAJOR DEPRESSION IN COMPLETE REMISSION (H): ICD-10-CM

## 2018-11-07 DIAGNOSIS — E03.9 HYPOTHYROIDISM, UNSPECIFIED TYPE: ICD-10-CM

## 2018-11-07 NOTE — LETTER
[address] [id number] [phone]        November 12, 2018    Elisa Al  [address] [facility] [address]          Dear Elisa,    We, at [facility] want to help you receive better care.  To improve the process of getting refills please schedule an appointment with your primary provider before your next refill. You can call the clinic at [phone] to schedule an appointment.      Sincerely,        Fanny Lisa NP

## 2018-11-09 RX ORDER — LEVOTHYROXINE SODIUM 100 UG/1
TABLET ORAL
Qty: 90 TABLET | Refills: 1 | Status: SHIPPED | OUTPATIENT
Start: 2018-11-09 | End: 2019-05-09

## 2018-11-09 RX ORDER — SERTRALINE HYDROCHLORIDE 100 MG/1
100 TABLET, FILM COATED ORAL DAILY
Qty: 30 TABLET | Refills: 0 | Status: SHIPPED | OUTPATIENT
Start: 2018-11-09 | End: 2018-12-05

## 2018-11-09 NOTE — TELEPHONE ENCOUNTER
"sertraline  Last Written Prescription Date:  05/15/2018  Last Fill Quantity: 90,  # refills: 1   Last office visit: 5/14/2018 with prescribing provider:      Future Office Visit:      Requested Prescriptions   Pending Prescriptions Disp Refills     sertraline (ZOLOFT) 100 MG tablet [Pharmacy Med Name: SERTRALINE HCL 100MG TABS] 90 tablet 1     Sig: TAKE ONE TABLET BY MOUTH ONCE DAILY    SSRIs Protocol Failed    11/7/2018  3:20 PM       Failed - PHQ-9 score less than 5 in past 6 months    Please review last PHQ-9 score.          Passed - Patient is age 18 or older       Passed - No active pregnancy on record       Passed - No positive pregnancy test in last 12 months       Passed - Recent (6 mo) or future (30 days) visit within the authorizing provider's specialty    Patient had office visit in the last 6 months or has a visit in the next 30 days with authorizing provider or within the authorizing provider's specialty.  See \"Patient Info\" tab in inbasket, or \"Choose Columns\" in Meds & Orders section of the refill encounter.            sertraline (ZOLOFT) 50 MG tablet [Pharmacy Med Name: SERTRALINE HCL 50MG TABS] 90 tablet 1     Sig: TAKE ONE TABLET BY MOUTH ONCE DAILY    SSRIs Protocol Failed    11/7/2018  3:20 PM       Failed - PHQ-9 score less than 5 in past 6 months    Please review last PHQ-9 score.          Passed - Patient is age 18 or older       Passed - No active pregnancy on record       Passed - No positive pregnancy test in last 12 months       Passed - Recent (6 mo) or future (30 days) visit within the authorizing provider's specialty    Patient had office visit in the last 6 months or has a visit in the next 30 days with authorizing provider or within the authorizing provider's specialty.  See \"Patient Info\" tab in inbasket, or \"Choose Columns\" in Meds & Orders section of the refill encounter.       Medication is being filled for 1 time refill only due to:  Patient needs to be seen because needs 6 month " "depression f/u.  Sending to scheduling for outreach.        Levothyroxine  Last Written Prescription Date:  05/15/2018  Last Fill Quantity: 90,  # refills: 1   Last office visit: 5/14/2018 with prescribing provider:      Future Office Visit:               levothyroxine (SYNTHROID/LEVOTHROID) 100 MCG tablet [Pharmacy Med Name: LEVOTHYROXINE SODIUM 100MCG TABS] 90 tablet 1     Sig: TAKE ONE TABLET BY MOUTH ONCE DAILY    Thyroid Protocol Passed    11/7/2018  3:20 PM       Passed - Patient is 12 years or older       Passed - Recent (12 mo) or future (30 days) visit within the authorizing provider's specialty    Patient had office visit in the last 12 months or has a visit in the next 30 days with authorizing provider or within the authorizing provider's specialty.  See \"Patient Info\" tab in inbasket, or \"Choose Columns\" in Meds & Orders section of the refill encounter.             Passed - Normal TSH on file in past 12 months    Recent Labs   Lab Test  02/21/18   1013   TSH  1.98             Passed - No active pregnancy on record    If patient is pregnant or has had a positive pregnancy test, please check TSH.         Passed - No positive pregnancy test in past 12 months    If patient is pregnant or has had a positive pregnancy test, please check TSH.            Prescription approved per [facility].  Iona Taylor RN on 11/9/2018 at 12:42 PM    "

## 2018-11-12 NOTE — TELEPHONE ENCOUNTER
Left message for patient to call back and speak with any .  Thank you,  Anupama London  Patient Representative    2nd attempt to reach patient, routing to team to send letter

## 2018-12-05 DIAGNOSIS — F32.5 MAJOR DEPRESSION IN COMPLETE REMISSION (H): ICD-10-CM

## 2018-12-06 RX ORDER — SERTRALINE HYDROCHLORIDE 100 MG/1
TABLET, FILM COATED ORAL
Qty: 30 TABLET | Refills: 0 | Status: SHIPPED | OUTPATIENT
Start: 2018-12-06 | End: 2018-12-12

## 2018-12-06 NOTE — TELEPHONE ENCOUNTER
ZOLOFT  Last Written Prescription Date:  11/9/18 WAS TOLD SHE NEEDED TO [facility] FOR FURTHER refills.  Last Fill Quantity: 30,  # refills: 0   Last office visit: 5/14/2018 with prescribing provider:  2/21/18   Future Office Visit:   Next 5 appointments (look out 90 days)     Dec 12, 2018  8:30 AM CST   [facility]    [address] [place] [id number] [phone]                 Pt has scheduled appt for next WEdnesday. Will refill X1 to get her to the appt PHQ9 score = 0  VENRA Romero

## 2018-12-12 ENCOUNTER — OFFICE VISIT (OUTPATIENT)
Dept: FAMILY MEDICINE | Facility: CLINIC | Age: 53
End: 2018-12-12
Payer: COMMERCIAL

## 2018-12-12 VITALS
HEART RATE: 106 BPM | RESPIRATION RATE: 24 BRPM | OXYGEN SATURATION: 100 % | SYSTOLIC BLOOD PRESSURE: 136 MMHG | DIASTOLIC BLOOD PRESSURE: 82 MMHG | WEIGHT: 148.9 LBS | BODY MASS INDEX: 28.13 KG/M2 | TEMPERATURE: 97.9 F

## 2018-12-12 DIAGNOSIS — F41.8 DEPRESSION WITH ANXIETY: ICD-10-CM

## 2018-12-12 PROCEDURE — 99214 OFFICE O/P EST MOD 30 MIN: CPT | Performed by: NURSE PRACTITIONER

## 2018-12-12 RX ORDER — BUPROPION HYDROCHLORIDE 150 MG/1
150 TABLET ORAL EVERY MORNING
Qty: 30 TABLET | Refills: 3 | Status: SHIPPED | OUTPATIENT
Start: 2018-12-12 | End: 2019-08-14

## 2018-12-12 RX ORDER — SERTRALINE HYDROCHLORIDE 100 MG/1
TABLET, FILM COATED ORAL
Qty: 30 TABLET | Refills: 0 | Status: SHIPPED | OUTPATIENT
Start: 2018-12-12 | End: 2019-01-02

## 2018-12-12 ASSESSMENT — ANXIETY QUESTIONNAIRES
GAD7 TOTAL SCORE: 16
1. FEELING NERVOUS, ANXIOUS, OR ON EDGE: NEARLY EVERY DAY
2. NOT BEING ABLE TO STOP OR CONTROL WORRYING: NEARLY EVERY DAY
5. BEING SO RESTLESS THAT IT IS HARD TO SIT STILL: NOT AT ALL
7. FEELING AFRAID AS IF SOMETHING AWFUL MIGHT HAPPEN: NEARLY EVERY DAY
6. BECOMING EASILY ANNOYED OR IRRITABLE: NEARLY EVERY DAY
3. WORRYING TOO MUCH ABOUT DIFFERENT THINGS: NEARLY EVERY DAY
IF YOU CHECKED OFF ANY PROBLEMS ON THIS QUESTIONNAIRE, HOW DIFFICULT HAVE THESE PROBLEMS MADE IT FOR YOU TO DO YOUR WORK, TAKE CARE OF THINGS AT HOME, OR GET ALONG WITH OTHER PEOPLE: SOMEWHAT DIFFICULT

## 2018-12-12 ASSESSMENT — PATIENT HEALTH QUESTIONNAIRE - PHQ9
5. POOR APPETITE OR OVEREATING: SEVERAL DAYS
SUM OF ALL RESPONSES TO PHQ QUESTIONS 1-9: 17
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
## 2018-12-13 ASSESSMENT — ANXIETY QUESTIONNAIRES: GAD7 TOTAL SCORE: 16

## 2019-01-02 ENCOUNTER — OFFICE VISIT (OUTPATIENT)
Dept: FAMILY MEDICINE | Facility: CLINIC | Age: 54
End: 2019-01-02
Payer: COMMERCIAL

## 2019-01-02 VITALS
SYSTOLIC BLOOD PRESSURE: 126 MMHG | HEART RATE: 100 BPM | RESPIRATION RATE: 24 BRPM | BODY MASS INDEX: 28.34 KG/M2 | WEIGHT: 150 LBS | TEMPERATURE: 97.2 F | DIASTOLIC BLOOD PRESSURE: 80 MMHG | OXYGEN SATURATION: 98 %

## 2019-01-02 DIAGNOSIS — Z12.31 VISIT FOR SCREENING MAMMOGRAM: Primary | ICD-10-CM

## 2019-01-02 DIAGNOSIS — F41.8 DEPRESSION WITH ANXIETY: ICD-10-CM

## 2019-01-02 PROCEDURE — 99213 OFFICE O/P EST LOW 20 MIN: CPT | Performed by: NURSE PRACTITIONER

## 2019-01-02 RX ORDER — SERTRALINE HYDROCHLORIDE 100 MG/1
TABLET, FILM COATED ORAL
Qty: 30 TABLET | Refills: 5 | Status: SHIPPED | OUTPATIENT
Start: 2019-01-02 | End: 2019-07-08

## 2019-01-02 RX ORDER — BUPROPION HYDROCHLORIDE 150 MG/1
150 TABLET ORAL EVERY MORNING
Qty: 30 TABLET | Refills: 3 | Status: CANCELLED | OUTPATIENT
Start: 2019-01-02

## 2019-01-02 ASSESSMENT — PAIN SCALES - GENERAL: PAINLEVEL: NO PAIN (0)

## 2019-01-02 NOTE — PROGRESS NOTES
SUBJECTIVE:   Elisa Al is a 53 year old female who presents to clinic today for the following health issues:      Medication Followup of Zoloft, Wellbutrin    Taking Medication as prescribed: yes    Side Effects:  None    Medication Helping Symptoms:  Yes, patient seems to have some high and low days, but doing much better       The patient has been on Zoloft 150 mg for a number of years.  She had several losses and increased stress over the summer and depression was worsening.  A month ago we started Wellbutrin  mg in addition to her Zoloft.  She is here today for recheck.  She states she is feeling much better.  She has been laughing.  She is feeling happy.  She is able to look forward, has not been ruminating on the negative events of the past year.    Problem list and histories reviewed & adjusted, as indicated.  Additional history: as documented    BP Readings from Last 3 Encounters:   01/02/19 126/80   12/12/18 136/82   05/14/18 128/84    Wt Readings from Last 3 Encounters:   01/02/19 68 kg (150 lb)   12/12/18 67.5 kg (148 lb 14.4 oz)   05/14/18 63.4 kg (139 lb 11.2 oz)                    Reviewed and updated as needed this visit by clinical staff       Reviewed and updated as needed this visit by Provider         ROS:  Constitutional, HEENT, cardiovascular, pulmonary, gi and gu systems are negative, except as otherwise noted.    OBJECTIVE:     /80   Pulse 100   Temp 97.2  F (36.2  C) (Temporal)   Resp 24   Wt 68 kg (150 lb)   LMP 01/13/2004   SpO2 98%   BMI 28.34 kg/m    Body mass index is 28.34 kg/m .   GENERAL: healthy, alert and no distress  PSYCH: mentation appears normal, affect normal/bright    Diagnostic Test Results:  No results found for this or any previous visit (from the past 24 hour(s)).    ASSESSMENT/PLAN:     Problem List Items Addressed This Visit     None      Visit Diagnoses     Visit for screening mammogram    -  Primary    Depression with anxiety        Relevant  Medications    sertraline (ZOLOFT) 50 MG tablet    sertraline (ZOLOFT) 100 MG tablet    Other Relevant Orders    *MA Screening Digital Bilateral         Continue Wellbutrin  mg daily  Continue Zoloft 150 mg daily  She will follow-up in clinic in May, at that time she is due for recheck of the hypothyroidism as well.  If have any worsening symptoms of depression, she will come in sooner        FLORENCIA Bonds CNP  [facility]

## 2019-01-07 DIAGNOSIS — F32.5 MAJOR DEPRESSION IN COMPLETE REMISSION (H): ICD-10-CM

## 2019-01-08 RX ORDER — SERTRALINE HYDROCHLORIDE 100 MG/1
TABLET, FILM COATED ORAL
Qty: 30 TABLET | Refills: 0 | OUTPATIENT
Start: 2019-01-08

## 2019-01-10 ENCOUNTER — HOSPITAL ENCOUNTER (OUTPATIENT)
Dept: MAMMOGRAPHY | Facility: CLINIC | Age: 54
Discharge: HOME | End: 2019-01-10
Attending: NURSE PRACTITIONER | Admitting: NURSE PRACTITIONER
Payer: COMMERCIAL

## 2019-01-10 DIAGNOSIS — Z12.31 VISIT FOR SCREENING MAMMOGRAM: ICD-10-CM

## 2019-01-10 PROCEDURE — 77067 SCR MAMMO BI INCL CAD: CPT

## 2019-01-15 ENCOUNTER — TELEPHONE (OUTPATIENT)
Dept: FAMILY MEDICINE | Facility: CLINIC | Age: 54
End: 2019-01-15

## 2019-01-15 NOTE — TELEPHONE ENCOUNTER
Panel Management Review      Patient has the following on her problem list:     Depression / Dysthymia review    Measure:  Needs PHQ-9 score of 4 or less during index window.  Administer PHQ-9 and if score is 5 or more, send encounter to provider for next steps.    5 - 7 month window range: 6/19    PHQ-9 SCORE 3/24/2014 2/21/2018 12/12/2018   PHQ-9 Total Score 0 - -   PHQ-9 Total Score - 0 17       If PHQ-9 recheck is 5 or more, route to provider for next steps.    Patient is due for:  None      Composite cancer screening  Chart review shows that this patient is due/due soon for the following Mammogram  Summary:    Patient is due/failing the following:   MAMMOGRAM    Action needed:   Patient needs referral/order: mammogram    Type of outreach:    already compelted    Questions for provider review:    None                                                                                                                                    ACase/MA

## 2019-01-15 NOTE — TELEPHONE ENCOUNTER
"  Panel Management Review      Patient has the following on her problem list: {Problems to Review:565626}      Composite cancer screening  Chart review shows that this patient is due/due soon for the following {CANCER SCREENIN}  Summary:    Patient is due/failing the following:   {[facility] DUE ITEMS:664909}    Action needed:   {[facility] ACTION:109174}    Type of outreach:    {BVCPTCONTACT:232733}    Questions for provider review:    {NONE:054018::\"None\"}                                                                                                                                    {staff signature}     Chart routed to {Care Team / Provider:675470} .          "

## 2019-05-09 ENCOUNTER — TELEPHONE (OUTPATIENT)
Dept: FAMILY MEDICINE | Facility: CLINIC | Age: 54
End: 2019-05-09

## 2019-05-09 DIAGNOSIS — E03.9 HYPOTHYROIDISM, UNSPECIFIED TYPE: ICD-10-CM

## 2019-05-09 NOTE — LETTER
[address] [id number] [phone]        May 13, 2019    Elisa Al  [address] [facility] [address]          Dear Elisa,    We, at [facility] want to help you receive better care.  To improve the process of getting refills please schedule an appointment with your primary provider before your next refill. You can call the clinic at [phone] to schedule an appointment.      Sincerely,        Nicholas Crook M.D.

## 2019-05-09 NOTE — LETTER
[address] [id number] [phone]        May 13, 2019    Elisa Al  [address] [facility] [address]          Dear Elisa,    We, at [facility] want to help you receive better care.  To improve the process of getting refills please schedule an appointment with your primary provider before your next refill. You can call the clinic at [phone] to schedule an appointment.      Sincerely,        Fanny Lisa NP

## 2019-05-10 RX ORDER — LEVOTHYROXINE SODIUM 100 UG/1
TABLET ORAL
Qty: 30 TABLET | Refills: 0 | Status: SHIPPED | OUTPATIENT
Start: 2019-05-10 | End: 2019-05-20

## 2019-05-10 NOTE — TELEPHONE ENCOUNTER
"Requested Prescriptions   Pending Prescriptions Disp Refills     levothyroxine (SYNTHROID/LEVOTHROID) 100 MCG tablet [Pharmacy Med Name: LEVOTHYROXINE 100MCG TABS] 90 tablet 1     Sig: TAKE ONE TABLET BY MOUTH ONCE DAILY   Last Written Prescription Date:  11/9/18  Last Fill Quantity: 90,  # refills: 1   Last office visit: 1/2/2019 with prescribing provider:     Future Office Visit:        Thyroid Protocol Failed - 5/9/2019  5:42 AM        Failed - Normal TSH on file in past 12 months     Recent Labs   Lab Test 02/21/18  1013   TSH 1.98              Passed - Patient is 12 years or older        Passed - Recent (12 mo) or future (30 days) visit within the authorizing provider's specialty     Patient had office visit in the last 12 months or has a visit in the next 30 days with authorizing provider or within the authorizing provider's specialty.  See \"Patient Info\" tab in inbasket, or \"Choose Columns\" in Meds & Orders section of the refill encounter.              Passed - Medication is active on med list        Passed - No active pregnancy on record     If patient is pregnant or has had a positive pregnancy test, please check TSH.          Passed - No positive pregnancy test in past 12 months     If patient is pregnant or has had a positive pregnancy test, please check TSH.        Routing refill request to provider for review/approval because:  Labs not current:  TSH    T'd up 1 month for provider review.    Rama Mendoza RN            "

## 2019-05-13 NOTE — TELEPHONE ENCOUNTER
2nd attempt to reach pt- left another message. Routing back to team to send letter.  Thank you,  Di Campbell- Patient Representative

## 2019-05-14 DIAGNOSIS — E03.9 HYPOTHYROIDISM, UNSPECIFIED TYPE: ICD-10-CM

## 2019-05-14 LAB — TSH SERPL DL<=0.005 MIU/L-ACNC: 2.56 MU/L (ref 0.4–4)

## 2019-05-14 PROCEDURE — 84443 ASSAY THYROID STIM HORMONE: CPT | Performed by: NURSE PRACTITIONER

## 2019-05-14 PROCEDURE — 36415 COLL VENOUS BLD VENIPUNCTURE: CPT | Performed by: NURSE PRACTITIONER

## 2019-05-20 DIAGNOSIS — E03.9 HYPOTHYROIDISM, UNSPECIFIED TYPE: ICD-10-CM

## 2019-05-20 RX ORDER — LEVOTHYROXINE SODIUM 100 UG/1
100 TABLET ORAL DAILY
Qty: 90 TABLET | Refills: 3 | Status: SHIPPED | OUTPATIENT
Start: 2019-05-20 | End: 2020-02-21

## 2019-07-08 DIAGNOSIS — F41.8 DEPRESSION WITH ANXIETY: ICD-10-CM

## 2019-07-09 RX ORDER — SERTRALINE HYDROCHLORIDE 100 MG/1
TABLET, FILM COATED ORAL
Qty: 30 TABLET | Refills: 0 | Status: SHIPPED | OUTPATIENT
Start: 2019-07-09 | End: 2019-08-14

## 2019-08-02 ENCOUNTER — TELEPHONE (OUTPATIENT)
Dept: FAMILY MEDICINE | Facility: CLINIC | Age: 54
End: 2019-08-02

## 2019-08-02 NOTE — TELEPHONE ENCOUNTER
Patient is due for a PHQ-9.  Index start date:4/13/2019  Index end date:8/11/2019    Please call patient.

## 2019-08-05 ASSESSMENT — PATIENT HEALTH QUESTIONNAIRE - PHQ9: SUM OF ALL RESPONSES TO PHQ QUESTIONS 1-9: 0

## 2019-08-05 NOTE — TELEPHONE ENCOUNTER
I have contacted the pt and updated a PHQ-9.     PHQ-9 SCORE 8/5/2019   PHQ-9 Total Score -   PHQ-9 Total Score 0     Archana Melchor CMA ([facility])

## 2019-08-14 ENCOUNTER — OFFICE VISIT (OUTPATIENT)
Dept: FAMILY MEDICINE | Facility: CLINIC | Age: 54
End: 2019-08-14
Payer: COMMERCIAL

## 2019-08-14 ENCOUNTER — HOSPITAL ENCOUNTER (OUTPATIENT)
Dept: GENERAL RADIOLOGY | Facility: CLINIC | Age: 54
Discharge: HOME | End: 2019-08-14
Attending: NURSE PRACTITIONER | Admitting: NURSE PRACTITIONER
Payer: COMMERCIAL

## 2019-08-14 VITALS
DIASTOLIC BLOOD PRESSURE: 80 MMHG | WEIGHT: 160 LBS | HEART RATE: 120 BPM | RESPIRATION RATE: 22 BRPM | SYSTOLIC BLOOD PRESSURE: 136 MMHG | BODY MASS INDEX: 30.21 KG/M2 | TEMPERATURE: 97.7 F | OXYGEN SATURATION: 97 % | HEIGHT: 61 IN

## 2019-08-14 DIAGNOSIS — Z00.00 ROUTINE GENERAL MEDICAL EXAMINATION AT A HEALTH CARE FACILITY: ICD-10-CM

## 2019-08-14 DIAGNOSIS — M25.562 LEFT KNEE PAIN, UNSPECIFIED CHRONICITY: ICD-10-CM

## 2019-08-14 DIAGNOSIS — Z11.59 ENCOUNTER FOR HEPATITIS C SCREENING TEST FOR LOW RISK PATIENT: ICD-10-CM

## 2019-08-14 DIAGNOSIS — F10.10 NONDEPENDENT ALCOHOL ABUSE, EPISODIC DRINKING BEHAVIOR: Primary | ICD-10-CM

## 2019-08-14 DIAGNOSIS — F41.8 DEPRESSION WITH ANXIETY: ICD-10-CM

## 2019-08-14 LAB — HCV AB SERPL QL IA: NONREACTIVE

## 2019-08-14 PROCEDURE — 99213 OFFICE O/P EST LOW 20 MIN: CPT | Mod: 25 | Performed by: NURSE PRACTITIONER

## 2019-08-14 PROCEDURE — 73562 X-RAY EXAM OF KNEE 3: CPT | Mod: TC

## 2019-08-14 PROCEDURE — 99396 PREV VISIT EST AGE 40-64: CPT | Mod: 25 | Performed by: NURSE PRACTITIONER

## 2019-08-14 PROCEDURE — 86803 HEPATITIS C AB TEST: CPT | Performed by: NURSE PRACTITIONER

## 2019-08-14 PROCEDURE — 90714 TD VACC NO PRESV 7 YRS+ IM: CPT | Performed by: NURSE PRACTITIONER

## 2019-08-14 PROCEDURE — 90471 IMMUNIZATION ADMIN: CPT | Performed by: NURSE PRACTITIONER

## 2019-08-14 PROCEDURE — 36415 COLL VENOUS BLD VENIPUNCTURE: CPT | Performed by: NURSE PRACTITIONER

## 2019-08-14 RX ORDER — HYDROXYZINE HYDROCHLORIDE 25 MG/1
25 TABLET, FILM COATED ORAL 2 TIMES DAILY PRN
Qty: 60 TABLET | Refills: 3 | Status: SHIPPED | OUTPATIENT
Start: 2019-08-14 | End: 2020-02-21

## 2019-08-14 RX ORDER — SERTRALINE HYDROCHLORIDE 100 MG/1
100 TABLET, FILM COATED ORAL DAILY
Qty: 90 TABLET | Refills: 1 | Status: SHIPPED | OUTPATIENT
Start: 2019-08-14 | End: 2020-02-07

## 2019-08-14 ASSESSMENT — PATIENT HEALTH QUESTIONNAIRE - PHQ9
5. POOR APPETITE OR OVEREATING: SEVERAL DAYS
SUM OF ALL RESPONSES TO PHQ QUESTIONS 1-9: 2

## 2019-08-14 ASSESSMENT — MIFFLIN-ST. JEOR: SCORE: 1263.14

## 2019-08-14 ASSESSMENT — ANXIETY QUESTIONNAIRES
IF YOU CHECKED OFF ANY PROBLEMS ON THIS QUESTIONNAIRE, HOW DIFFICULT HAVE THESE PROBLEMS MADE IT FOR YOU TO DO YOUR WORK, TAKE CARE OF THINGS AT HOME, OR GET ALONG WITH OTHER PEOPLE: NOT DIFFICULT AT ALL
1. FEELING NERVOUS, ANXIOUS, OR ON EDGE: NEARLY EVERY DAY
3. WORRYING TOO MUCH ABOUT DIFFERENT THINGS: NEARLY EVERY DAY
GAD7 TOTAL SCORE: 12
6. BECOMING EASILY ANNOYED OR IRRITABLE: SEVERAL DAYS
2. NOT BEING ABLE TO STOP OR CONTROL WORRYING: NEARLY EVERY DAY
7. FEELING AFRAID AS IF SOMETHING AWFUL MIGHT HAPPEN: SEVERAL DAYS
5. BEING SO RESTLESS THAT IT IS HARD TO SIT STILL: NOT AT ALL

## 2019-08-14 ASSESSMENT — PAIN SCALES - GENERAL: PAINLEVEL: NO PAIN (0)

## 2019-08-14 NOTE — PROGRESS NOTES
SUBJECTIVE:   CC: Elisa Al is an 54 year old woman who presents for preventive health visit.     In addition to her well exam the patient is here to follow-up management of depression and anxiety.  She discontinued Wellbutrin, noting some increased mood lability while taking it.  She is taking Zoloft 150 mg daily and feels that manages her depression quite well.  Anxiety however continues to be a problem.  She scores 12 on a KHANG 7.  She is always feeling anxious, does a lot of worrying.      She also continues to have worsening pain in the left knee.  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  She states it wants to give out on her at times.  With certain movements it pops and twists.  It does not lock.  She has increased pain with weightbearing activities and has noted swelling.    Healthy Habits:    Do you get at least three servings of calcium containing foods daily (dairy, green leafy vegetables, etc.)? yes    Amount of exercise or daily activities, outside of work: farm work    Problems taking medications regularly No has stopped Wellbutrin as she noticed it wasn't working anymore.    Medication side effects: No    Have you had an eye exam in the past two years? yes    Do you see a dentist twice per year? no    Do you have sleep apnea, excessive snoring or daytime drowsiness?no          Today's PHQ-2 Score:   PHQ-2 ( 1999 Pfizer) 8/5/2013 6/11/2012   Q1: Little interest or pleasure in doing things 0 0   Q2: Feeling down, depressed or hopeless 0 0   PHQ-2 Score 0 0       Abuse: Current or Past(Physical, Sexual or Emotional)- No  Do you feel safe in your environment? Yes    Social History     Tobacco Use     Smoking status: Current Some Day Smoker     Packs/day: 0.50     Years: 23.00     Pack years: 11.50     Smokeless tobacco: Former User     Quit date: 1/19/2013     Tobacco comment: one pack every 1 month-only on weekends    Substance Use Topics     Alcohol use: Yes     Alcohol/week: 0.0 oz     Comment: nightly     If you drink alcohol do you typically have >3 drinks per day or >7 drinks per week? Yes - AUDIT SCORE:  14  AUDIT - Alcohol Use Disorders Identification Test - Reproduced from the World Health Organization Audit 2001 (Second Edition) 8/14/2019   1.  How often do you have a drink containing alcohol? 4 or more times a week   2.  How many drinks containing alcohol do you have on a typical day when you are drinking? 5 or 6   3.  How often do you have five or more drinks on one occasion? Weekly   4.  How often during the last year have you found that you were not able to stop drinking once you had started? Less than monthly   5.  How often during the last year have you failed to do what was normally expected of you because of drinking? Never   6.  How often during the last year have you needed a first drink in the morning to get yourself going after a heavy drinking session? Never   7.  How often during the last year have you had a feeling of guilt or remorse after drinking? Daily or almost daily   8.  How often during the last year have you been unable to remember what happened the night before because of your drinking? Never   9.  Have you or someone else been injured because of your drinking? No   10. Has a relative, friend, doctor or other health care worker been concerned about your drinking or suggested you cut down? No   TOTAL SCORE 14                        Reviewed orders with patient.  Reviewed health maintenance and updated orders accordingly - Yes  BP Readings from Last 3 Encounters:   08/14/19 136/80   01/02/19 126/80   12/12/18 136/82    Wt Readings from Last 3 Encounters:   08/14/19 72.6 kg (160 lb)   01/02/19 68 kg (150 lb)   12/12/18 67.5 kg (148 lb 14.4 oz)                    Mammogram Screening: Patient over age 50, mutual decision to screen reflected in health maintenance.    Pertinent mammograms are reviewed  under the imaging tab.  History of abnormal Pap smear: Status post benign hysterectomy. Health Maintenance and Surgical History updated.     Reviewed and updated as needed this visit by clinical staff  Tobacco  Allergies  Meds  Problems  Med Hx  Surg Hx  Fam Hx  Soc Hx          Reviewed and updated as needed this visit by Provider  Tobacco  Allergies  Meds  Problems  Med Hx  Surg Hx  Fam Hx        Past Medical History:   Diagnosis Date     Anxiety      Benign neoplasm of other specified sites of skin 4/6/2006    Finger - right third finger proximal palmar surface     Degenerative disk disease 12/18/12    anterior exposure of L4-L5 and L5-S1 disk spaces for fusion     Depressive disorder      Gout, unspecified      Other and unspecified ovarian cyst      Unspecified hypothyroidism years     Unspecified intestinal obstruction 02/02/2005    Partial bowel obstruction, Obstipation.      Past Surgical History:   Procedure Laterality Date     C LAP,ABD/PERIT/OMENTUM,UNLIST  04/12/10    pelvic adhesion     C TOTAL ABDOM HYSTERECTOMY  09/08/2004    TAHRSO, Cystoscopy.     CARPAL TUNNEL RELEASE RT/LT  2005    right wrist     EXPLORE SPINE, REMOVE HARDWARE, COMBINED  4/7/2014    Procedure: COMBINED EXPLORE SPINE, REMOVE HARDWARE;  Hardware Removal Lumbar 4-Sacral 1, Exploration of Fusion;  Surgeon: Brenton Ashford MD;  Location: [facility]     FUSION LUMBAR ANTERIOR THREE+ LEVELS       HC COLONOSCOPY W BIOPSY  04/26/10     HC COLONOSCOPY W/WO BRUSH/WASH  5/25/2005     HC EXCISION TENDON SHEATH LESION, HAND/FINGR  04/17/2006    Right middle finger flexor sheath MP joint.     HC EXCISION TENDON SHEATH LESION, HAND/FINGR  08/07/06    Right middle finger     HC LAPAROSCOPY, SURGICAL; CHOLECYSTECTOMY  4/12/2004    Cholecystectomy, Laparoscopic     HC REMOVAL OF OVARY/TUBE(S)      left ovary removed secondary to cyst     HC REMOVAL OF TONSILS,12+ Y/O      Tonsils 12+y.o.     HC REVISE MEDIAN N/CARPAL TUNNEL SURG   "2005    Left     HC UGI ENDOSCOPY, SIMPLE EXAM  2005     OB History    Para Term  AB Living   0 0 0 0 0 0   SAB TAB Ectopic Multiple Live Births   0 0 0 0 0       ROS:  CONSTITUTIONAL:POSITIVE  for 10 pound weight gain in the past 7 months  INTEGUMENTARY/SKIN: NEGATIVE for worrisome rashes, moles or lesions  EYES: NEGATIVE for vision changes or irritation  ENT: NEGATIVE for ear, mouth and throat problems  RESP: NEGATIVE for significant cough or SOB  BREAST: NEGATIVE for masses, tenderness or discharge  CV: NEGATIVE for chest pain, palpitations or peripheral edema  GI: NEGATIVE for nausea, abdominal pain, heartburn, or change in bowel habits   menopausal female: no unusual urinary symptoms  MUSCULOSKELETAL:POSITIVE  for left knee pain  NEURO: NEGATIVE for weakness, dizziness or paresthesias  ENDOCRINE: NEGATIVE for temperature intolerance, skin/hair changes  PSYCHIATRIC: POSITIVE for anxiety and excessive alcohol intake    OBJECTIVE:   /80   Pulse 120   Temp 97.7  F (36.5  C) (Temporal)   Resp 22   Ht 1.549 m (5' 1\")   Wt 72.6 kg (160 lb)   LMP 2004   SpO2 97%   BMI 30.23 kg/m    EXAM:  GENERAL APPEARANCE: Well-nourished, well-hydrated.  No acute distress  EYES: Eyes grossly normal to inspection, PERRL and conjunctivae and sclerae normal  HENT: ear canals and TM's normal, nose and mouth without ulcers or lesions, oropharynx clear and oral mucous membranes moist  NECK: no adenopathy, no asymmetry, masses, or scars and thyroid normal to palpation  RESP: lungs clear to auscultation - no rales, rhonchi or wheezes  BREAST: normal without masses, tenderness or nipple discharge and no palpable axillary masses or adenopathy  CV: regular rate and rhythm, normal S1 S2, no S3 or S4, no murmur, click or rub, no peripheral edema and peripheral pulses strong  ABDOMEN: soft, nontender, no hepatosplenomegaly, no masses and bowel sounds normal  MS: Left knee is noted to have some mild " soft tissue edema in the suprapatellar area.  She notes pain to palpation along the lateral joint line.  She has full extension without pain, but does experience pain with flexion.  The joint feels stable, but she has increased pain with varus stress  SKIN: no suspicious lesions or rashes  NEURO: Normal strength and tone, sensory exam grossly normal, mentation intact and speech normal  PSYCH: Mentation normal.  She is tearful.  Becomes very agitated and upset when talking about the loss of friendship with close neighbors that had been friends of theirs for 18 years.        ASSESSMENT/PLAN:   1. Routine general medical examination at a health care facility  - C TD PRSERV FREE >=7 YRS ADS IM  - VACCINE ADMINISTRATION, INITIAL    2. Depression with anxiety  Continue Zoloft 150 mg daily.  Hydroxyzine 25 mg for anxiety.  May take this twice daily if needed.  Potential side effects were reviewed.  Referred to counseling  - sertraline (ZOLOFT) 50 MG tablet; Take 1 tablet (50 mg) by mouth daily  Dispense: 90 tablet; Refill: 1  - sertraline (ZOLOFT) 100 MG tablet; Take 1 tablet (100 mg) by mouth daily  Dispense: 90 tablet; Refill: 1  - hydrOXYzine (ATARAX) 25 MG tablet; Take 1 tablet (25 mg) by mouth 2 times daily as needed for anxiety  Dispense: 60 tablet; Refill: 3  - MENTAL HEALTH REFERRAL  - Adult; Outpatient Treatment; Individual/Couples/Family/Group Therapy/Health Psychology; [facility] [place] [facility] [phone]; We will contact you to schedule the appointment or please call with any questions    3. Encounter for hepatitis C screening test for low risk patient  - Hepatitis C antibody    4. Left knee pain, unspecified chronicity  -Will refer to orthopedist for further evaluation and management  - XR Knee Left 3 Views; Future    5. Nondependent alcohol abuse, episodic drinking behavior  Discussed her excessive drinking.  Patient states she never drank to excess in the past, started this about a year ago when  "the friendship with her neighbors fell apart.  She is having great deal of difficulty dealing with that particular loss, but is concerned about her excessive drinking as well.  At this time will have her review this with her counselor that she is seeing for depression and anxiety.  If she is needing further chemical dependency treatment, can refer on if necessary  - MENTAL HEALTH REFERRAL  - Adult; Outpatient Treatment; Individual/Couples/Family/Group Therapy/Health Psychology; [facility] [place]: [facility] [phone]; We will contact you to schedule the appointment or please call with any questions    COUNSELING:   Reviewed preventive health counseling, as reflected in patient instructions       Regular exercise       Healthy diet/nutrition       Vision screening       Immunizations    Vaccinated for: Td             Alcohol Use       Osteoporosis Prevention/Bone Health       Consider Hep C screening for patients born between 1945 and 1965    Estimated body mass index is 30.23 kg/m  as calculated from the following:    Height as of this encounter: 1.549 m (5' 1\").    Weight as of this encounter: 72.6 kg (160 lb).    Weight management plan: Discussed healthy diet and exercise guidelines     reports that she has been smoking.  She has a 11.50 pack-year smoking history. She quit smokeless tobacco use about 6 years ago.  Tobacco Cessation Action Plan: Information offered: Patient not interested at this time    Counseling Resources:  ATP IV Guidelines  Pooled Cohorts Equation Calculator  Breast Cancer Risk Calculator  FRAX Risk Assessment  ICSI Preventive Guidelines  Dietary Guidelines for Americans, 2010  USDA's MyPlate  ASA Prophylaxis  Lung CA Screening    FLORENCIA Bonds CNP  [facility]    Screening Questionnaire for Adult Immunization    Are you sick today?   No   Do you have allergies to medications, food, a vaccine component or latex?   Yes   Have you ever had a serious reaction " after receiving a vaccination?   No   Do you have a long-term health problem with heart disease, lung disease, asthma, kidney disease, metabolic disease (e.g. diabetes), anemia, or other blood disorder?   No   Do you have cancer, leukemia, HIV/AIDS, or any other immune system problem?   No   In the past 3 months, have you taken medications that affect  your immune system, such as prednisone, other steroids, or anticancer drugs; drugs for the treatment of rheumatoid arthritis, Crohn s disease, or psoriasis; or have you had radiation treatments?   No   Have you had a seizure, or a brain or other nervous system problem?   No   During the past year, have you received a transfusion of blood or blood     products, or been given immune (gamma) globulin or antiviral drug?   No   For women: Are you pregnant or is there a chance you could become        pregnant during the next month?   No   Have you received any vaccinations in the past 4 weeks?   No     Immunization questionnaire was positive for at least one answer.  Notified provider.        Per orders of Fanny Lisa, injection of TD given by Divya Larson. Patient instructed to remain in clinic for 15 minutes afterwards, and to report any adverse reaction to me immediately.       Screening performed by Divya Larson on 8/14/2019 at 9:07 AM.  Verified patient's name and date of birth to confirm prior to injection. Instructed patient to remain in clinic 20 minutes following injection, in case allergic reaction occurs seek medical staff. A Case MA

## 2019-08-14 NOTE — LETTER
August 15, 2019      Elisa Al  [address] [address] [address]        Dear MsMyron,    We are writing to inform you of your test results.     hepatitis C screen is negative     Resulted Orders   Hepatitis C antibody   Result Value Ref Range    Hepatitis C Antibody Nonreactive NR^Nonreactive      Comment:      Assay performance characteristics have not been established for newborns,   infants, and children         If you have any questions or concerns, please call the clinic at the number listed above.       Sincerely,        FLORENCIA Bonds CNP

## 2019-08-15 ENCOUNTER — TELEPHONE (OUTPATIENT)
Dept: FAMILY MEDICINE | Facility: CLINIC | Age: 54
End: 2019-08-15

## 2019-08-15 ASSESSMENT — ANXIETY QUESTIONNAIRES: GAD7 TOTAL SCORE: 12

## 2019-08-15 NOTE — TELEPHONE ENCOUNTER
----- Message from FLORENCIA Bonds CNP sent at 8/15/2019  7:27 AM CDT -----  Please let the patient know the x-ray shows some degenerative changes of the knee, little fluid in the joint.  She should keep her appointment with the orthopedist as scheduled

## 2019-08-15 NOTE — TELEPHONE ENCOUNTER
Patient was called. Voicemail was left for return call. Please relay message to patient and close encounter.  Fabiola Clark on 8/15/2019 at 8:28 AM

## 2019-08-15 NOTE — TELEPHONE ENCOUNTER
Patient called back, relayed message above.        Ximena Taylor ~ Patient Representative  [address] [address]  [email or web address]  [email or web address]  Office:  [phone]  Fax:  [phone]

## 2019-09-04 ENCOUNTER — OFFICE VISIT (OUTPATIENT)
Dept: ORTHOPEDICS | Facility: CLINIC | Age: 54
End: 2019-09-04
Payer: COMMERCIAL

## 2019-09-04 ENCOUNTER — ANCILLARY PROCEDURE (OUTPATIENT)
Dept: GENERAL RADIOLOGY | Facility: CLINIC | Age: 54
End: 2019-09-04
Attending: ORTHOPAEDIC SURGERY
Payer: COMMERCIAL

## 2019-09-04 VITALS
WEIGHT: 158.8 LBS | SYSTOLIC BLOOD PRESSURE: 120 MMHG | BODY MASS INDEX: 29.98 KG/M2 | DIASTOLIC BLOOD PRESSURE: 80 MMHG | HEIGHT: 61 IN

## 2019-09-04 DIAGNOSIS — M25.562 LEFT KNEE PAIN: ICD-10-CM

## 2019-09-04 DIAGNOSIS — M17.12 PRIMARY OSTEOARTHRITIS OF LEFT KNEE: Primary | ICD-10-CM

## 2019-09-04 PROCEDURE — 99213 OFFICE O/P EST LOW 20 MIN: CPT | Mod: 25 | Performed by: ORTHOPAEDIC SURGERY

## 2019-09-04 PROCEDURE — 73560 X-RAY EXAM OF KNEE 1 OR 2: CPT | Mod: TC

## 2019-09-04 PROCEDURE — 20610 DRAIN/INJ JOINT/BURSA W/O US: CPT | Mod: LT | Performed by: ORTHOPAEDIC SURGERY

## 2019-09-04 RX ORDER — TRIAMCINOLONE ACETONIDE 40 MG/ML
40 INJECTION, SUSPENSION INTRA-ARTICULAR; INTRAMUSCULAR ONCE
Status: COMPLETED | OUTPATIENT
Start: 2019-09-04 | End: 2019-09-04

## 2019-09-04 RX ADMIN — TRIAMCINOLONE ACETONIDE 40 MG: 40 INJECTION, SUSPENSION INTRA-ARTICULAR; INTRAMUSCULAR at 09:30

## 2019-09-04 ASSESSMENT — PAIN SCALES - GENERAL: PAINLEVEL: NO PAIN (0)

## 2019-09-04 ASSESSMENT — MIFFLIN-ST. JEOR: SCORE: 1257.69

## 2019-09-04 NOTE — LETTER
9/4/2019         RE: [place]  [address] [address]        Dear Colleague,    Thank you for referring your patient, Elisa Al, to the [facility]. Please see a copy of my visit note below.    Clinic Administered Medication Documentation    MEDICATION LIST:   Injection Documentation     Injection was administered by provider (please see MAR for given by information). Please see MAR and medication order for additional information.     Site: Joint injection   Medication Used: Kenalog 40mg     Exp: 4/2021  The following medication was given by Chas Gaviria, APRN, CNP, DNP:     MEDICATION: Kenalog 40mg/1ml  ROUTE: Joint Injection  SITE: left knee  DOSE: 1 mL  LOT #: [id number]  : [employer]  EXPIRATION DATE:  4/2021  NDC: [address]-0                            ORTHOPEDIC CONSULT      Chief Complaint: Elisa Al is a 54 year old female who is being seen for Chief Complaint   Patient presents with     Knee Pain     left knee pain     Consult     ref: Fanny Lisa       History of Present Illness:   Elisa Al is a 54 year old female who is seen in consultation at the request of ADAM Mojica for evaluation of left knee pain.  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  Since then she has had continued anterior, lateral, and medial knee pain. Non-radiating.  Constant aching.  Mild to moderate depending on pain.  Worst with bending, kneeling, putting pressure on the knee, twisting, working on farm. Better with tylenol, icing, 's compression sleeve.    Treatments tried:  tylenol, icing, 's compression sleeve., rest, elevation, activity modification. Cannot take NSAIDs due to allergy.   Also notes previous issues with spine, but denies any current radicular symptoms states this feels different than previous back issues.  Denies diabetes hx. States no previous problems with previous back steroid injections.     Patient's past medical, surgical, social and family histories reviewed.     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       Past Surgical History:   Procedure Laterality Date     C LAP,ABD/PERIT/OMENTUM,UNLIST  04/12/10    pelvic adhesion     C TOTAL ABDOM HYSTERECTOMY  09/08/2004    TAHRSO, Cystoscopy.     CARPAL TUNNEL RELEASE RT/LT  2005    right wrist     EXPLORE SPINE, REMOVE HARDWARE, COMBINED  4/7/2014    Procedure: COMBINED EXPLORE SPINE, REMOVE HARDWARE;  Hardware Removal Lumbar 4-Sacral 1, Exploration of Fusion;  Surgeon: Brenton Ashford MD;  Location: [facility] OR     FUSION LUMBAR ANTERIOR THREE+ LEVELS       HC COLONOSCOPY W BIOPSY  04/26/10     HC COLONOSCOPY W/WO BRUSH/WASH  5/25/2005     HC EXCISION TENDON SHEATH LESION, HAND/FINGR  04/17/2006    Right middle finger flexor sheath MP joint.     HC EXCISION TENDON SHEATH LESION, HAND/FINGR  08/07/06    Right middle finger     HC LAPAROSCOPY, SURGICAL; CHOLECYSTECTOMY  4/12/2004    Cholecystectomy, Laparoscopic     HC REMOVAL OF OVARY/TUBE(S)      left ovary removed secondary to cyst     HC REMOVAL OF TONSILS,12+ Y/O      Tonsils 12+y.o.     HC REVISE MEDIAN N/CARPAL TUNNEL SURG  8/22/2005    Left     HC UGI ENDOSCOPY, SIMPLE EXAM  5/25/2005       Medications:    Current Outpatient Medications on File Prior to Visit:  hydrOXYzine (ATARAX) 25 MG tablet Take 1 tablet (25 mg) by mouth 2 times daily as needed for anxiety  "  levothyroxine (SYNTHROID/LEVOTHROID) 100 MCG tablet Take 1 tablet (100 mcg) by mouth daily   sertraline (ZOLOFT) 100 MG tablet Take 1 tablet (100 mg) by mouth daily   sertraline (ZOLOFT) 50 MG tablet Take 1 tablet (50 mg) by mouth daily   TYLENOL EXTRA STRENGTH 500 MG OR TABS 2 tablets daily PRN     No current facility-administered medications on file prior to visit.     Allergies   Allergen Reactions     Aspirin Swelling     Erythromycin Ethylsuccinate GI Disturbance     gi upset     Ibuprofen Swelling     Naproxen      Zelnorm [Tegaserod Maleate]        Social History     Occupational History     Employer: [employer]   Tobacco Use     Smoking status: Current Some Day Smoker     Packs/day: 0.50     Years: 23.00     Pack years: 11.50     Smokeless tobacco: Former User     Quit date: 1/19/2013     Tobacco comment: one pack every 1 month-only on weekends   Substance and Sexual Activity     Alcohol use: Yes     Alcohol/week: 0.0 oz     Comment: nightly     Drug use: No     Sexual activity: Yes     Partners: Male       Family History   Problem Relation Age of Onset     Blood Disease Mother         leukemia-passed 2008     Cardiovascular Father         triple bypass     Respiratory Father         Black Lung     Alzheimer Disease Father      Cancer Maternal Grandmother         ovarian/brain ca       REVIEW OF SYSTEMS  10 point review systems performed otherwise negative as noted as per history of present illness.    Physical Exam:  Vitals: /80   Ht 1.549 m (5' 1\")   Wt 72 kg (158 lb 12.8 oz)   LMP 01/13/2004   BMI 30.00 kg/m     BMI= Body mass index is 30 kg/m .  Constitutional: healthy, alert and no acute distress   Psychiatric: mentation appears normal and affect normal/bright  NEURO: no focal deficits  RESP: Normal with easy respirations and no use of accessory muscles to breathe, no audible wheezing or retractions  CV: LLE:  no edema         Regular rate and rhythm by palpation  SKIN: No erythema, " rashes, excoriation, or breakdown. No evidence of infection.   JOINT/EXTREMITIES:left knee: mild effusion. Full motion without pain.  Tender along lateral joint line and some medial. No other focal or bony tenderness. No pain or apprehension with patellar manipulation. Normal quad tone.  Extensor intact. No instability or pain with valgus and varus testing.  Negative Lachman's.     Lymph: no appreciated lymphedema  GAIT: antalgic    Diagnostic Modalities:  left knee X-ray: moderate to severe medial joint space with small osteophyte formation. Mild lateral space loss with osteophyte formation. Patellofemoral compartment preserved but osteophyte formation to distal femur seen on merchant's view.  No soft tissue abnormalities. No fractures identified.    Independent visualization of the images was performed.      Impression: left knee primary osteaorthritis    Plan:  All of the above pertinent physical exam and imaging modalities findings was reviewed with Elisa. Discussed with patient, imaging, exam, history all consistent with OA.  Likely flared up after striking knee and not has resolved. No fractures visible on xray and no focal or bony tenderness. Discussed options    I recommend conservative care for the patient to include Tylenol, formal physical therapy, steroid injections. Today I provided or dispensed physical therapy.    The patient was counseled about an  injection, including discussion of risks (including infection), contents of the injection, rationale for performing the injection, and expected benefits of the injection. The skin was prepped with alcohol and betadine and then utilizing sterile technique an injection of the left knee joint from the anterolateral approach in the seated position was performed. The injection consisted 1ml of Kenalog (40mg per 1 ml) mixed with 3ml of 0.5% Marcaine. The patient tolerated the injection well, and there were no complications. The injection site was covered with a  Band-Aid. The injection was performed by FLORENCIA Grant, CNP, DNP    If any concerns for infection seek immediate medical evaluation either in the clinic or the ED.     Return to clinic PRN, or sooner as needed for changes.  Re-x-ray on return: No    Scribed by:  FLORENCIA Grant, CNP  9:37 AM  9/4/2019    I attest I have seen and evaluated the patient.  I agree with above impression and plan.  Champ Cottrell D.O.    Again, thank you for allowing me to participate in the care of your patient.        Sincerely,        Sahil Cottrell, DO

## 2019-09-04 NOTE — PROGRESS NOTES
Clinic Administered Medication Documentation    MEDICATION LIST:   Injection Documentation     Injection was administered by provider (please see MAR for given by information). Please see MAR and medication order for additional information.     Site: Joint injection   Medication Used: Kenalog 40mg     Exp: 4/2021  The following medication was given by FLORENCIA Crespo, CNP, DNP:     MEDICATION: Kenalog 40mg/1ml  ROUTE: Joint Injection  SITE: left knee  DOSE: 1 mL  LOT #: [id number]  : [employer]  EXPIRATION DATE:  4/2021  NDC: [address]-3

## 2019-09-19 ENCOUNTER — TELEPHONE (OUTPATIENT)
Dept: FAMILY MEDICINE | Facility: CLINIC | Age: 54
End: 2019-09-19

## 2019-09-19 NOTE — TELEPHONE ENCOUNTER
": 1965  PHONE #'s: [phone] (home)     PRESENTING PROBLEM:  Bilateral swelling of her neck and collar bone for past maybe weeks, at least, but getting bigger and more bothersome. 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    NURSING ASSESSMENT  Description:  \" It looks weird. I look like I have been lifting weights, but I haven't. I thought I should call ila now my lymph nodes are tender. I don't have a fever though and I feel fine otherwise.  I just look weird.\"  Onset/duration:   She is NOT sure of onset, kind of gradual , but more bothersome since the weekend.  Precip. factors:   Etiology unknown.  Assoc. Sx:  NO fever, is able to eat and drink just fine. Sleeps fine.   Improves/worsens Sx:   She is noting more swelling,   Pain scale (1-10)    My lymph nodes only hurt when I press in on them , otherwise I am fine.   Sx specific meds:  Not taking anything for this   LMP/preg/breast feeding:   NA  Last exam/Tx:  Has NOT been seen for this.     RECOMMENDED DISPOSITION:  See in 24 hours - imlan scheduled for tomorrow, 19 9 am with Dr. Reyna. She lives near [place]. She wanted an AM appt.   Will comply with recommendation: YES   If further questions/concerns or if Sx do not improve, worsen or new Sx develop, call your PCP or [facility] as soon as possible.    NOTES:  Disposition was determined by the first positive assessment question, therefore all previous assessment questions were negative.  Informed to check provider manual or call insurance company to assure coverage.    Guideline used: skin lesion/ bumps/ sores/ swelling  Telephone Triage Protocols for Nurses, Fifth Edition, Toña Dobbins, RN, RN    "

## 2019-09-20 ENCOUNTER — OFFICE VISIT (OUTPATIENT)
Dept: FAMILY MEDICINE | Facility: OTHER | Age: 54
End: 2019-09-20
Payer: COMMERCIAL

## 2019-09-20 VITALS
DIASTOLIC BLOOD PRESSURE: 92 MMHG | HEART RATE: 114 BPM | WEIGHT: 157.7 LBS | SYSTOLIC BLOOD PRESSURE: 168 MMHG | TEMPERATURE: 99 F | RESPIRATION RATE: 18 BRPM | BODY MASS INDEX: 29.8 KG/M2 | OXYGEN SATURATION: 100 %

## 2019-09-20 DIAGNOSIS — F32.5 MAJOR DEPRESSION IN COMPLETE REMISSION (H): ICD-10-CM

## 2019-09-20 DIAGNOSIS — E03.9 HYPOTHYROIDISM, UNSPECIFIED TYPE: ICD-10-CM

## 2019-09-20 DIAGNOSIS — J02.9 PHARYNGITIS, UNSPECIFIED ETIOLOGY: Primary | ICD-10-CM

## 2019-09-20 PROCEDURE — 99214 OFFICE O/P EST MOD 30 MIN: CPT | Performed by: INTERNAL MEDICINE

## 2019-09-20 RX ORDER — LORATADINE 10 MG/1
10 TABLET ORAL DAILY
Qty: 14 TABLET | Refills: 0 | Status: SHIPPED | OUTPATIENT
Start: 2019-09-20 | End: 2021-08-10

## 2019-09-20 RX ORDER — AZITHROMYCIN 250 MG/1
TABLET, FILM COATED ORAL
Qty: 6 TABLET | Refills: 0 | Status: SHIPPED | OUTPATIENT
Start: 2019-09-20 | End: 2019-09-27

## 2019-09-20 ASSESSMENT — PAIN SCALES - GENERAL: PAINLEVEL: MODERATE PAIN (4)

## 2019-09-20 NOTE — PROGRESS NOTES
"Subjective     Elisa Al is a 54 year old female who presents to clinic today for the following health issues:    HPI   Chief Complaint   Patient presents with     Neck Pain     swollen glands and coller bone for 1 week, painful                          Chief Complaint         The patient is a pleasant 54-year-old female who usually sees another provider but presents today with \"swollen lymph nodes in her neck\".  She is had this for about a week, she notes that they are slightly painful.  It hurts a little bit when she swallows.  She is had no respiratory compromise, stridor or distress.  Her past medical history is primary positive for a little bit of depression and hypothyroidism.  On initial examination it was seen that she does have some edema in her neck area and a little bit of erythema.  No signs of infection are noted.  She has no other symptoms or concerns.                         PAST, FAMILY,SOCIAL HISTORY:     Medical  History:   has a past medical history of Anxiety, Benign neoplasm of other specified sites of skin (4/6/2006), Degenerative disk disease (12/18/12), Depressive disorder, Gout, unspecified, Other and unspecified ovarian cyst, Unspecified hypothyroidism (years), and Unspecified intestinal obstruction (02/02/2005).     Surgical History:   has a past surgical history that includes REMOVAL OF TONSILS,12+ Y/O; REMOVAL OF OVARY/TUBE(S); LAPAROSCOPY, SURGICAL; CHOLECYSTECTOMY (4/12/2004); TOTAL ABDOM HYSTERECTOMY (09/08/2004); UPPER GI ENDOSCOPY,EXAM (5/25/2005); Colonoscopy w/wo [place] **Performed** (5/25/2005); REVISE MEDIAN N/CARPAL TUNNEL SURG (8/22/2005); EXCISION TENDON SHEATH LESION, HAND/FINGR (04/17/2006); EXCISION TENDON SHEATH LESION, HAND/FINGR (08/07/06); carpal tunnel release rt/lt (2005); LAP,ABD/PERIT/OMENTUM,UNLIST (04/12/10); COLONOSCOPY W BIOPSY (04/26/10); Explore spine, remove hardware, combined (4/7/2014); and Fusion lumbar anterior three+ levels.     Social History:   " reports that she has been smoking.  She has a 5.75 pack-year smoking history. She quit smokeless tobacco use about 6 years ago. She reports that she drinks alcohol. She reports that she does not use drugs.     Family History:  family history includes Alzheimer Disease in her father; Blood Disease in her mother; Cancer in her maternal grandmother; Cardiovascular in her father; Respiratory in her father.            MEDICATIONS  Current Outpatient Medications   Medication Sig Dispense Refill     azithromycin (ZITHROMAX) 250 MG tablet Take 2 tablets (500 mg) by mouth daily for 1 day, THEN 1 tablet (250 mg) daily for 4 days. 6 tablet 0     hydrOXYzine (ATARAX) 25 MG tablet Take 1 tablet (25 mg) by mouth 2 times daily as needed for anxiety (Patient taking differently: Take 25 mg by mouth daily as needed for anxiety ) 60 tablet 3     levothyroxine (SYNTHROID/LEVOTHROID) 100 MCG tablet Take 1 tablet (100 mcg) by mouth daily 90 tablet 3     loratadine (CLARITIN) 10 MG tablet Take 1 tablet (10 mg) by mouth daily 14 tablet 0     sertraline (ZOLOFT) 100 MG tablet Take 1 tablet (100 mg) by mouth daily 90 tablet 1     sertraline (ZOLOFT) 50 MG tablet Take 1 tablet (50 mg) by mouth daily 90 tablet 1     TYLENOL EXTRA STRENGTH 500 MG OR TABS 2 tablets daily PRN 60 0         --------------------------------------------------------------------------------------------------------------------                              Review of Systems       LUNGS: Pt denies: cough, excess sputum, hemoptysis, or shortness of breath.   HEART: Pt denies: chest pain, arrhythmia, syncope, tachy or bradyarrhythmia.   GI: Pt denies: nausea, vomiting, diarrhea, constipation, melena, or hematochezia.   NEURO: Pt denies: seizures, strokes, diplopia, weakness, paraesthesias, or paralysis.   SKIN: Pt denies: itching, rashes, discoloration, or specific lesions of concern. Denies recent hair loss.   PSYCH: The patient denies significant depression, anxiety, mood  imbalance. Specifically denies any suicidal ideation.                                     Examination    BP (!) 168/92 (BP Location: Right arm, Patient Position: Sitting, Cuff Size: Adult Regular)   Pulse 114   Temp 99  F (37.2  C) (Temporal)   Resp 18   Wt 71.5 kg (157 lb 11.2 oz)   LMP 01/13/2004   SpO2 100%   BMI 29.80 kg/m       Constitutional: The patient appears to be in no acute distress. The patient appears to be adequately hydrated. No acute respiratory or hemodynamic distress is noted at this time.   LUNGS: clear bilaterally, airflow is brisk, no intercostal retraction or stridor is noted. No coughing is noted during visit.   HEART:  regular without rubs, clicks, gallops, or murmurs. PMI is nondisplaced. Upstrokes are brisk. S1,S2 are heard.   GI: Abdomen is soft, without rebound, guarding or tenderness. Bowel sounds are appropriate. No renal bruits are heard.   NEURO: Pt is alert and appropriate. No neurologic lateralization is noted. Cranial nerves 2-12 are intact. Peripheral sensory and motor function are grossly normal.    SKIN:  warm and dry. No erythema, or rashes are noted. No specific lesions of concern are noted.    PSYCH: The patient appears grossly appropriate. Maintains good eye contact, does not have any jittery or atypical motion. Displays appropriate affect.   ENT: Pharynx is mildly-erythemous, minimal clear to minimal yellow PND, no significant nasal obstruction, TM's slightly red and a little retracted, hearing intact bilaterally. No carotid bruits are heard. No JVD seen. Thyroid is not nodular or enlarged.  Palpation the neck demonstrates no adenopathy at this time.  She does have some edema of these superficial tissue.  This extends to the collarbone.  I do not feel the thyroid to be enlarged.  Cervical range of motion is full.  No signs of submandibular abscess.                                             Decision Making    1. Pharyngitis, unspecified etiology  We will place  patient on an antihistamine as I believe a great portion of her symptoms are allergic but she does have some erythema and some purulent drainage.  Fluids aggressively  - azithromycin (ZITHROMAX) 250 MG tablet; Take 2 tablets (500 mg) by mouth daily for 1 day, THEN 1 tablet (250 mg) daily for 4 days.  Dispense: 6 tablet; Refill: 0  - loratadine (CLARITIN) 10 MG tablet; Take 1 tablet (10 mg) by mouth daily  Dispense: 14 tablet; Refill: 0    2. Hypothyroidism, unspecified type  Continue current dose of medication.  The last TSH in May was within normal limits.    3. Major depression in complete remission (H)  Continue current medication    Patient's blood pressure is elevated today but I do not believe this is hypertension.  She was afraid that she was going to die.  I have reassured her that she is not going to die at this time.  That seems to be helping her calm down a little bit.  If she does not have remarkable improvement in her edema at the next visit in 1 week, will set up a CAT scan to rule out possible adenopathy versus more serious concerns          45 minutes were spent with the patient in direct face-to-face examinationand and discussion regarding the diagnosis and treatment options pertinent to current problems.           I have carefully explained the diagnosis and treatment options to the patient.  The patient has displayed an understanding of the above, and all subsequent questions were answered.      DO ENRIKE Wan    Portions of this note were produced using [employer]  Although every attempt at real-time proof reading has been made, occasional grammar/syntax errors may have been missed.

## 2019-09-27 ENCOUNTER — OFFICE VISIT (OUTPATIENT)
Dept: FAMILY MEDICINE | Facility: OTHER | Age: 54
End: 2019-09-27
Payer: COMMERCIAL

## 2019-09-27 VITALS
HEART RATE: 112 BPM | DIASTOLIC BLOOD PRESSURE: 88 MMHG | TEMPERATURE: 98.7 F | WEIGHT: 158.4 LBS | OXYGEN SATURATION: 98 % | BODY MASS INDEX: 29.93 KG/M2 | RESPIRATION RATE: 16 BRPM | SYSTOLIC BLOOD PRESSURE: 126 MMHG

## 2019-09-27 DIAGNOSIS — E03.9 HYPOTHYROIDISM, UNSPECIFIED TYPE: ICD-10-CM

## 2019-09-27 DIAGNOSIS — R22.1 NECK SWELLING: Primary | ICD-10-CM

## 2019-09-27 PROCEDURE — 99213 OFFICE O/P EST LOW 20 MIN: CPT | Performed by: INTERNAL MEDICINE

## 2019-09-27 ASSESSMENT — PAIN SCALES - GENERAL: PAINLEVEL: NO PAIN (0)

## 2019-09-27 NOTE — PROGRESS NOTES
"Subjective     Elisa Al is a 54 year old female who presents to clinic today for the following health issues:    HPI   Chief Complaint   Patient presents with     RECHECK     recheck bp and swelling, no improvment                      Chief Complaint         The patient is a 54-year-old female who recently had a slight case of pharyngitis.  At the time she was concerned that she had swelling in her neck.  No adenopathy was palpated though her throat was somewhat erythematous.  She was treated with antibiotics and antihistamine and is doing much better from the standpoint but continues to have \"swelling in the neck\".  She notes that it is not painful.  She does have a history of hypothyroidism.  Thyroid is not nodular or enlarged on palpation.  She does have a some edema in the neck however.  No upper extremity swelling is noted.  There is no signs of venous engorgement.  She notes that her symptoms do not seem to change when she reclines.                       PAST, FAMILY,SOCIAL HISTORY:     Medical  History:   has a past medical history of Anxiety, Benign neoplasm of other specified sites of skin (4/6/2006), Degenerative disk disease (12/18/12), Depressive disorder, Gout, unspecified, Other and unspecified ovarian cyst, Unspecified hypothyroidism (years), and Unspecified intestinal obstruction (02/02/2005).     Surgical History:   has a past surgical history that includes REMOVAL OF TONSILS,12+ Y/O; REMOVAL OF OVARY/TUBE(S); LAPAROSCOPY, SURGICAL; CHOLECYSTECTOMY (4/12/2004); TOTAL ABDOM HYSTERECTOMY (09/08/2004); UPPER GI ENDOSCOPY,EXAM (5/25/2005); Colonoscopy w/wo [place] **Performed** (5/25/2005); REVISE MEDIAN N/CARPAL TUNNEL SURG (8/22/2005); EXCISION TENDON SHEATH LESION, HAND/FINGR (04/17/2006); EXCISION TENDON SHEATH LESION, HAND/FINGR (08/07/06); carpal tunnel release rt/lt (2005); LAP,ABD/PERIT/OMENTUM,UNLIST (04/12/10); COLONOSCOPY W BIOPSY (04/26/10); Explore spine, remove hardware, combined " (4/7/2014); and Fusion lumbar anterior three+ levels.     Social History:   reports that she has been smoking. She has a 5.75 pack-year smoking history. She quit smokeless tobacco use about 6 years ago. She reports current alcohol use. She reports that she does not use drugs.     Family History:  family history includes Alzheimer Disease in her father; Blood Disease in her mother; Cancer in her maternal grandmother; Cardiovascular in her father; Respiratory in her father.            MEDICATIONS  Current Outpatient Medications   Medication Sig Dispense Refill     hydrOXYzine (ATARAX) 25 MG tablet Take 1 tablet (25 mg) by mouth 2 times daily as needed for anxiety (Patient taking differently: Take 25 mg by mouth daily as needed for anxiety ) 60 tablet 3     levothyroxine (SYNTHROID/LEVOTHROID) 100 MCG tablet Take 1 tablet (100 mcg) by mouth daily 90 tablet 3     loratadine (CLARITIN) 10 MG tablet Take 1 tablet (10 mg) by mouth daily 14 tablet 0     sertraline (ZOLOFT) 100 MG tablet Take 1 tablet (100 mg) by mouth daily 90 tablet 1     sertraline (ZOLOFT) 50 MG tablet Take 1 tablet (50 mg) by mouth daily 90 tablet 1     TYLENOL EXTRA STRENGTH 500 MG OR TABS 2 tablets daily PRN 60 0         --------------------------------------------------------------------------------------------------------------------                              Review of Systems       LUNGS: Pt denies: cough, excess sputum, hemoptysis, or shortness of breath.   HEART: Pt denies: chest pain, arrhythmia, syncope, tachy or bradyarrhythmia.   GI: Pt denies: nausea, vomiting, diarrhea, constipation, melena, or hematochezia.   NEURO: Pt denies: seizures, strokes, diplopia, weakness, paraesthesias, or paralysis.   SKIN: Pt denies: itching, rashes, discoloration, or specific lesions of concern. Denies recent hair loss.   PSYCH: The patient denies significant depression, anxiety, mood imbalance. Specifically denies any suicidal ideation.                      "                Examination    /88 (BP Location: Left arm, Patient Position: Sitting, Cuff Size: Adult Regular)   Pulse 112   Temp 98.7  F (37.1  C) (Temporal)   Resp 16   Wt 71.8 kg (158 lb 6.4 oz)   LMP 01/13/2004   SpO2 98%   BMI 29.93 kg/m       Constitutional: The patient appears to be in no acute distress. The patient appears to be adequately hydrated. No acute respiratory or hemodynamic distress is noted at this time.   LUNGS: clear bilaterally, airflow is brisk, no intercostal retraction or stridor is noted. No coughing is noted during visit.   HEART:  regular without rubs, clicks, gallops, or murmurs. PMI is nondisplaced. Upstrokes are brisk. S1,S2 are heard.   GI: Abdomen is soft, without rebound, guarding or tenderness. Bowel sounds are appropriate. No renal bruits are heard.   NEURO: Pt is alert and appropriate. No neurologic lateralization is noted. Cranial nerves 2-12 are intact. Peripheral sensory and motor function are grossly normal.    SKIN:  warm and dry. No erythema, or rashes are noted. No specific lesions of concern are noted.    PSYCH: The patient appears grossly appropriate. Maintains good eye contact, does not have any jittery or atypical motion. Displays appropriate affect.                                           Decision Making    1. Neck swelling  We will obtain ultrasound of the neck to rule out adenopathy or source of \"fullness\".  - US Thyroid with Soft Tissue Head and Neck; Future    2. Hypothyroidism, unspecified type  Continue current thyroid supplementation  TSH recently checked and normal.                                 FOLLOW UP   I have asked the patient to make an appointment for followup with me in 2 weeks    I have carefully explained the diagnosis and treatment options to the patient.  The patient has displayed an understanding of the above, and all subsequent questions were answered.        DO ENRIKE Wan    Portions of this note were produced " using [employer]  Although every attempt at real-time proof reading has been made, occasional grammar/syntax errors may have been missed.

## 2019-09-30 ENCOUNTER — TELEPHONE (OUTPATIENT)
Dept: FAMILY MEDICINE | Facility: CLINIC | Age: 54
End: 2019-09-30

## 2019-09-30 NOTE — TELEPHONE ENCOUNTER
Called and LM for patient to call back. Please relay message below to patient.     Emilee Benson MA

## 2019-09-30 NOTE — TELEPHONE ENCOUNTER
Reason for Call:  Other Update    Detailed comments: pt seen CM on Friday for swelling in neck. Pt is scheduled for US on Thurs. Pt noticed over the weekend her lymph nodes in neck and arm pits were very swollen. Does CM still want to do an US on Thurs of neck only?    Phone Number Patient can be reached at: [phone]    Best Time:     Can we leave a detailed message on this number? YES    Call taken on 9/30/2019 at 7:55 AM by Makeda Shukla

## 2019-09-30 NOTE — TELEPHONE ENCOUNTER
Pt calling to run all her SX by Dr Cottrell she had a cortisone inj. Left knee on 9/4 could her neck and armpit swelling could have been a reaction form the knee inj. Please call pt to discuss. Thank You Maru

## 2019-09-30 NOTE — TELEPHONE ENCOUNTER
Pt called back. Stated she had a cortisone injection in her left knee 9/4. Could that have anything to do with the swelling in her neck?

## 2019-10-01 ENCOUNTER — TELEPHONE (OUTPATIENT)
Dept: ORTHOPEDICS | Facility: CLINIC | Age: 54
End: 2019-10-01

## 2019-10-01 NOTE — TELEPHONE ENCOUNTER
It is possible that the steroid injection may have caused some swelling.  Would like to wait and hold off on the CAT scan, that would be fine.  This is a decision she will need to make.    Axel

## 2019-10-01 NOTE — TELEPHONE ENCOUNTER
Per [facility] team this would be very unlikely reaction especially given time frame and symptoms. They agree with plan to purse the imaging and follow up recommended by Dr. Reyna.  Call back or follow up with us if any new symptoms or issues present.  Informed patient who understands and agrees with plan.     Rosetta COLBERT RN. . .  10/1/2019, 12:08 PM

## 2019-10-01 NOTE — TELEPHONE ENCOUNTER
Discussed with Dr. Cottrell as well.  Would be a low likelihood that it would be related to the injection, after 10 days would not be likely an allergic reaction and given the injection was to the knee does not sound like a local infection.  Would continue to follow with Dr. hines for workup.     FLROENCIA Crespo, CNP  Orthopedic Surgery

## 2019-10-01 NOTE — TELEPHONE ENCOUNTER
Spoke with patient and informed of message. She states she did get a call back from  That did the injection. He states it most likely is not related. That she should continue with the US and then follow up with pcp on Friday. She states she will keep her appointments.     Routing to pcp as KAVON Benson MA

## 2019-10-01 NOTE — TELEPHONE ENCOUNTER
Spoke to the patient. She states that she received a left knee kenalog injection with Dr. Cottrell on 9/4/19.      On 9/14/19 she noticed that she had developed swelling in the tissue of her neck, worse on the left side.  She also notes that her skin was red like sunburn and warm to the touch. She also had swelling in her bilateral arm pits, worse on the right side.     On 9/20/19 she got in to see Dr. Reyna who prescribed z-pack and loratidine.  She followed up again on 9/27/19 after having no improvement. An US of the neck was ordered and is scheduled to be done this Thursday. Follow up with Axel is scheduled for Friday.     She is wondering if this could somehow be related to the injection in her knee, possibly a reaction?     She requested that this be sent to Dr. Cottrell to see what his thoughts are. I instructed her to follow the plan set by Dr. Reyna for now and keep those appointments. I informed her that Dr. Cottrell is in the OR today and may not respond until he is back in clinic tomorrow.     Rosetta COLBERT RN. . .  10/1/2019, 10:26 AM

## 2019-10-03 ENCOUNTER — HOSPITAL ENCOUNTER (OUTPATIENT)
Dept: ULTRASOUND IMAGING | Facility: CLINIC | Age: 54
Discharge: HOME | End: 2019-10-03
Attending: INTERNAL MEDICINE | Admitting: INTERNAL MEDICINE
Payer: COMMERCIAL

## 2019-10-03 DIAGNOSIS — R22.1 NECK SWELLING: ICD-10-CM

## 2019-10-03 PROCEDURE — 76536 US EXAM OF HEAD AND NECK: CPT

## 2019-10-03 NOTE — LETTER
October 7, 2019      Elisa Al  [id number] [id number]        Dear ,    We are writing to inform you of your test results.    The ultrasound of the soft tissue of the neck was unremarkable.  No adenopathy (swollen glands) or thyroid irregularity was noted.     Feel free to contact me via the office or My Chart if you have any questions regarding the above.     Resulted Orders   US Head Neck Soft Tissue    Narrative    US HEAD AND NECK SOFT TISSUE 10/3/2019 11:13 AM    HISTORY: Neck swelling    COMPARISONS:  CT cervical spine dated 5/8/2018.    FINDINGS: Bilateral aspects of the neck were interrogated with  ultrasound and demonstrate no evidence for lymphadenopathy. There are  multiple normal-appearing lymph nodes throughout the bilateral  cervical zee chains. Visualized portions of the thyroid are  unremarkable. No definite thyroid nodules are identified on the  limited evaluation of the thyroid.      Impression    IMPRESSION: Essentially negative neck ultrasound. No evidence for  adenopathy or significant thyroid nodularity.    PARESH MANZO MD       If you have any questions or concerns, please call the clinic at the number listed above.       Sincerely,        [facility] ULTRASOUND ROOM 1

## 2019-10-04 ENCOUNTER — OFFICE VISIT (OUTPATIENT)
Dept: FAMILY MEDICINE | Facility: OTHER | Age: 54
End: 2019-10-04
Payer: COMMERCIAL

## 2019-10-04 VITALS
TEMPERATURE: 97.7 F | SYSTOLIC BLOOD PRESSURE: 138 MMHG | DIASTOLIC BLOOD PRESSURE: 82 MMHG | HEART RATE: 104 BPM | OXYGEN SATURATION: 100 % | WEIGHT: 160 LBS | RESPIRATION RATE: 16 BRPM | BODY MASS INDEX: 30.23 KG/M2

## 2019-10-04 DIAGNOSIS — R59.0 AXILLARY ADENOPATHY: Primary | ICD-10-CM

## 2019-10-04 DIAGNOSIS — Z23 NEED FOR PROPHYLACTIC VACCINATION AND INOCULATION AGAINST INFLUENZA: ICD-10-CM

## 2019-10-04 DIAGNOSIS — L03.211 CELLULITIS, FACE: ICD-10-CM

## 2019-10-04 LAB
ALBUMIN SERPL-MCNC: 3.9 G/DL (ref 3.4–5)
ALP SERPL-CCNC: 79 U/L (ref 40–150)
ALT SERPL W P-5'-P-CCNC: 25 U/L (ref 0–50)
ANION GAP SERPL CALCULATED.3IONS-SCNC: 6 MMOL/L (ref 3–14)
AST SERPL W P-5'-P-CCNC: 18 U/L (ref 0–45)
BILIRUB SERPL-MCNC: 0.3 MG/DL (ref 0.2–1.3)
BUN SERPL-MCNC: 12 MG/DL (ref 7–30)
CALCIUM SERPL-MCNC: 9.3 MG/DL (ref 8.5–10.1)
CHLORIDE SERPL-SCNC: 106 MMOL/L (ref 94–109)
CO2 SERPL-SCNC: 26 MMOL/L (ref 20–32)
CREAT SERPL-MCNC: 0.6 MG/DL (ref 0.52–1.04)
ERYTHROCYTE [DISTWIDTH] IN BLOOD BY AUTOMATED COUNT: 12.9 % (ref 10–15)
ERYTHROCYTE [SEDIMENTATION RATE] IN BLOOD BY WESTERGREN METHOD: 7 MM/H (ref 0–30)
GFR SERPL CREATININE-BSD FRML MDRD: >90 ML/MIN/{1.73_M2}
GLUCOSE SERPL-MCNC: 93 MG/DL (ref 70–99)
HCT VFR BLD AUTO: 43.3 % (ref 35–47)
HETEROPH AB SER QL: NEGATIVE
HGB BLD-MCNC: 14.6 G/DL (ref 11.7–15.7)
MCH RBC QN AUTO: 29.6 PG (ref 26.5–33)
MCHC RBC AUTO-ENTMCNC: 33.7 G/DL (ref 31.5–36.5)
MCV RBC AUTO: 88 FL (ref 78–100)
PLATELET # BLD AUTO: 231 10E9/L (ref 150–450)
POTASSIUM SERPL-SCNC: 3.9 MMOL/L (ref 3.4–5.3)
PROT SERPL-MCNC: 7.2 G/DL (ref 6.8–8.8)
RBC # BLD AUTO: 4.93 10E12/L (ref 3.8–5.2)
SODIUM SERPL-SCNC: 138 MMOL/L (ref 133–144)
WBC # BLD AUTO: 8.3 10E9/L (ref 4–11)

## 2019-10-04 PROCEDURE — 99213 OFFICE O/P EST LOW 20 MIN: CPT | Performed by: INTERNAL MEDICINE

## 2019-10-04 PROCEDURE — 36415 COLL VENOUS BLD VENIPUNCTURE: CPT | Performed by: INTERNAL MEDICINE

## 2019-10-04 PROCEDURE — 85027 COMPLETE CBC AUTOMATED: CPT | Performed by: INTERNAL MEDICINE

## 2019-10-04 PROCEDURE — 80053 COMPREHEN METABOLIC PANEL: CPT | Performed by: INTERNAL MEDICINE

## 2019-10-04 PROCEDURE — 85652 RBC SED RATE AUTOMATED: CPT | Performed by: INTERNAL MEDICINE

## 2019-10-04 PROCEDURE — 86308 HETEROPHILE ANTIBODY SCREEN: CPT | Performed by: INTERNAL MEDICINE

## 2019-10-04 RX ORDER — SULFAMETHOXAZOLE/TRIMETHOPRIM 800-160 MG
1 TABLET ORAL 2 TIMES DAILY
Qty: 14 TABLET | Refills: 0 | Status: SHIPPED | OUTPATIENT
Start: 2019-10-04 | End: 2020-04-20

## 2019-10-04 ASSESSMENT — PAIN SCALES - GENERAL: PAINLEVEL: NO PAIN (0)

## 2019-10-04 NOTE — PROGRESS NOTES
"Subjective     Elisa Al is a 54 year old female who presents to clinic today for the following health issues:    HPI   Chief Complaint   Patient presents with     Results     discuss neck US results, now has swelling under both armpits, confussion, and light headed                     Chief Complaint         The patient is a 54-year-old female who recently presented complaining of masses in her neck.  Palpation of the neck did not disclose any significant adenopathy or thyromegaly.  Patient was noting that there was a substantial change in that it was not the soft tissue/adiposity that I have suggested.  As such, an ultrasound was performed and is unremarkable.  She now presents with similar complaints regarding her axillary area.  She notes that she has \"swollen glands in the armpits\".  She notes that it is not painful.  She is had no fever or chills that she is been aware of.  She does complain of some occasional lightheadedness and states that she has occasional confusion.  She does have a history of hypothyroidism and some depression.  She notes that these are both currently stable.  She also has a small skin lesion on her face toward the right mandibular area which looks like it is a small area of cellulitis measuring approximately 2 inches in diameter and that it may have been the result of some mild digital manipulation of a comedone.  Given the concerns regarding possible adenopathy and the presence of the redness and inflammation, we will start her on antibiotic.                       PAST, FAMILY,SOCIAL HISTORY:     Medical  History:   has a past medical history of Anxiety, Benign neoplasm of other specified sites of skin (4/6/2006), Degenerative disk disease (12/18/12), Depressive disorder, Gout, unspecified, Other and unspecified ovarian cyst, Unspecified hypothyroidism (years), and Unspecified intestinal obstruction (02/02/2005).     Surgical History:   has a past surgical history that includes " REMOVAL OF TONSILS,12+ Y/O; REMOVAL OF OVARY/TUBE(S); LAPAROSCOPY, SURGICAL; CHOLECYSTECTOMY (4/12/2004); TOTAL ABDOM HYSTERECTOMY (09/08/2004); UPPER GI ENDOSCOPY,EXAM (5/25/2005); Colonoscopy w/wo [place] **Performed** (5/25/2005); REVISE MEDIAN N/CARPAL TUNNEL SURG (8/22/2005); EXCISION TENDON SHEATH LESION, HAND/FINGR (04/17/2006); EXCISION TENDON SHEATH LESION, HAND/FINGR (08/07/06); carpal tunnel release rt/lt (2005); LAP,ABD/PERIT/OMENTUM,UNLIST (04/12/10); COLONOSCOPY W BIOPSY (04/26/10); Explore spine, remove hardware, combined (4/7/2014); and Fusion lumbar anterior three+ levels.     Social History:   reports that she has been smoking. She has a 5.75 pack-year smoking history. She quit smokeless tobacco use about 6 years ago. She reports current alcohol use. She reports that she does not use drugs.     Family History:  family history includes Alzheimer Disease in her father; Blood Disease in her mother; Cancer in her maternal grandmother; Cardiovascular in her father; Respiratory in her father.            MEDICATIONS  Current Outpatient Medications   Medication Sig Dispense Refill     hydrOXYzine (ATARAX) 25 MG tablet Take 1 tablet (25 mg) by mouth 2 times daily as needed for anxiety (Patient taking differently: Take 25 mg by mouth daily as needed for anxiety ) 60 tablet 3     levothyroxine (SYNTHROID/LEVOTHROID) 100 MCG tablet Take 1 tablet (100 mcg) by mouth daily 90 tablet 3     loratadine (CLARITIN) 10 MG tablet Take 1 tablet (10 mg) by mouth daily 14 tablet 0     sertraline (ZOLOFT) 100 MG tablet Take 1 tablet (100 mg) by mouth daily 90 tablet 1     sertraline (ZOLOFT) 50 MG tablet Take 1 tablet (50 mg) by mouth daily 90 tablet 1     sulfamethoxazole-trimethoprim (BACTRIM DS/SEPTRA DS) 800-160 MG tablet Take 1 tablet by mouth 2 times daily 14 tablet 0     TYLENOL EXTRA STRENGTH 500 MG OR TABS 2 tablets daily PRN 60 0          --------------------------------------------------------------------------------------------------------------------                              Review of Systems       LUNGS: Pt denies: cough, excess sputum, hemoptysis, or shortness of breath.   HEART: Pt denies: chest pain, arrhythmia, syncope, tachy or bradyarrhythmia.   GI: Pt denies: nausea, vomiting, diarrhea, constipation, melena, or hematochezia.   NEURO: Pt denies: seizures, strokes, diplopia, weakness, paraesthesias, or paralysis.   SKIN: Pt denies: itching, rashes,  or additional lesions of concern. Denies recent hair loss.   PSYCH: The patient denies significant depression, anxiety, mood imbalance. Specifically denies any suicidal ideation.                                     Examination    /82 (BP Location: Left arm, Patient Position: Sitting, Cuff Size: Adult Regular)   Pulse 104   Temp 97.7  F (36.5  C) (Temporal)   Resp 16   Wt 72.6 kg (160 lb)   LMP 01/13/2004   SpO2 100%   BMI 30.23 kg/m       Constitutional: The patient appears to be in no acute distress. The patient appears to be adequately hydrated. No acute respiratory or hemodynamic distress is noted at this time.   LUNGS: clear bilaterally, airflow is brisk, no intercostal retraction or stridor is noted. No coughing is noted during visit.   HEART:  regular without rubs, clicks, gallops, or murmurs. PMI is nondisplaced. Upstrokes are brisk. S1,S2 are heard.   GI: Abdomen is soft, without rebound, guarding or tenderness. Bowel sounds are appropriate. No renal bruits are heard.   NEURO: Pt is alert and appropriate. No neurologic lateralization is noted. Cranial nerves 2-12 are intact. Peripheral sensory and motor function are grossly normal.    SKIN:  warm and dry. No erythema, or rashes are noted. No additional lesions of concern are noted.    PSYCH: The patient appears grossly appropriate. Maintains good eye contact, does not have any jittery or atypical motion. Displays  appropriate affect.                                             Decision Making    1. Cellulitis, face  Antibiotic, check CBC.  - sulfamethoxazole-trimethoprim (BACTRIM DS/SEPTRA DS) 800-160 MG tablet; Take 1 tablet by mouth 2 times daily  Dispense: 14 tablet; Refill: 0  - CBC with platelets  - Comprehensive metabolic panel    2. Axillary adenopathy  We will set up ultrasound and obligatory mammogram.  Check sedimentation rate for inflammatory causes.  - Comprehensive metabolic panel  - ESR: Erythrocyte sedimentation rate  - US Axillary Bilateral; Future  - Mononucleosis screen  - MA Diagnostic Digital Bilateral; Future    3. Need for prophylactic vaccination and inoculation against influenza  Given                         FOLLOW UP   I have asked the patient to make an appointment for followup with me in 1 week        I have carefully explained the diagnosis and treatment options to the patient.  The patient has displayed an understanding of the above, and all subsequent questions were answered.      DO ENRIKE Wan    Portions of this note were produced using [employer]  Although every attempt at real-time proof reading has been made, occasional grammar/syntax errors may have been missed.

## 2019-10-04 NOTE — LETTER
October 7, 2019      Elisa Al  [address] [facility] [address]        Dear ,    We are writing to inform you of your test results.    The chemistry panel was unremarkable with a normal blood sugar and kidney function.   Liver tests are all normal.   The sedimentation rate is 7 suggesting no systemic inflammation.   The mononucleosis test is negative.   Blood cell count is unremarkable without evidence of anemia or leukemia.     Feel free to contact me via the office or My Chart if you have any questions regarding the above.     Resulted Orders   CBC with platelets   Result Value Ref Range    WBC 8.3 4.0 - 11.0 10e9/L    RBC Count 4.93 3.8 - 5.2 10e12/L    Hemoglobin 14.6 11.7 - 15.7 g/dL    Hematocrit 43.3 35.0 - 47.0 %    MCV 88 78 - 100 fl    MCH 29.6 26.5 - 33.0 pg    MCHC 33.7 31.5 - 36.5 g/dL    RDW 12.9 10.0 - 15.0 %    Platelet Count 231 150 - 450 10e9/L   Comprehensive metabolic panel   Result Value Ref Range    Sodium 138 133 - 144 mmol/L    Potassium 3.9 3.4 - 5.3 mmol/L    Chloride 106 94 - 109 mmol/L    Carbon Dioxide 26 20 - 32 mmol/L    Anion Gap 6 3 - 14 mmol/L    Glucose 93 70 - 99 mg/dL    Urea Nitrogen 12 7 - 30 mg/dL    Creatinine 0.60 0.52 - 1.04 mg/dL    GFR Estimate >90 >60 mL/min/[1.73_m2]      Comment:      Non  GFR Calc  Starting 12/18/2018, serum creatinine based estimated GFR (eGFR) will be   calculated using the Chronic Kidney Disease Epidemiology Collaboration   (CKD-EPI) equation.      GFR Estimate If Black >90 >60 mL/min/[1.73_m2]      Comment:       GFR Calc  Starting 12/18/2018, serum creatinine based estimated GFR (eGFR) will be   calculated using the Chronic Kidney Disease Epidemiology Collaboration   (CKD-EPI) equation.      Calcium 9.3 8.5 - 10.1 mg/dL    Bilirubin Total 0.3 0.2 - 1.3 mg/dL    Albumin 3.9 3.4 - 5.0 g/dL    Protein Total 7.2 6.8 - 8.8 g/dL    Alkaline Phosphatase 79 40 - 150 U/L    ALT 25 0 - 50 U/L    AST  18 0 - 45 U/L   ESR: Erythrocyte sedimentation rate   Result Value Ref Range    Sed Rate 7 0 - 30 mm/h   Mononucleosis screen   Result Value Ref Range    Mononucleosis Screen Negative NEG^Negative       If you have any questions or concerns, please call the clinic at the number listed above.       Sincerely,        Brown Reyna, DO

## 2019-10-07 ENCOUNTER — ANCILLARY PROCEDURE (OUTPATIENT)
Dept: ULTRASOUND IMAGING | Facility: CLINIC | Age: 54
End: 2019-10-07
Attending: INTERNAL MEDICINE
Payer: COMMERCIAL

## 2019-10-07 ENCOUNTER — ANCILLARY PROCEDURE (OUTPATIENT)
Dept: MAMMOGRAPHY | Facility: CLINIC | Age: 54
End: 2019-10-07
Attending: INTERNAL MEDICINE
Payer: COMMERCIAL

## 2019-10-07 DIAGNOSIS — R59.0 AXILLARY ADENOPATHY: ICD-10-CM

## 2019-10-07 PROCEDURE — 77066 DX MAMMO INCL CAD BI: CPT | Performed by: RADIOLOGY

## 2019-10-07 PROCEDURE — 76882 US LMTD JT/FCL EVL NVASC XTR: CPT | Mod: LT | Performed by: RADIOLOGY

## 2019-10-07 PROCEDURE — G0279 TOMOSYNTHESIS, MAMMO: HCPCS | Performed by: RADIOLOGY

## 2019-10-07 NOTE — RESULT ENCOUNTER NOTE
Dear Elisa, your recent test results are attached.  The ultrasound of the soft tissue of the neck was unremarkable.  No adenopathy (swollen glands) or thyroid irregularity was noted.    Feel free to contact me via the office or My Chart if you have any questions regarding the above.

## 2019-10-07 NOTE — RESULT ENCOUNTER NOTE
Dear Elisa, your recent test results are attached.  The chemistry panel was unremarkable with a normal blood sugar and kidney function.  Liver tests are all normal.  The sedimentation rate is 7 suggesting no systemic inflammation.  The mononucleosis test is negative.  Blood cell count is unremarkable without evidence of anemia or leukemia.    Feel free to contact me via the office or My Chart if you have any questions regarding the above.

## 2019-10-08 ENCOUNTER — TELEPHONE (OUTPATIENT)
Dept: FAMILY MEDICINE | Facility: OTHER | Age: 54
End: 2019-10-08

## 2019-10-08 NOTE — LETTER
[facility]  [address] [facility] [id number] [phone]        October 9, 2019    Elisa Al  [address] [facility] [address]          Dear Elisa,    We have attempted to reach you by phone in regards to your care. Dr. Reyna advised you may be suited to get a 2nd opinion on the swelling. One options would be to see your pcp or another internal medicine specialist. He hesitates to do any more testing as nothing as found diagnostic to this point. Please call clinic with questions or concerns.       Sincerely,        Brown Reyna, DO

## 2019-10-08 NOTE — TELEPHONE ENCOUNTER
As I cannot appreciate or document the swelling that the patient is complaining of, I have very little more to offer her.  She may be well suited to get a second opinion.  One option would be to see her primary care provider or another internal medicine specialist.  I hesitate to do any more testing as nothing has been found diagnostic to this point.      Axel

## 2019-10-08 NOTE — TELEPHONE ENCOUNTER
Elisa calls for her lab results from 10/04.  Results are given to Elisa.    Elisa requests a call from Dr Reyna or his nurse regarding what are her next steps to figure out why she is having swelling.  Please advise.

## 2019-10-09 NOTE — TELEPHONE ENCOUNTER
"Spoke with patient, she is just going to \"wait and see\".  She had a negative mammogram and ultrasound, labs didn't show anything definitive, she will call if she feels that she needs to be seen again or if anything changes or gets worse.    Judith Perez XRO/  "

## 2019-10-09 NOTE — TELEPHONE ENCOUNTER
2nd attempt called and LM for patient to call back. Please relay message below. Letter sent as well.     Emilee Benson MA

## 2020-01-31 ENCOUNTER — TELEPHONE (OUTPATIENT)
Dept: FAMILY MEDICINE | Facility: CLINIC | Age: 55
End: 2020-01-31

## 2020-01-31 NOTE — TELEPHONE ENCOUNTER
Patient is due for a PHQ-9.  Index start date:10/13/2019  Index end date:2/10/2020    Please call patient.

## 2020-02-04 DIAGNOSIS — F41.8 DEPRESSION WITH ANXIETY: ICD-10-CM

## 2020-02-06 ASSESSMENT — PATIENT HEALTH QUESTIONNAIRE - PHQ9: SUM OF ALL RESPONSES TO PHQ QUESTIONS 1-9: 0

## 2020-02-06 NOTE — TELEPHONE ENCOUNTER
I have attempted to contact pt to update a PHQ-9. I left a message for pt to return my call. Please transfer pt to the [facility] team if I'm unavailable to take the call. Archana Melchor CMA ([facility])

## 2020-02-07 RX ORDER — SERTRALINE HYDROCHLORIDE 100 MG/1
TABLET, FILM COATED ORAL
Qty: 90 TABLET | Refills: 0 | Status: SHIPPED | OUTPATIENT
Start: 2020-02-07 | End: 2020-02-21

## 2020-02-07 NOTE — TELEPHONE ENCOUNTER
"sertraline  Last Written Prescription Date:  8/14/2019  Last Fill Quantity: 90,  # refills: 1   Last office visit: 10/4/2019 with prescribing provider:      Future Office Visit:   Next 5 appointments (look out 90 days)    Feb 13, 2020  9:00 AM CST  [facility] [address] [place] [id number] [phone]           Requested Prescriptions   Pending Prescriptions Disp Refills     sertraline (ZOLOFT) 50 MG tablet [Pharmacy Med Name: SERTRALINE HCL 50MG TABS] 90 tablet 1     Sig: TAKE ONE TABLET BY MOUTH ONCE DAILY       SSRIs Protocol Passed - 2/4/2020  2:54 PM        Passed - PHQ-9 score less than 5 in past 6 months     Please review last PHQ-9 score.   PHQ-9 SCORE 8/5/2019 8/14/2019 2/6/2020   PHQ-9 Total Score - - -   PHQ-9 Total Score 0 2 0             Passed - Medication is active on med list        Passed - Patient is age 18 or older        Passed - No active pregnancy on record        Passed - No positive pregnancy test in last 12 months        Passed - Recent (6 mo) or future (30 days) visit within the authorizing provider's specialty     Patient had office visit in the last 6 months or has a visit in the next 30 days with authorizing provider or within the authorizing provider's specialty.  See \"Patient Info\" tab in inbasket, or \"Choose Columns\" in Meds & Orders section of the refill encounter.            sertraline (ZOLOFT) 100 MG tablet [Pharmacy Med Name: SERTRALINE HCL 100MG TABS] 90 tablet 1     Sig: TAKE ONE TABLET BY MOUTH ONCE DAILY       SSRIs Protocol Passed - 2/4/2020  2:54 PM        Passed - PHQ-9 score less than 5 in past 6 months     Please review last PHQ-9 score.           Passed - Medication is active on med list        Passed - Patient is age 18 or older        Passed - No active pregnancy on record        Passed - No positive pregnancy test in last 12 months        Passed - Recent (6 mo) or " "future (30 days) visit within the authorizing provider's specialty     Patient had office visit in the last 6 months or has a visit in the next 30 days with authorizing provider or within the authorizing provider's specialty.  See \"Patient Info\" tab in inbasket, or \"Choose Columns\" in Meds & Orders section of the refill encounter.                   Iona Taylor RN on 2/7/2020 at 3:32 PM    "

## 2020-02-21 ENCOUNTER — OFFICE VISIT (OUTPATIENT)
Dept: FAMILY MEDICINE | Facility: CLINIC | Age: 55
End: 2020-02-21
Payer: COMMERCIAL

## 2020-02-21 VITALS
TEMPERATURE: 96.9 F | BODY MASS INDEX: 29.3 KG/M2 | HEART RATE: 109 BPM | WEIGHT: 155.2 LBS | DIASTOLIC BLOOD PRESSURE: 72 MMHG | RESPIRATION RATE: 18 BRPM | HEIGHT: 61 IN | OXYGEN SATURATION: 99 % | SYSTOLIC BLOOD PRESSURE: 136 MMHG

## 2020-02-21 DIAGNOSIS — Z76.89 ENCOUNTER TO ESTABLISH CARE: Primary | ICD-10-CM

## 2020-02-21 DIAGNOSIS — Z12.31 ENCOUNTER FOR SCREENING MAMMOGRAM FOR BREAST CANCER: ICD-10-CM

## 2020-02-21 DIAGNOSIS — F41.8 DEPRESSION WITH ANXIETY: ICD-10-CM

## 2020-02-21 DIAGNOSIS — Z00.00 ROUTINE GENERAL MEDICAL EXAMINATION AT A HEALTH CARE FACILITY: ICD-10-CM

## 2020-02-21 DIAGNOSIS — E03.9 HYPOTHYROIDISM, UNSPECIFIED TYPE: ICD-10-CM

## 2020-02-21 LAB — TSH SERPL DL<=0.005 MIU/L-ACNC: 1.66 MU/L (ref 0.4–4)

## 2020-02-21 PROCEDURE — 84443 ASSAY THYROID STIM HORMONE: CPT | Performed by: NURSE PRACTITIONER

## 2020-02-21 PROCEDURE — 36415 COLL VENOUS BLD VENIPUNCTURE: CPT | Performed by: NURSE PRACTITIONER

## 2020-02-21 PROCEDURE — 99214 OFFICE O/P EST MOD 30 MIN: CPT | Mod: 25 | Performed by: NURSE PRACTITIONER

## 2020-02-21 RX ORDER — LEVOTHYROXINE SODIUM 100 UG/1
100 TABLET ORAL DAILY
Qty: 90 TABLET | Refills: 3 | Status: SHIPPED | OUTPATIENT
Start: 2020-02-21 | End: 2021-03-10

## 2020-02-21 RX ORDER — HYDROXYZINE HYDROCHLORIDE 25 MG/1
25 TABLET, FILM COATED ORAL DAILY PRN
Qty: 90 TABLET | Refills: 3 | Status: SHIPPED | OUTPATIENT
Start: 2020-02-21 | End: 2020-05-21

## 2020-02-21 RX ORDER — SERTRALINE HYDROCHLORIDE 100 MG/1
100 TABLET, FILM COATED ORAL DAILY
Qty: 90 TABLET | Refills: 3 | Status: SHIPPED | OUTPATIENT
Start: 2020-02-21 | End: 2021-04-27

## 2020-02-21 ASSESSMENT — MIFFLIN-ST. JEOR: SCORE: 1241.36

## 2020-02-21 ASSESSMENT — PAIN SCALES - GENERAL: PAINLEVEL: NO PAIN (0)

## 2020-02-21 NOTE — PATIENT INSTRUCTIONS
Patient Education     Eating Heart-Healthy Food: Using the DASH Plan    Eating for your heart doesn t have to be hard or boring. You just need to know how to make healthier choices. The DASH eating plan has been developed to help you do just that. DASH stands for Dietary Approaches to Stop Hypertension. It is a plan that has been proven to be healthier for your heart and to lower your risk for high blood pressure. It can also help lower your risk for cancer, heart disease, osteoporosis, and diabetes.  Choosing from each food group  Choose foods from each of the food groups below each day. Try to get the recommended number of servings for each food group. The serving numbers are based on a diet of 2,000 calories a day. Talk to your doctor if you re unsure about your calorie needs. Along with getting the correct servings, the DASH plan also recommends a sodium intake less than 2,300 mg per day.        Grains  Servings: 6 to 8 a day  A serving is:    1 slice bread    1 ounce dry cereal    Half a cup cooked rice, pasta or cereal  Best choices: Whole grains and any grains high in fiber. Vegetables  Servings: 4 to 5 a day  A serving is:    1 cup raw leafy vegetable    Half a cup cut-up raw or cooked vegetable    Half a cup vegetable juice  Best choices: Fresh or frozen vegetables prepared without added salt or fat.   Fruits  Servings: 4 to 5 a day  A serving is:    1 medium fruit    One-quarter cup dried fruit    Half a cup fresh, frozen, or canned fruit    Half a cup of 100% fruit juices  Best choices: A variety of fresh fruits of different colors. Whole fruits are a better choice than fruit juices. Low-fat or fat-free dairy  Servings: 2 to 3 a day  A serving is:    1 cup milk    1 cup yogurt    One and a half ounces cheese  Best choices: Skim or 1% milk, low-fat or fat-free yogurt or buttermilk, and low-fat cheeses.         Lean meats, poultry, fish  Servings: 6 or fewer a day  A serving is:    1 ounce cooked meats,  poultry, or fish    1 egg  Best choices: Lean poultry and fish. Trim away visible fat. Broil, grill, roast, or boil instead of frying. Remove skin from poultry before eating. Limit how much red meat you eat.  Nuts, seeds, beans  Servings: 4 to 5 a week  A serving is:    One-third cup nuts (one and a half ounces)    2 tablespoons nut butter or seeds    Half a cup cooked dry beans or legumes  Best choices: Dry roasted nuts with no salt added, lentils, kidney beans, garbanzo beans, and whole younger beans.   Fats and oils  Servings: 2 to 3 a day  A serving is:    1 teaspoon vegetable oil    1 teaspoon soft margarine    1 tablespoon mayonnaise    2 tablespoons salad dressing  Best choices: Nut and vegetable oils (nontropical vegetable oils), such as olive and canola oil. Sweets  Servings: 5 a week or fewer  A serving is:    1 tablespoon sugar, maple syrup, or honey    1 tablespoon jam or jelly    1 half-ounce jelly beans (about 15)    1 cup lemonade  Best choices: Dried fruit can be a satisfying sweet. Choose low-fat sweets. And watch your serving sizes!      For more on the DASH eating plan, visit:  www.nhlbi.nih.gov/health/health-topics/topics/dash   Date Last Reviewed: 6/1/2016 2000-2019 [employer]. [address] [address], [place] [address]. All rights reserved. This information is not intended as a substitute for professional medical care. Always follow your healthcare professional's instructions.

## 2020-02-21 NOTE — PROGRESS NOTES
Subjective     Elisa Al is a 54 year old female who presents to clinic today for the following health issues:    Patient presents today to discuss establishing care with our team. She is recovering from an upper respiratory virus for which she has been  Symptomatic for about 7 days, started with high fever, cough and fatigue. Sinus congestions and cough is still there but she states fever has resolved. She is  Treated for hypothyroidisms with levothyroxine, and depression and anxiety, takes 150 mg daily of Zoloft and Hydroxazine 25 mg at bed time. She has no new symptoms of concern in this regard. 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 Mood is well managed on the Zoloft and  is supportive. She has been treated with 100 mcg of Levothyroxine for years with no new symptoms of any kind. She did have some lymphadenopathy worked up last October, US, Mammography, and all labs negative.   HPI   New Patient/Transfer of Care  Chief Complaint   Patient presents with     Establish Care           Patient Active Problem List   Diagnosis     Pain in joint, shoulder region     CARDIOVASCULAR SCREENING; LDL GOAL LESS THAN 160     Hypothyroidism, unspecified type     Major depression in complete remission (H)     Primary osteoarthritis of left knee     Past Surgical History:   Procedure Laterality Date     C LAP,ABD/PERIT/OMENTUM,UNLIST  04/12/10    pelvic adhesion     C TOTAL ABDOM HYSTERECTOMY  09/08/2004    TAHRSO, Cystoscopy.     CARPAL TUNNEL RELEASE RT/LT  2005    right wrist     EXPLORE SPINE, REMOVE HARDWARE, COMBINED  4/7/2014    Procedure: COMBINED EXPLORE SPINE, REMOVE HARDWARE;  Hardware Removal Lumbar 4-Sacral 1, Exploration of Fusion;  Surgeon: Brenton Ashford MD;  Location: [facility]     FUSION LUMBAR ANTERIOR THREE+ LEVELS       HC COLONOSCOPY W BIOPSY  04/26/10     HC COLONOSCOPY W/WO BRUSH/WASH  5/25/2005     HC EXCISION TENDON SHEATH LESION, HAND/FINGR  04/17/2006    Right middle finger flexor  sheath MP joint.     HC EXCISION TENDON SHEATH LESION, HAND/FINGR  08/07/06    Right middle finger     HC LAPAROSCOPY, SURGICAL; CHOLECYSTECTOMY  4/12/2004    Cholecystectomy, Laparoscopic     HC REMOVAL OF OVARY/TUBE(S)      left ovary removed secondary to cyst     HC REMOVAL OF TONSILS,12+ Y/O      Tonsils 12+y.o.     HC REVISE MEDIAN N/CARPAL TUNNEL SURG  8/22/2005    Left     HC UGI ENDOSCOPY, SIMPLE EXAM  5/25/2005       Social History     Tobacco Use     Smoking status: Current Every Day Smoker     Packs/day: 0.25     Years: 23.00     Pack years: 5.75     Smokeless tobacco: Former User     Quit date: 1/19/2013   Substance Use Topics     Alcohol use: Yes     Alcohol/week: 0.0 standard drinks     Comment: nightly     Family History   Problem Relation Age of Onset     Blood Disease Mother         leukemia-passed 2008     Cardiovascular Father         triple bypass     Respiratory Father         Black Lung     Alzheimer Disease Father      Cancer Maternal Grandmother         ovarian/brain ca         Current Outpatient Medications   Medication Sig Dispense Refill     hydrOXYzine 25 MG PO tablet Take 1 tablet (25 mg) by mouth daily as needed for anxiety 90 tablet 3     levothyroxine 100 MCG PO tablet Take 1 tablet (100 mcg) by mouth daily 90 tablet 3     sertraline 100 MG PO tablet Take 1 tablet (100 mg) by mouth daily 90 tablet 3     sertraline 50 MG PO tablet Take 1 tablet (50 mg) by mouth daily 90 tablet 3     TYLENOL EXTRA STRENGTH 500 MG OR TABS 2 tablets daily PRN 60 0     loratadine (CLARITIN) 10 MG tablet Take 1 tablet (10 mg) by mouth daily (Patient not taking: Reported on 2/21/2020) 14 tablet 0     sulfamethoxazole-trimethoprim (BACTRIM DS/SEPTRA DS) 800-160 MG tablet Take 1 tablet by mouth 2 times daily (Patient not taking: Reported on 2/21/2020) 14 tablet 0     Allergies   Allergen Reactions     Aspirin Swelling     Erythromycin Ethylsuccinate GI Disturbance     gi upset     Ibuprofen Swelling      "Naproxen      Zelnorm [Tegaserod Maleate]          Reviewed and updated as needed this visit by Provider         Review of Systems   ROS COMP: Constitutional, HEENT, cardiovascular, pulmonary, gi and gu systems are negative, except as otherwise noted.      Objective    /72   Pulse 109   Temp 96.9  F (36.1  C) (Temporal)   Resp 18   Ht 1.549 m (5' 1\")   Wt 70.4 kg (155 lb 3.2 oz)   LMP 01/13/2004   SpO2 99%   BMI 29.32 kg/m    Body mass index is 29.32 kg/m .  Physical Exam   Deferred visit was strictly to discuss establishing care.     Diagnostic Test Results:  Labs reviewed in [facility]  Reviewed US and Mammograms from 2019        Assessment & Plan     1. Encounter to establish care  -Spent a good deal of time discussing the patient's medical history, medication history, social history, and health maintenance needs moving forward.  To include her recent ultrasounds and mammography to assess the lymphadenopathy.    -Decision ultimately to establish care with our team.  Refills were completed to get her to her annual health care visit this fall.  No new changes with the lymphadenopathy, she is recovering from the viral illness which is been going around her community but no new swelling or issues with fatigue or weight loss or drenching night sweats that are of concern.    2. Routine general medical examination at a health care facility  -No new symptoms of concern in relation to the hypothyroid, will recheck TSH today, depending upon lab we will continue with current plan of care.  - TSH with free T4 reflex    3. Depression with anxiety  -Mood is well controlled on current plan of care will continue with 50 mg of Zoloft daily and hydroxyzine as needed at bedtime.  - sertraline 100 MG PO tablet; Take 1 tablet (100 mg) by mouth daily  Dispense: 90 tablet; Refill: 3  - sertraline 50 MG PO tablet; Take 1 tablet (50 mg) by mouth daily  Dispense: 90 tablet; Refill: 3  - hydrOXYzine 25 MG PO tablet; Take 1 tablet " (25 mg) by mouth daily as needed for anxiety  Dispense: 90 tablet; Refill: 3    4. Hypothyroidism, unspecified type    - levothyroxine 100 MCG PO tablet; Take 1 tablet (100 mcg) by mouth daily  Dispense: 90 tablet; Refill: 3    5. Encounter for screening mammogram for breast cancer  -She is due in October we will get this placed for her  - *MA Screening Digital Bilateral; Future     I spent >30 minutes of face to face time with the patient, >50% of which was spent counseling and coordination of care regarding establishing care with our team to include her medical history, medication history and health maintenance needs.  Did get all her medication refilled and the required labs for me to follow hypothyroidism.   -Continue home levothyroxine.    Patient instructed to call clinic with new or worsening symptoms prior to her next follow up appointment.     Reza Olson NP  [facility]

## 2020-04-08 ENCOUNTER — NURSE TRIAGE (OUTPATIENT)
Dept: NURSING | Facility: CLINIC | Age: 55
End: 2020-04-08

## 2020-04-08 NOTE — TELEPHONE ENCOUNTER
Reason for Disposition    Chest pain    Additional Information    Negative: SEVERE difficulty breathing (e.g., struggling for each breath, speaks in single words)    Negative: Difficult to awaken or acting confused (e.g., disoriented, slurred speech)    Negative: Bluish (or gray) lips or face now    Negative: Shock suspected (e.g., cold/pale/clammy skin, too weak to stand, low BP, rapid pulse)    Negative: Sounds like a life-threatening emergency to the triager    Negative: [1] COVID-19 suspected (e.g., cough, fever, shortness of breath) AND [2] public health department recommends testing    Negative: [1] COVID-19 exposure AND [2] no symptoms    Negative: COVID-19 and Breastfeeding, questions about    Negative: SEVERE or constant chest pain (Exception: mild central chest pain, present only when coughing)    Negative: MODERATE difficulty breathing (e.g., speaks in phrases, SOB even at rest, pulse 100-120)    Negative: Patient sounds very sick or weak to the triager    Negative: MILD difficulty breathing (e.g., minimal/no SOB at rest, SOB with walking, pulse <100)    [facility] Specific Disposition  - REQUIRED: [facility] Specific Patient Instructions  COVID 19 Nurse Triage Plan/Patient Instructions    Please be aware that novel coronavirus (COVID-19) may be circulating in the community. If you develop symptoms such as fever, cough, or SOB or if you have concerns about the presence of another infection including coronavirus (COVID-19), please contact your health care provider or visit [email or web address].       Patient to have an [facility] Visit with a provider (Preferred option). Follow System Ambulatory Workflow for COVID 19.     To do this follow these instructions:    1. Go to the website [email or web address]  2. Create an account (you will need your insurance information)  3. Start a new visit  4. Choose your diagnosis (e.g. COVID19)  5. Fill out the information about your symptoms  6. A provider will  reach out to you by text, phone call or video visit based on your request    Call Back If: Your symptoms worsen before you are able to complete your [facility] Visit with a provider.    Thank you for limiting contact with others, wearing a simple mask to cover your cough, practice good hand hygiene habits and accessing our virtual services where possible to limit the spread of this virus.    For more information about COVID19 and options for caring for yourself at home, please visit the CDC website at https://www.cdc.gov/coronavirus/2019-ncov/about/steps-when-sick.html  For more options for care at [facility], please visit our website at [email or web address]    For more information, please use the [place] Department of Health ([facility]) COVID-19 Hotlines (Interpreters available):   Health questions: Phone Number: [phone] or [phone] and Hours: 7 a.m. to 7 p.m.  Schools and  questions: Phone Number: [phone] or [phone] and Hours 7 a.m. to 7 p.m.    Protocols used: CORONAVIRUS (COVID-19) DIAGNOSED OR [id number] 3.30.20    She thinks her chest pain may be a pulled muscle. She has a productive cough. She will go to [email or web address] to set up a visit there. She has had the cough for two months now.  Beryl Grubbs RN  [facility]

## 2020-04-13 ENCOUNTER — OFFICE VISIT (OUTPATIENT)
Dept: FAMILY MEDICINE | Facility: CLINIC | Age: 55
End: 2020-04-13
Payer: COMMERCIAL

## 2020-04-13 ENCOUNTER — VIRTUAL VISIT (OUTPATIENT)
Dept: FAMILY MEDICINE | Facility: CLINIC | Age: 55
End: 2020-04-13
Payer: COMMERCIAL

## 2020-04-13 VITALS — HEIGHT: 61 IN | BODY MASS INDEX: 29.32 KG/M2

## 2020-04-13 DIAGNOSIS — M76.72 PERONEAL TENDONITIS, LEFT: Primary | ICD-10-CM

## 2020-04-13 DIAGNOSIS — M79.672 LEFT FOOT PAIN: Primary | ICD-10-CM

## 2020-04-13 DIAGNOSIS — R05.9 COUGH: ICD-10-CM

## 2020-04-13 PROCEDURE — 99213 OFFICE O/P EST LOW 20 MIN: CPT | Performed by: FAMILY MEDICINE

## 2020-04-13 PROCEDURE — 99207 ZZC NO BILLABLE SERVICE THIS VISIT: CPT | Performed by: FAMILY MEDICINE

## 2020-04-13 ASSESSMENT — PAIN SCALES - GENERAL: PAINLEVEL: WORST PAIN (10)

## 2020-04-13 NOTE — PROGRESS NOTES
"Subjective     Elisa Al is a 54 year old female who presents to clinic today for the following health issues:    HPI           Has had left lateral midfoot pain for past month.  Starting to worsen and is having problems walking.  No known injury when this started.  Is active working with horses.  Is having some swelling in area where her pain is located.  Also noted to have some left lateral leg pain.  Hurts to plantar flex her foot.    Reviewed and updated as needed this visit by Provider  Tobacco  Allergies  Meds  Problems  Med Hx  Surg Hx  Fam Hx         Review of Systems   ROS COMP: Constitutional, HEENT, cardiovascular, pulmonary, gi and gu systems are negative, except as otherwise noted.      Objective    Ht 1.549 m (5' 1\")   LMP 01/13/2004   Breastfeeding No   BMI 29.32 kg/m    Body mass index is 29.32 kg/m .  Physical Exam   Right Ankle Exam   Right ankle exam is normal.      Left Ankle Exam     Tenderness   Left ankle tenderness location: base of 5th metatarsal and posterior to the lateral malleolus.     Range of Motion   Dorsiflexion: normal   Left ankle plantar flexion: painful.   Eversion: normal   Left ankle inversion: painful.     Muscle Strength   The patient has normal left ankle strength.                     Assessment & Plan     ASSESSMENT/ORDERS:    ICD-10-CM    1. Peroneal tendonitis, left  M76.72      PLAN:  1.  Patient placed in CAM boot x1 week.  Told to wear boot 24/7 except for bathing.  Stressed wearing it while sleeping. Follow-up in 1 week for recheck. If pain still present, would recommend 4 more days and recheck.  If pain is no better at all or worse, then Dr. Karel Day, podiatry referral recommended.       BMI:   Estimated body mass index is 29.32 kg/m  as calculated from the following:    Height as of this encounter: 1.549 m (5' 1\").    Weight as of 2/21/20: 70.4 kg (155 lb 3.2 oz).               Return in about 1 week (around 4/20/2020) for recheck left " foot.    Aidan Seo MD  [facility]

## 2020-04-13 NOTE — PATIENT INSTRUCTIONS
Elisa, I am not charging you for this visit, since you will be coming in to the clinic today. Our team will call you to set up an appointment today.

## 2020-04-20 ENCOUNTER — VIRTUAL VISIT (OUTPATIENT)
Dept: FAMILY MEDICINE | Facility: CLINIC | Age: 55
End: 2020-04-20
Payer: COMMERCIAL

## 2020-04-20 DIAGNOSIS — M76.72 PERONEAL TENDONITIS, LEFT: Primary | ICD-10-CM

## 2020-04-20 PROCEDURE — 99213 OFFICE O/P EST LOW 20 MIN: CPT | Mod: TEL | Performed by: PHYSICIAN ASSISTANT

## 2020-04-20 ASSESSMENT — PAIN SCALES - GENERAL: PAINLEVEL: NO PAIN (0)

## 2020-04-20 NOTE — PROGRESS NOTES
"Elisa Al is a 54 year old female who is being evaluated via a billable telephone visit.      The patient has been notified of following:     \"This telephone visit will be conducted via a call between you and your physician/provider. We have found that certain health care needs can be provided without the need for a physical exam.  This service lets us provide the care you need with a short phone conversation.  If a prescription is necessary we can send it directly to your pharmacy.  If lab work is needed we can place an order for that and you can then stop by our lab to have the test done at a later time.    Telephone visits are billed at different rates depending on your insurance coverage. During this emergency period, for some insurers they may be billed the same as an in-person visit.  Please reach out to your insurance provider with any questions.    If during the course of the call the physician/provider feels a telephone visit is not appropriate, you will not be charged for this service.\"    Patient has given verbal consent for Telephone visit?  Yes    How would you like to obtain your AVS? Mail a copy    Subjective     Elisa Al is a 54 year old female who presents to clinic today for the following health issues:    Concern - recheck left foot  Onset: recheck    Description:   Recheck left foot    Intensity: mild    Progression of Symptoms:  improving    Accompanying Signs & Symptoms:  none    Previous history of similar problem:   nop    Precipitating factors:   Worsened by: walking, moving    Alleviating factors:  Improved by: walking boot    Therapies Tried and outcome: walking boot, good relief    Patient is a 54 year old female who calls in today to follow up on perineal tendonitis along her left lateral foot and lower leg. She was seen in clinic 1 week ago and placed in a CAM walker boot with instruction to wear 24/7 except for bathing. She was monitor for worsening or changing symptoms. " Today she states that her pain has improved from 10/10 at time of initial evaluation to 1/10 today. She does experience some discomfort when first placing pressure along the left foot in the AM which improves with use. She describes the discomfort as stepping on a pebble. She has checked the boot to make sure an actual pebble was not in the boot. Patient has tried walking without the boot with minimal discomfort.     Reviewed and updated as needed this visit by Provider         Review of Systems   ROS COMP: Constitutional, HEENT, cardiovascular, pulmonary, gi and gu systems are negative, except as otherwise noted.       Assessment/Plan:  1. Peroneal tendonitis, left  Patient will continue use of the boot for 3-4 additional days with slow return to normal activity. 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 Continue to stretch and apply ice as needed. Follow up with clinic for annual checkup or sooner if conditions change, worsen or fail to improve as expected.        Return in about 4 months (around 8/20/2020) for Return for scheduled annual checkup with PCP.      Phone call duration: 12 minutes    Jesus Alberto Li PA-C

## 2020-04-20 NOTE — NURSING NOTE
Health Maintenance Due   Topic Date Due     HIV SCREENING  08/11/1980     PNEUMOCOCCAL IMMUNIZATION 19-64 MEDIUM RISK (1 of 1 - PPSV23) 08/11/1984     ZOSTER IMMUNIZATION (1 of 2) 08/11/2015     INFLUENZA VACCINE (1) 09/01/2019     COLORECTAL CANCER SCREENING  04/26/2020     Eliza FROST LPN

## 2020-04-27 ENCOUNTER — TELEPHONE (OUTPATIENT)
Dept: FAMILY MEDICINE | Facility: CLINIC | Age: 55
End: 2020-04-27

## 2020-04-27 NOTE — TELEPHONE ENCOUNTER
----- Message from Aidan Seo MD sent at 4/27/2020  9:32 AM CDT -----  Regarding: RE: Boot  The boot is hers to keep.  It is something that she had to purchase.  They are not rented.  I cc'd our care team so they could call her and let her know.    Aidan  ----- Message -----  From: Jesus Alberto Li PA-C  Sent: 4/20/2020  11:20 AM CDT  To: Aidan Seo MD  Subject: Boot                                             Dr. Seo,    I spoke with Elisa today, April 20, 2020, regarding her pain. It has improved a great deal. She has been able to ambulate with estimated pain at 1/10 or less. She was wondering if you had wanted her to return the boot or if it is hers to keep. She was under the impression that it is to be returned.     Jesus Alberto Li PA-C on 4/20/2020 at 11:21 AM

## 2020-04-27 NOTE — LETTER
April 28, 2020      Elisa Al  [id number] [id number]        Dear Elisa,     We care about your health and have made several attempts to contact you at the number we have listed. The boot is yours to keep, and it's something that you purchased.     If you have any further questions please call us at [phone].       Sincerely,        Aidan Seo MD/ac

## 2020-04-27 NOTE — TELEPHONE ENCOUNTER
Tried to reach patient, left message for patient to call the clinic back.    Aubrie Neal, [facility]

## 2020-04-28 NOTE — TELEPHONE ENCOUNTER
Letter mailed with message below.     Concepcion Nichols CMA ([facility]) 4/28/2020

## 2020-04-29 ENCOUNTER — TELEPHONE (OUTPATIENT)
Dept: FAMILY MEDICINE | Facility: CLINIC | Age: 55
End: 2020-04-29

## 2020-04-29 NOTE — TELEPHONE ENCOUNTER
Reason for call:  Patient reporting a symptom    Symptom or request: Patient was told to call and inform Jesus Alberto to inform him about her foot pain. It was getting much better, but then on Friday she felt something pop. Would like a call back to discuss.     Duration (how long have symptoms been present):     Have you been treated for this before? Yes    Additional comments:     Phone Number patient can be reached at:  Home number on file [phone] (home)    Best Time:  any    Can we leave a detailed message on this number:  YES    Call taken on 4/29/2020 at 11:09 AM by Malia Jacobs CNA

## 2020-04-30 NOTE — TELEPHONE ENCOUNTER
Tried to reach patient, left message for patient to call the clinic back.    Aubrie Neal, [facility] [phone]

## 2020-04-30 NOTE — TELEPHONE ENCOUNTER
Tried to reach patient, left message for patient to call the clinic back.    Aubrie Neal, [facility] [phone]

## 2020-04-30 NOTE — TELEPHONE ENCOUNTER
Please reach out to the patient to clarify her specific concerns. Let her know that I am seeing patients throughout the day and may not be able to get back to her until later this afternoon. If she would like I can place a referral for her to consult with podiatry.    Jesus Alberto Li PA-C on 4/30/2020 at 9:00 AM

## 2020-05-01 NOTE — TELEPHONE ENCOUNTER
Left detailed message with Jesus Alberto Li's message.  Told her to give us a call back with her concerns and thoughts.  Closing enc, this was 3rd attempt to reach patient.  Josephine Ware, CMA

## 2020-08-03 ENCOUNTER — OFFICE VISIT (OUTPATIENT)
Dept: ORTHOPEDICS | Facility: CLINIC | Age: 55
End: 2020-08-03
Payer: COMMERCIAL

## 2020-08-03 VITALS
BODY MASS INDEX: 28.13 KG/M2 | WEIGHT: 149 LBS | HEIGHT: 61 IN | SYSTOLIC BLOOD PRESSURE: 140 MMHG | DIASTOLIC BLOOD PRESSURE: 70 MMHG

## 2020-08-03 DIAGNOSIS — M17.12 PRIMARY OSTEOARTHRITIS OF LEFT KNEE: Primary | ICD-10-CM

## 2020-08-03 PROCEDURE — 20610 DRAIN/INJ JOINT/BURSA W/O US: CPT | Mod: LT | Performed by: ORTHOPAEDIC SURGERY

## 2020-08-03 RX ORDER — TRIAMCINOLONE ACETONIDE 40 MG/ML
40 INJECTION, SUSPENSION INTRA-ARTICULAR; INTRAMUSCULAR ONCE
Status: COMPLETED | OUTPATIENT
Start: 2020-08-03 | End: 2020-08-03

## 2020-08-03 RX ADMIN — TRIAMCINOLONE ACETONIDE 40 MG: 40 INJECTION, SUSPENSION INTRA-ARTICULAR; INTRAMUSCULAR at 09:15

## 2020-08-03 ASSESSMENT — MIFFLIN-ST. JEOR: SCORE: 1213.24

## 2020-08-03 ASSESSMENT — PAIN SCALES - GENERAL: PAINLEVEL: MODERATE PAIN (4)

## 2020-08-03 NOTE — LETTER
8/3/2020         RE: Elisa Al  [address] [facility] [address]        Dear Colleague,    Thank you for referring your patient, Elisa Al, to the [facility]. Please see a copy of my visit note below.    Elisa to follow up with Primary Care provider regarding elevated blood pressure.  Clinic Administered Medication Documentation      Injection Documentation     Injection was administered by provider (please see MAR for given by information). Please see MAR and medication order for additional information.     Site: Joint injection   Medication Used: Kenalog 40mg   Expiration Date:  3/2022  The following medication was given by Chas Gaviria, APRN, CNP, DNP:     MEDICATION: Kenalog 40mg/1ml  ROUTE: Joint Injection  SITE: left knee  DOSE: 1 mL  LOT #: [id number]  : [email or web address]  EXPIRATION DATE:  3/2022  NDC: [address]-8                        Pippa/OSKAR     Office Visit-Follow up    Chief Complaint: Elisa Al is a 54 year old female who is being seen for   Chief Complaint   Patient presents with     RECHECK     left knee primary osteaorthritis       History of Present Illness:   Today's visit  Returns to clinic. States got a good 10 months of total relief from the injection. Worked very well.  Would like this repeated today. The pain returned about 1 month ago. Same pain as previous only more intense. No new injuries.  Unable to take NSAIDs due to allergy.   9/4/19 visit  Elisa Al is a 54 year old female who is seen in consultation at the request of ADAM Mojica for evaluation of left knee pain.  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  Since then she has had continued anterior, lateral, and medial knee pain. Non-radiating.  Constant aching.  Mild to moderate depending on pain.  Worst with bending, kneeling, putting pressure on the  "knee, twisting, working on farm. Better with tylenol, icing, 's compression sleeve.    Treatments tried:  tylenol, icing, 's compression sleeve., rest, elevation, activity modification. Cannot take NSAIDs due to allergy.   Also notes previous issues with spine, but denies any current radicular symptoms states this feels different than previous back issues. Denies diabetes hx. States no previous problems with previous back steroid injections.   Social History     Occupational History     Employer: [employer]   Tobacco Use     Smoking status: Current Every Day Smoker     Packs/day: 0.25     Years: 23.00     Pack years: 5.75     Smokeless tobacco: Former User     Quit date: 1/19/2013   Substance and Sexual Activity     Alcohol use: Yes     Alcohol/week: 0.0 standard drinks     Comment: nightly     Drug use: No     Sexual activity: Yes     Partners: Male       REVIEW OF SYSTEMS  General: negative for, night sweats, dizziness, fatigue  Resp: No shortness of breath and no cough  CV: negative for chest pain, syncope or near-syncope  GI: negative for nausea, vomiting and diarrhea  : negative for dysuria and hematuria  Musculoskeletal: as above  Neurologic: negative for syncope   Hematologic: negative for bleeding disorder    Physical Exam:  Vitals: BP (!) 140/70   Ht 1.549 m (5' 1\")   Wt 67.6 kg (149 lb)   LMP 01/13/2004   BMI 28.15 kg/m    BMI= Body mass index is 28.15 kg/m .  Constitutional: healthy, alert and no acute distress   Psychiatric: mentation appears normal and affect normal/bright  NEURO: no focal deficits  RESP: Normal with easy respirations and no use of accessory muscles to breathe, no audible wheezing or retractions  CV: LLE:  no edema         Regular rate and rhythm by palpation  SKIN: No erythema, rashes, excoriation, or breakdown. No evidence of infection.   JOINT/EXTREMITIES:left knee: moderate effusion. Full motion without pain some crepitus.  Tender mostly along the medial " joint line. . No other focal or bony tenderness. No pain or apprehension with patellar manipulation. Normal quad tone.  Extensor intact. No instability or pain with valgus and varus testing.  Negative Lachman's.                Lymph: no appreciated lymphedema  GAIT: antalgic            Diagnostic Modalities:  None today.  Previous imaging reviewed.  Independent visualization of the images was performed.      Impression: left knee primary osteoarthritis    Plan:  All of the above pertinent physical exam and imaging modalities findings was reviewed with Elisa.  She got 10 months of very good relief after the last injection. Reasonable to repeat. Did discuss benefits of physical therapy again. She will call back if she would like this.     The patient was counseled about an  injection, including discussion of risks (including infection), contents of the injection, rationale for performing the injection, and expected benefits of the injection. The skin was prepped with alcohol and betadine and then utilizing sterile technique an injection of the left knee joint from the anterolateral approach in the seated position was performed. The injection consisted 1ml of Kenalog (40mg per 1 ml) mixed with 3ml of 0.5% Marcaine. The patient tolerated the injection well, and there were no complications. The injection site was covered with a Band-Aid. The injection was performed by FLORENCIA Grant, CNP, DNP     If any concerns for infection seek immediate medical evaluation either in the clinic or the ED.      Return to clinic PRN, or sooner as needed for changes.  Re-x-ray on return: No       Scribed by:  FLORENCIA Grant, CNP  9:15 AM  8/3/2020  The information in this document, created by a scribe for me, accurately reflects the services I personally performed and the decisions made by me. I have reviewed and approved this document for accuracy    Sahil Cottrell, DO         Again, thank you for allowing me to participate in the  care of your patient.        Sincerely,        Sahil Cottrell, DO

## 2020-08-03 NOTE — PROGRESS NOTES
Elisa to follow up with Primary Care provider regarding elevated blood pressure.  Clinic Administered Medication Documentation      Injection Documentation     Injection was administered by provider (please see MAR for given by information). Please see MAR and medication order for additional information.     Site: Joint injection   Medication Used: Kenalog 40mg   Expiration Date:  3/2022  The following medication was given by Chas Gaviria, APRN, CNP, DNP:     MEDICATION: Kenalog 40mg/1ml  ROUTE: Joint Injection  SITE: left knee  DOSE: 1 mL  LOT #: [id number]  : [employer]  EXPIRATION DATE:  3/2022  NDC: [address]-7                        Cait

## 2020-08-03 NOTE — PROGRESS NOTES
Office Visit-Follow up    Chief Complaint: Elisa Al is a 54 year old female who is being seen for   Chief Complaint   Patient presents with     RECHECK     left knee primary osteaorthritis       History of Present Illness:   Today's visit  Returns to clinic. States got a good 10 months of total relief from the injection. Worked very well.  Would like this repeated today. The pain returned about 1 month ago. Same pain as previous only more intense. No new injuries.  Unable to take NSAIDs due to allergy.   9/4/19 visit  Elisa Al is a 54 year old female who is seen in consultation at the request of ADAM Mojica for evaluation of left knee pain.  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  Since then she has had continued anterior, lateral, and medial knee pain. Non-radiating.  Constant aching.  Mild to moderate depending on pain.  Worst with bending, kneeling, putting pressure on the knee, twisting, working on farm. Better with tylenol, icing, 's compression sleeve.    Treatments tried:  tylenol, icing, 's compression sleeve., rest, elevation, activity modification. Cannot take NSAIDs due to allergy.   Also notes previous issues with spine, but denies any current radicular symptoms states this feels different than previous back issues. Denies diabetes hx. States no previous problems with previous back steroid injections.   Social History     Occupational History     Employer: [employer]   Tobacco Use     Smoking status: Current Every Day Smoker     Packs/day: 0.25     Years: 23.00     Pack years: 5.75     Smokeless tobacco: Former User     Quit date: 1/19/2013   Substance and Sexual Activity     Alcohol use: Yes     Alcohol/week: 0.0 standard drinks     Comment: nightly     Drug use: No     Sexual activity: Yes     Partners: Male       REVIEW OF SYSTEMS  General: negative for,  "night sweats, dizziness, fatigue  Resp: No shortness of breath and no cough  CV: negative for chest pain, syncope or near-syncope  GI: negative for nausea, vomiting and diarrhea  : negative for dysuria and hematuria  Musculoskeletal: as above  Neurologic: negative for syncope   Hematologic: negative for bleeding disorder    Physical Exam:  Vitals: BP (!) 140/70   Ht 1.549 m (5' 1\")   Wt 67.6 kg (149 lb)   LMP 01/13/2004   BMI 28.15 kg/m    BMI= Body mass index is 28.15 kg/m .  Constitutional: healthy, alert and no acute distress   Psychiatric: mentation appears normal and affect normal/bright  NEURO: no focal deficits  RESP: Normal with easy respirations and no use of accessory muscles to breathe, no audible wheezing or retractions  CV: LLE:  no edema         Regular rate and rhythm by palpation  SKIN: No erythema, rashes, excoriation, or breakdown. No evidence of infection.   JOINT/EXTREMITIES:left knee: moderate effusion. Full motion without pain some crepitus.  Tender mostly along the medial joint line. . No other focal or bony tenderness. No pain or apprehension with patellar manipulation. Normal quad tone.  Extensor intact. No instability or pain with valgus and varus testing.  Negative Lachman's.                Lymph: no appreciated lymphedema  GAIT: antalgic            Diagnostic Modalities:  None today.  Previous imaging reviewed.  Independent visualization of the images was performed.      Impression: left knee primary osteoarthritis    Plan:  All of the above pertinent physical exam and imaging modalities findings was reviewed with Elisa.  She got 10 months of very good relief after the last injection. Reasonable to repeat. Did discuss benefits of physical therapy again. She will call back if she would like this.     The patient was counseled about an  injection, including discussion of risks (including infection), contents of the injection, rationale for performing the injection, and expected " benefits of the injection. The skin was prepped with alcohol and betadine and then utilizing sterile technique an injection of the left knee joint from the anterolateral approach in the seated position was performed. The injection consisted 1ml of Kenalog (40mg per 1 ml) mixed with 3ml of 0.5% Marcaine. The patient tolerated the injection well, and there were no complications. The injection site was covered with a Band-Aid. The injection was performed by FLORENCIA Grant, CNP, CYNDEE     If any concerns for infection seek immediate medical evaluation either in the clinic or the ED.      Return to clinic PRN, or sooner as needed for changes.  Re-x-ray on return: No       Scribed by:  FLORENCIA Grant CNP  9:15 AM  8/3/2020  The information in this document, created by a scribe for me, accurately reflects the services I personally performed and the decisions made by me. I have reviewed and approved this document for accuracy    Sahil Cottrell DO

## 2021-03-08 DIAGNOSIS — E03.9 HYPOTHYROIDISM, UNSPECIFIED TYPE: ICD-10-CM

## 2021-03-08 NOTE — LETTER
[address] [address]  Phone: [phone]  Fax: [phone]        March 10, 2021      Elisa TASH Servando                                                                                                                                [address] [facility] [address]            Dear Ms. Al,    We are concerned about your health care.  We recently provided you with a medication refill.  Many medications require routine follow-up with your Doctor.      At this time we ask that: You schedule an appointment for your annual physical. Please call the clinic at [phone] option 1 to schedule.     Your prescription: Has been refilled for 3 month so you may have time for the above noted follow-up.      Thank you,      Reza Olson NP  Care Team

## 2021-03-10 RX ORDER — LEVOTHYROXINE SODIUM 100 UG/1
100 TABLET ORAL DAILY
Qty: 90 TABLET | Refills: 0 | Status: SHIPPED | OUTPATIENT
Start: 2021-03-10 | End: 2021-06-09

## 2021-03-10 NOTE — TELEPHONE ENCOUNTER
Called and LM for patient to call back. Please help schedule physical appointment before medications runs out.   Emilee Benson MA

## 2021-03-10 NOTE — TELEPHONE ENCOUNTER
Routing to team to set up F2F appointment for patient for yearly.  Patient has been given #90 to get to appointment per triage protocol.  PATRICIA WeberN, RN

## 2021-04-27 DIAGNOSIS — F41.8 DEPRESSION WITH ANXIETY: ICD-10-CM

## 2021-04-27 RX ORDER — SERTRALINE HYDROCHLORIDE 100 MG/1
TABLET, FILM COATED ORAL
Qty: 90 TABLET | Refills: 3 | Status: SHIPPED | OUTPATIENT
Start: 2021-04-27 | End: 2022-04-28

## 2021-04-27 NOTE — TELEPHONE ENCOUNTER
ZOLOFT 100 mg  Last Written Prescription Date:  2/21/20  Last Fill Quantity: 90,  # refills: 3   Last office visit: 4/13/2020 with prescribing provider:  Reza Olson   Future Office Visit:   Next 5 appointments (look out 90 days)    May 24, 2021  7:00 AM  PHYSICAL with Reza Olson NP  [facility] ([facility] ) [address] [place] [address]  [phone]   May 24, 2021  8:15 AM  Return Visit with Sahil Cottrell DO  [facility] ([facility] ) [address] [place] [address]  [phone]        ZOLOFT 50 mg  Last Written Prescription Date:  2/21/20  Last Fill Quantity: 90,  # refills: 3   Last office visit: 4/13/2020 with prescribing provider:  Reza Olson   Future Office Visit:   Next 5 appointments (look out 90 days)    May 24, 2021  7:00 AM  PHYSICAL with Reza Olson NP  [facility] ([facility] ) [address] [place] [address]  [phone]   May 24, 2021  8:15 AM  Return Visit with Sahil Cottrell DO  [facility] ([facility] ) [address] [place] [address]  [phone]       Routing refill request to provider for review/approval because:  A break in medication  NO PHQ9 since 2/6/20  VERNA Romero

## 2021-05-24 ENCOUNTER — ANCILLARY PROCEDURE (OUTPATIENT)
Dept: GENERAL RADIOLOGY | Facility: CLINIC | Age: 56
End: 2021-05-24
Attending: ORTHOPAEDIC SURGERY
Payer: COMMERCIAL

## 2021-05-24 ENCOUNTER — OFFICE VISIT (OUTPATIENT)
Dept: ORTHOPEDICS | Facility: CLINIC | Age: 56
End: 2021-05-24
Payer: COMMERCIAL

## 2021-05-24 VITALS
HEIGHT: 60 IN | WEIGHT: 150 LBS | BODY MASS INDEX: 29.45 KG/M2 | SYSTOLIC BLOOD PRESSURE: 140 MMHG | DIASTOLIC BLOOD PRESSURE: 100 MMHG

## 2021-05-24 DIAGNOSIS — M17.12 PRIMARY OSTEOARTHRITIS OF LEFT KNEE: ICD-10-CM

## 2021-05-24 DIAGNOSIS — M17.12 PRIMARY OSTEOARTHRITIS OF LEFT KNEE: Primary | ICD-10-CM

## 2021-05-24 PROCEDURE — 20610 DRAIN/INJ JOINT/BURSA W/O US: CPT | Mod: LT | Performed by: ORTHOPAEDIC SURGERY

## 2021-05-24 PROCEDURE — 99213 OFFICE O/P EST LOW 20 MIN: CPT | Mod: 25 | Performed by: ORTHOPAEDIC SURGERY

## 2021-05-24 PROCEDURE — 73564 X-RAY EXAM KNEE 4 OR MORE: CPT | Mod: TC | Performed by: RADIOLOGY

## 2021-05-24 RX ORDER — TRIAMCINOLONE ACETONIDE 40 MG/ML
40 INJECTION, SUSPENSION INTRA-ARTICULAR; INTRAMUSCULAR ONCE
Status: COMPLETED | OUTPATIENT
Start: 2021-05-24 | End: 2021-05-24

## 2021-05-24 RX ADMIN — TRIAMCINOLONE ACETONIDE 40 MG: 40 INJECTION, SUSPENSION INTRA-ARTICULAR; INTRAMUSCULAR at 08:02

## 2021-05-24 ASSESSMENT — MIFFLIN-ST. JEOR: SCORE: 1196.9

## 2021-05-24 ASSESSMENT — PAIN SCALES - GENERAL: PAINLEVEL: SEVERE PAIN (7)

## 2021-05-24 NOTE — PROGRESS NOTES
Office Visit-Follow up    Chief Complaint: Elisa Al is a 55 year old female who is being seen for   Chief Complaint   Patient presents with     RECHECK      left knee primary osteoarthritis       History of Present Illness:     Today's visit  Returns to clinic. States the injection lasted about 4-5 months. States did do well during that time. However, overall the knee is getting worse. Feels like it could give out and fears of falling.  Increasing swelling. Pain is same as previous just more intense. No new injuries.  Thinking knee replacement in the fall.   8/3/20 visit  Returns to clinic. States got a good 10 months of total relief from the injection. Worked very well.  Would like this repeated today. The pain returned about 1 month ago. Same pain as previous only more intense. No new injuries.  Unable to take NSAIDs due to allergy.   9/4/19 visit  Elisa Al is a 54 year old female who is seen in consultation at the request of ADAM Mojica for evaluation of left knee pain.  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  Since then she has had continued anterior, lateral, and medial knee pain. Non-radiating.  Constant aching.  Mild to moderate depending on pain.  Worst with bending, kneeling, putting pressure on the knee, twisting, working on farm. Better with tylenol, icing, 's compression sleeve.    Treatments tried:  tylenol, icing, 's compression sleeve., rest, elevation, activity modification. Cannot take NSAIDs due to allergy.   Also notes previous issues with spine, but denies any current radicular symptoms states this feels different than previous back issues. Denies diabetes hx. States no previous problems with previous back steroid injections.       Social History     Occupational History     Employer: [employer]   Tobacco Use     Smoking status: Current Every Day Smoker      Packs/day: 0.25     Years: 23.00     Pack years: 5.75     Smokeless tobacco: Former User     Quit date: 1/19/2013   Substance and Sexual Activity     Alcohol use: Yes     Alcohol/week: 0.0 standard drinks     Comment: nightly     Drug use: No     Sexual activity: Yes     Partners: Male       REVIEW OF SYSTEMS  General: negative for, night sweats, dizziness, fatigue  Resp: No shortness of breath and no cough  CV: negative for chest pain, syncope or near-syncope  GI: negative for nausea, vomiting and diarrhea  : negative for dysuria and hematuria  Musculoskeletal: as above  Neurologic: negative for syncope   Hematologic: negative for bleeding disorder    Physical Exam:  Vitals: BP (!) 140/100   Ht 1.524 m (5')   Wt 68 kg (150 lb)   LMP 01/13/2004   BMI 29.29 kg/m    BMI= Body mass index is 29.29 kg/m .  Constitutional: healthy, alert and no acute distress   Psychiatric: mentation appears normal and affect normal/bright  NEURO: no focal deficits  RESP: Normal with easy respirations and no use of accessory muscles to breathe, no audible wheezing or retractions  CV: LLE:  no edema         Regular rate and rhythm by palpation  SKIN: No erythema, rashes, excoriation, or breakdown. No evidence of infection.   JOINT/EXTREMITIES:left knee: moderate effusion. 2-120, PROM 0-125 some pain at maximum flexion and extension and some crepitus.  Tender mostly along the medial joint line. . No other focal or bony tenderness. No pain or apprehension with patellar manipulation. Normal quad tone.  Extensor intact. No instability or pain with valgus and varus testing.  Negative Lachman's.                Lymph: no appreciated lymphedema  GAIT: antalgic          Diagnostic Modalities:  left knee X-ray: No fracture, dislocation and or lesion. Varus alignment.  Near bone on bone medial space narrowing with osteophyte formation. Subtle progression since last xray. Mild lateral and patellofemoral compartment joint space loss with  osteophyte formation. No appreciable soft tissue abnormality  Independent visualization of the images was performed.      Impression: left knee primary osteoarthritis     Plan:  All of the above pertinent physical exam and imaging modalities findings was reviewed with Elisa.  She would like a repeat injection as she is still getting benefit from them but she is thinking about knee replacement this fall. Understands 3 months prior to knee replacement after injection.  Did discuss therapy. She will call if she would like this. Reports allergy to NSAIDs. Taking tylenol.     The patient was counseled about an  injection, including discussion of risks (including infection), contents of the injection, rationale for performing the injection, and expected benefits of the injection. The skin was prepped with alcohol and betadine and then utilizing sterile technique an injection of the left knee joint from the anterolateral approach in the seated position was performed. The injection consisted 1ml of Kenalog (40mg per 1 ml) mixed with 3ml of 0.5% Marcaine. The patient tolerated the injection well, and there were no complications. The injection site was covered with a Band-Aid. The injection was performed by FLORENCIA Grant, CNP, DNP      If any concerns for infection seek immediate medical evaluation either in the clinic or the ED.         Return to clinic either 4-6 weeks prior to when she would like knee replacement or PRN for injections, or sooner as needed for changes.  Re-x-ray on return: No    Scribed by:  FLORENCIA Grant CNP  7:34 AM  5/24/2021  The information in this document, created by a scribe for me, accurately reflects the services I personally performed and the decisions made by me. I have reviewed and approved this document for accuracy    Sahil Cottrell DO

## 2021-05-24 NOTE — PROGRESS NOTES
Elisa to follow up with Primary Care provider regarding elevated blood pressure.  Clinic Administered Medication Documentation      Injection Documentation     Injection was administered by provider (please see MAR for given by information). Please see MAR and medication order for additional information.     Site: Joint injection   Medication Used: Kenalog 40mg   Expiration Date:  11/2022      The following medication was given by Chas Gaviria, APRN, CNP, DNP:     MEDICATION: Kenalog 40mg/1ml  ROUTE: Joint Injection  SITE: LEFT KNEE  DOSE: 1 mL  LOT #: [id number]  : [employer]   EXPIRATION DATE:  11/2022  NDC: [address]-0                Cait

## 2021-05-24 NOTE — LETTER
5/24/2021         RE: Elisa Al  [id number] [facility] [id number]        Dear Colleague,    Thank you for referring your patient, Elisa Al, to the [facility]. Please see a copy of my visit note below.    Office Visit-Follow up    Chief Complaint: Elisa Al is a 55 year old female who is being seen for   Chief Complaint   Patient presents with     RECHECK      left knee primary osteoarthritis       History of Present Illness:     Today's visit  Returns to clinic. States the injection lasted about 4-5 months. States did do well during that time. However, overall the knee is getting worse. Feels like it could give out and fears of falling.  Increasing swelling. Pain is same as previous just more intense. No new injuries.  Thinking knee replacement in the fall.   8/3/20 visit  Returns to clinic. States got a good 10 months of total relief from the injection. Worked very well.  Would like this repeated today. The pain returned about 1 month ago. Same pain as previous only more intense. No new injuries.  Unable to take NSAIDs due to allergy.   9/4/19 visit  Elisa Al is a 54 year old female who is seen in consultation at the request of ADAM Mojica for evaluation of left knee pain.  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  Since then she has had continued anterior, lateral, and medial knee pain. Non-radiating.  Constant aching.  Mild to moderate depending on pain.  Worst with bending, kneeling, putting pressure on the knee, twisting, working on farm. Better with tylenol, icing, 's compression sleeve.    Treatments tried:  tylenol, icing, 's compression sleeve., rest, elevation, activity modification. Cannot take NSAIDs due to allergy.   Also notes previous issues with spine, but denies any current radicular symptoms states this feels  different than previous back issues. Denies diabetes hx. States no previous problems with previous back steroid injections.       Social History     Occupational History     Employer: [employer]   Tobacco Use     Smoking status: Current Every Day Smoker     Packs/day: 0.25     Years: 23.00     Pack years: 5.75     Smokeless tobacco: Former User     Quit date: 1/19/2013   Substance and Sexual Activity     Alcohol use: Yes     Alcohol/week: 0.0 standard drinks     Comment: nightly     Drug use: No     Sexual activity: Yes     Partners: Male       REVIEW OF SYSTEMS  General: negative for, night sweats, dizziness, fatigue  Resp: No shortness of breath and no cough  CV: negative for chest pain, syncope or near-syncope  GI: negative for nausea, vomiting and diarrhea  : negative for dysuria and hematuria  Musculoskeletal: as above  Neurologic: negative for syncope   Hematologic: negative for bleeding disorder    Physical Exam:  Vitals: BP (!) 140/100   Ht 1.524 m (5')   Wt 68 kg (150 lb)   LMP 01/13/2004   BMI 29.29 kg/m    BMI= Body mass index is 29.29 kg/m .  Constitutional: healthy, alert and no acute distress   Psychiatric: mentation appears normal and affect normal/bright  NEURO: no focal deficits  RESP: Normal with easy respirations and no use of accessory muscles to breathe, no audible wheezing or retractions  CV: LLE:  no edema         Regular rate and rhythm by palpation  SKIN: No erythema, rashes, excoriation, or breakdown. No evidence of infection.   JOINT/EXTREMITIES:left knee: moderate effusion. 2-120, PROM 0-125 some pain at maximum flexion and extension and some crepitus.  Tender mostly along the medial joint line. . No other focal or bony tenderness. No pain or apprehension with patellar manipulation. Normal quad tone.  Extensor intact. No instability or pain with valgus and varus testing.  Negative Lachman's.                Lymph: no appreciated lymphedema  GAIT: antalgic          Diagnostic  Modalities:  left knee X-ray: No fracture, dislocation and or lesion. Varus alignment.  Near bone on bone medial space narrowing with osteophyte formation. Subtle progression since last xray. Mild lateral and patellofemoral compartment joint space loss with osteophyte formation. No appreciable soft tissue abnormality  Independent visualization of the images was performed.      Impression: left knee primary osteoarthritis     Plan:  All of the above pertinent physical exam and imaging modalities findings was reviewed with Elisa.  She would like a repeat injection as she is still getting benefit from them but she is thinking about knee replacement this fall. Understands 3 months prior to knee replacement after injection.  Did discuss therapy. She will call if she would like this. Reports allergy to NSAIDs. Taking tylenol.     The patient was counseled about an  injection, including discussion of risks (including infection), contents of the injection, rationale for performing the injection, and expected benefits of the injection. The skin was prepped with alcohol and betadine and then utilizing sterile technique an injection of the left knee joint from the anterolateral approach in the seated position was performed. The injection consisted 1ml of Kenalog (40mg per 1 ml) mixed with 3ml of 0.5% Marcaine. The patient tolerated the injection well, and there were no complications. The injection site was covered with a Band-Aid. The injection was performed by FLORENCIA Grant, CNP, DNP      If any concerns for infection seek immediate medical evaluation either in the clinic or the ED.         Return to clinic either 4-6 weeks prior to when she would like knee replacement or PRN for injections, or sooner as needed for changes.  Re-x-ray on return: No    Scribed by:  FLORENCIA Grant CNP  7:34 AM  5/24/2021  The information in this document, created by a scribe for me, accurately reflects the services I personally  performed and the decisions made by me. I have reviewed and approved this document for accuracy    DO Elisa Fermin to follow up with Primary Care provider regarding elevated blood pressure.  Clinic Administered Medication Documentation      Injection Documentation     Injection was administered by provider (please see MAR for given by information). Please see MAR and medication order for additional information.     Site: Joint injection   Medication Used: Kenalog 40mg   Expiration Date:  11/2022      The following medication was given by Chas Gaviria, APRN, CNP, DNP:     MEDICATION: Kenalog 40mg/1ml  ROUTE: Joint Injection  SITE: LEFT KNEE  DOSE: 1 mL  LOT #: [id number]  : [employer]   EXPIRATION DATE:  11/2022  NDC: [address]-2                Pippa/OSKAR       Again, thank you for allowing me to participate in the care of your patient.        Sincerely,        Sahil Cottrell DO

## 2021-06-09 DIAGNOSIS — E03.9 HYPOTHYROIDISM, UNSPECIFIED TYPE: ICD-10-CM

## 2021-06-09 RX ORDER — LEVOTHYROXINE SODIUM 100 UG/1
100 TABLET ORAL DAILY
Qty: 30 TABLET | Refills: 0 | Status: SHIPPED | OUTPATIENT
Start: 2021-06-09 | End: 2021-06-28

## 2021-06-09 NOTE — TELEPHONE ENCOUNTER
Routing to team to set up F2F  appointment for patient for yearly.    Routing refill request to provider for review/approval because:  Gabi given x1 and patient did not follow up, please advise  Labs not current:  TSH  Last Written Prescription Date:  3/10/21  Last Fill Quantity: 90,  # refills: 0   Last office visit: 4/13/2020 with prescribing provider:     Future Office Visit:      PATRICIA WeberN, RN

## 2021-06-09 NOTE — TELEPHONE ENCOUNTER
Called and LM for patient to call back. Please relay note below and help schedule appointment.   Emilee Benson MA

## 2021-06-28 ENCOUNTER — OFFICE VISIT (OUTPATIENT)
Dept: FAMILY MEDICINE | Facility: CLINIC | Age: 56
End: 2021-06-28
Payer: COMMERCIAL

## 2021-06-28 VITALS
BODY MASS INDEX: 28.8 KG/M2 | SYSTOLIC BLOOD PRESSURE: 130 MMHG | OXYGEN SATURATION: 99 % | DIASTOLIC BLOOD PRESSURE: 88 MMHG | HEIGHT: 61 IN | HEART RATE: 103 BPM | TEMPERATURE: 97.2 F | RESPIRATION RATE: 18 BRPM | WEIGHT: 152.56 LBS

## 2021-06-28 DIAGNOSIS — Z12.11 SCREEN FOR COLON CANCER: ICD-10-CM

## 2021-06-28 DIAGNOSIS — Z00.00 HEALTHCARE MAINTENANCE: ICD-10-CM

## 2021-06-28 DIAGNOSIS — E03.9 HYPOTHYROIDISM, UNSPECIFIED TYPE: ICD-10-CM

## 2021-06-28 DIAGNOSIS — Z00.00 ROUTINE GENERAL MEDICAL EXAMINATION AT A HEALTH CARE FACILITY: ICD-10-CM

## 2021-06-28 DIAGNOSIS — Z12.31 ENCOUNTER FOR SCREENING MAMMOGRAM FOR BREAST CANCER: ICD-10-CM

## 2021-06-28 DIAGNOSIS — Z23 NEED FOR VACCINATION: Primary | ICD-10-CM

## 2021-06-28 LAB
ANION GAP SERPL CALCULATED.3IONS-SCNC: 4 MMOL/L (ref 3–14)
BUN SERPL-MCNC: 13 MG/DL (ref 7–30)
CALCIUM SERPL-MCNC: 8.8 MG/DL (ref 8.5–10.1)
CHLORIDE SERPL-SCNC: 107 MMOL/L (ref 94–109)
CHOLEST SERPL-MCNC: 339 MG/DL
CO2 SERPL-SCNC: 28 MMOL/L (ref 20–32)
CREAT SERPL-MCNC: 0.7 MG/DL (ref 0.52–1.04)
GFR SERPL CREATININE-BSD FRML MDRD: >90 ML/MIN/{1.73_M2}
GLUCOSE SERPL-MCNC: 95 MG/DL (ref 70–99)
HDLC SERPL-MCNC: 86 MG/DL
LDLC SERPL CALC-MCNC: 187 MG/DL
NONHDLC SERPL-MCNC: 253 MG/DL
POTASSIUM SERPL-SCNC: 3.9 MMOL/L (ref 3.4–5.3)
SODIUM SERPL-SCNC: 139 MMOL/L (ref 133–144)
TRIGL SERPL-MCNC: 331 MG/DL
TSH SERPL DL<=0.005 MIU/L-ACNC: 1.7 MU/L (ref 0.4–4)

## 2021-06-28 PROCEDURE — 36415 COLL VENOUS BLD VENIPUNCTURE: CPT | Performed by: NURSE PRACTITIONER

## 2021-06-28 PROCEDURE — 80048 BASIC METABOLIC PNL TOTAL CA: CPT | Performed by: NURSE PRACTITIONER

## 2021-06-28 PROCEDURE — 90750 HZV VACC RECOMBINANT IM: CPT | Performed by: NURSE PRACTITIONER

## 2021-06-28 PROCEDURE — 84443 ASSAY THYROID STIM HORMONE: CPT | Performed by: NURSE PRACTITIONER

## 2021-06-28 PROCEDURE — 80061 LIPID PANEL: CPT | Performed by: NURSE PRACTITIONER

## 2021-06-28 PROCEDURE — 90471 IMMUNIZATION ADMIN: CPT | Performed by: NURSE PRACTITIONER

## 2021-06-28 PROCEDURE — 99396 PREV VISIT EST AGE 40-64: CPT | Mod: 25 | Performed by: NURSE PRACTITIONER

## 2021-06-28 RX ORDER — LEVOTHYROXINE SODIUM 100 UG/1
100 TABLET ORAL DAILY
Qty: 90 TABLET | Refills: 3 | Status: SHIPPED | OUTPATIENT
Start: 2021-06-28 | End: 2022-06-21

## 2021-06-28 ASSESSMENT — ENCOUNTER SYMPTOMS
PALPITATIONS: 0
NERVOUS/ANXIOUS: 0
HEARTBURN: 0
CHILLS: 0
HEMATURIA: 0
SHORTNESS OF BREATH: 1
DIARRHEA: 1
HEMATOCHEZIA: 0
HEADACHES: 1
COUGH: 1
SORE THROAT: 1
WEAKNESS: 0
PARESTHESIAS: 0
DYSURIA: 0
MYALGIAS: 1
FREQUENCY: 0
EYE PAIN: 0
ARTHRALGIAS: 1
BREAST MASS: 0
CONSTIPATION: 0
JOINT SWELLING: 1
ABDOMINAL PAIN: 0
NAUSEA: 0
DIZZINESS: 0
FEVER: 0

## 2021-06-28 ASSESSMENT — PAIN SCALES - GENERAL: PAINLEVEL: NO PAIN (0)

## 2021-06-28 ASSESSMENT — PATIENT HEALTH QUESTIONNAIRE - PHQ9: SUM OF ALL RESPONSES TO PHQ QUESTIONS 1-9: 5

## 2021-06-28 ASSESSMENT — MIFFLIN-ST. JEOR: SCORE: 1227.57

## 2021-06-28 NOTE — PROGRESS NOTES
SUBJECTIVE:   CC: Elisa Al is an 55 year old woman who presents for preventive health visit.       Patient has been advised of split billing requirements and indicates understanding: Yes  Healthy Habits:     Getting at least 3 servings of Calcium per day:  Yes    Bi-annual eye exam:  Yes    Dental care twice a year:  NO    Sleep apnea or symptoms of sleep apnea:  None    Diet:  Regular (no restrictions)    Frequency of exercise:  6-7 days/week    Duration of exercise:  Greater than 60 minutes    Taking medications regularly:  Yes    Medication side effects:  None    PHQ-2 Total Score: 0    Additional concerns today:  No      Today's PHQ-2 Score:   PHQ-2 ( 1999 Pfizer) 6/28/2021   Q1: Little interest or pleasure in doing things 0   Q2: Feeling down, depressed or hopeless 0   PHQ-2 Score 0   Q1: Little interest or pleasure in doing things Not at all   Q2: Feeling down, depressed or hopeless Not at all   PHQ-2 Score 0     Prior to immunization administration, verified patients identity using patient s name and date of birth. Please see Immunization Activity for additional information.     Screening Questionnaire for Adult Immunization    Are you sick today?   No   Do you have allergies to medications, food, a vaccine component or latex?   Yes   Have you ever had a serious reaction after receiving a vaccination?   No   Do you have a long-term health problem with heart, lung, kidney, or metabolic disease (e.g., diabetes), asthma, a blood disorder, no spleen, complement component deficiency, a cochlear implant, or a spinal fluid leak?  Are you on long-term aspirin therapy?   No   Do you have cancer, leukemia, HIV/AIDS, or any other immune system problem?   No   Do you have a parent, brother, or sister with an immune system problem?   Yes- luekemia   In the past 3 months, have you taken medications that affect  your immune system, such as prednisone, other steroids, or anticancer drugs; drugs for the treatment of  rheumatoid arthritis, Crohn s disease, or psoriasis; or have you had radiation treatments?   No   Have you had a seizure, or a brain or other nervous system problem?   No   During the past year, have you received a transfusion of blood or blood    products, or been given immune (gamma) globulin or antiviral drug?   No   For women: Are you pregnant or is there a chance you could become       pregnant during the next month?   No   Have you received any vaccinations in the past 4 weeks?   No     Immunization questionnaire was positive for at least one answer.  Notified Reza Olson.        Per orders of Reza Olson, injection of Shingrix given by Emilee Benson CMA. Patient instructed to remain in clinic for 15 minutes afterwards, and to report any adverse reaction to me immediately.       Screening performed by Emilee Benson CMA on 6/28/2021 at 7:11 AM.      Abuse: Current or Past (Physical, Sexual or Emotional) - No  Do you feel safe in your environment? Yes    Have you ever done Advance Care Planning? (For example, a Health Directive, POLST, or a discussion with a medical provider or your loved ones about your wishes): No, advance care planning information given to patient to review.  Patient declined advance care planning discussion at this time.    Social History     Tobacco Use     Smoking status: Current Every Day Smoker     Packs/day: 0.25     Years: 23.00     Pack years: 5.75     Smokeless tobacco: Former User     Quit date: 1/19/2013   Substance Use Topics     Alcohol use: Yes     Alcohol/week: 0.0 standard drinks     Comment: nightly         Alcohol Use 6/28/2021   Prescreen: >3 drinks/day or >7 drinks/week? Yes   Prescreen: >3 drinks/day or >7 drinks/week? -   AUDIT SCORE  8     AUDIT - Alcohol Use Disorders Identification Test - Reproduced from the World Health Organization Audit 2001 (Second Edition) 6/28/2021   1.  How often do you have a drink containing alcohol? 4 or more times a week    2.  How many drinks containing alcohol do you have on a typical day when you are drinking? 3 or 4   3.  How often do you have five or more drinks on one occasion? Weekly   4.  How often during the last year have you found that you were not able to stop drinking once you had started? Never   5.  How often during the last year have you failed to do what was normally expected of you because of drinking? Never   6.  How often during the last year have you needed a first drink in the morning to get yourself going after a heavy drinking session? Never   7.  How often during the last year have you had a feeling of guilt or remorse after drinking? Never   8.  How often during the last year have you been unable to remember what happened the night before because of your drinking? Never   9.  Have you or someone else been injured because of your drinking? No   10. Has a relative, friend, doctor or other health care worker been concerned about your drinking or suggested you cut down? No   TOTAL SCORE 8       Reviewed orders with patient.  Reviewed health maintenance and updated orders accordingly - Yes  Lab work is in process  Labs reviewed in EPIC  BP Readings from Last 3 Encounters:   06/28/21 130/88   05/24/21 (!) 140/100   08/03/20 (!) 140/70    Wt Readings from Last 3 Encounters:   06/28/21 69.2 kg (152 lb 9 oz)   05/24/21 68 kg (150 lb)   08/03/20 67.6 kg (149 lb)                  Patient Active Problem List   Diagnosis     Pain in joint, shoulder region     CARDIOVASCULAR SCREENING; LDL GOAL LESS THAN 160     Hypothyroidism, unspecified type     Major depression in complete remission (H)     Primary osteoarthritis of left knee     Peroneal tendonitis, left     Past Surgical History:   Procedure Laterality Date     C LAP,ABD/PERIT/OMENTUM,UNLIST  04/12/10    pelvic adhesion     C TOTAL ABDOM HYSTERECTOMY  09/08/2004    TAHRSAMEERA, Cystoscopy.     CARPAL TUNNEL RELEASE RT/LT  2005    right wrist     EXPLORE SPINE, REMOVE  HARDWARE, COMBINED  4/7/2014    Procedure: COMBINED EXPLORE SPINE, REMOVE HARDWARE;  Hardware Removal Lumbar 4-Sacral 1, Exploration of Fusion;  Surgeon: Brenton Ashford MD;  Location: [facility]     FUSION LUMBAR ANTERIOR THREE+ LEVELS       HC COLONOSCOPY W BIOPSY  04/26/10     HC COLONOSCOPY W/WO BRUSH/WASH  5/25/2005     HC EXCISION TENDON SHEATH LESION, HAND/FINGR  04/17/2006    Right middle finger flexor sheath MP joint.     HC EXCISION TENDON SHEATH LESION, HAND/FINGR  08/07/06    Right middle finger     HC LAPAROSCOPY, SURGICAL; CHOLECYSTECTOMY  4/12/2004    Cholecystectomy, Laparoscopic     HC REMOVAL OF OVARY/TUBE(S)      left ovary removed secondary to cyst     HC REMOVAL OF TONSILS,12+ Y/O      Tonsils 12+y.o.     HC REVISE MEDIAN N/CARPAL TUNNEL SURG  8/22/2005    Left     [facility] UGI ENDOSCOPY, SIMPLE EXAM  5/25/2005       Social History     Tobacco Use     Smoking status: Current Every Day Smoker     Packs/day: 0.25     Years: 23.00     Pack years: 5.75     Smokeless tobacco: Former User     Quit date: 1/19/2013   Substance Use Topics     Alcohol use: Yes     Alcohol/week: 0.0 standard drinks     Comment: nightly     Family History   Problem Relation Age of Onset     Blood Disease Mother         leukemia-passed 2008     Cardiovascular Father         triple bypass     Respiratory Father         Black Lung     Alzheimer Disease Father      Cancer Maternal Grandmother         ovarian/brain ca         Current Outpatient Medications   Medication Sig Dispense Refill     levothyroxine (SYNTHROID/LEVOTHROID) 100 MCG tablet Take 1 tablet (100 mcg) by mouth daily Appointment needed for additional refills. 90 tablet 3     sertraline (ZOLOFT) 100 MG tablet TAKE ONE TABLET BY MOUTH ONCE DAILY 90 tablet 3     sertraline (ZOLOFT) 50 MG tablet TAKE ONE TABLET BY MOUTH ONCE DAILY 90 tablet 3     TYLENOL EXTRA STRENGTH 500 MG OR TABS 2 tablets daily PRN 60 0     loratadine (CLARITIN) 10 MG tablet Take 1 tablet (10 mg) by  mouth daily (Patient not taking: Reported on 6/28/2021) 14 tablet 0     Allergies   Allergen Reactions     Aspirin Swelling     Erythromycin Ethylsuccinate GI Disturbance     gi upset     Ibuprofen Swelling     Naproxen      Nsaids Swelling     Zelnorm [Tegaserod Maleate]        Breast Cancer Screening:    Breast CA Risk Assessment (FHS-7) 6/28/2021   Do you have a family history of breast, colon, or ovarian cancer? No / Unknown         Mammogram Screening: Recommended mammography every 1-2 years with patient discussion and risk factor consideration  Pertinent mammograms are reviewed under the imaging tab.    History of abnormal Pap smear: Status post benign hysterectomy. Health Maintenance and Surgical History updated.     Reviewed and updated as needed this visit by clinical staff  Tobacco  Allergies  Meds   Med Hx  Surg Hx  Fam Hx  Soc Hx        Reviewed and updated as needed this visit by Provider                    Review of Systems   Constitutional: Negative for chills and fever.   HENT: Positive for sore throat. Negative for congestion, ear pain and hearing loss.    Eyes: Negative for pain and visual disturbance.   Respiratory: Positive for cough and shortness of breath.    Cardiovascular: Negative for chest pain, palpitations and peripheral edema.   Gastrointestinal: Positive for diarrhea. Negative for abdominal pain, constipation, heartburn, hematochezia and nausea.   Breasts:  Negative for tenderness, breast mass and discharge.   Genitourinary: Negative for dysuria, frequency, genital sores, hematuria, pelvic pain, urgency, vaginal bleeding and vaginal discharge.   Musculoskeletal: Positive for arthralgias, joint swelling and myalgias.   Skin: Negative for rash.   Neurological: Positive for headaches. Negative for dizziness, weakness and paresthesias.   Psychiatric/Behavioral: Negative for mood changes. The patient is not nervous/anxious.      Allergies have been bad, has barn cats, had to bring cat  "in the house now, she has sore throat and headaches related to this exposure. She is smoking, the humidity makes the cough worse along with the allergies. Diarrhea with fatty foods. Joint she needs knee replacement and she knows this. No new depression or anxiety, zoloft is working good for her.      OBJECTIVE:   /88   Pulse 103   Temp 97.2  F (36.2  C) (Temporal)   Resp 18   Ht 1.554 m (5' 1.2\")   Wt 69.2 kg (152 lb 9 oz)   LMP 01/13/2004   SpO2 99%   Breastfeeding No   BMI 28.64 kg/m    Physical Exam  GENERAL APPEARANCE: healthy, alert and no distress  EYES: Eyes grossly normal to inspection, PERRL and conjunctivae and sclerae normal  HENT: ear canals and TM's normal, nose and mouth without ulcers or lesions, oropharynx clear and oral mucous membranes moist  NECK: no adenopathy, no asymmetry, masses, or scars and thyroid normal to palpation  RESP: lungs clear to auscultation - no rales, rhonchi or wheezes  BREAST: normal without masses, tenderness or nipple discharge and no palpable axillary masses or adenopathy  CV: regular rate and rhythm, normal S1 S2, no S3 or S4, no murmur, click or rub, no peripheral edema and peripheral pulses strong  ABDOMEN: soft, nontender, no hepatosplenomegaly, no masses and bowel sounds normal  MS: no musculoskeletal defects are noted and gait is age appropriate without ataxia  SKIN: no suspicious lesions or rashes  NEURO: Normal strength and tone, sensory exam grossly normal, mentation intact and speech normal  PSYCH: mentation appears normal and affect normal/bright    Diagnostic Test Results:  Labs reviewed in Epic    ASSESSMENT/PLAN:       ICD-10-CM    1. Routine general medical examination at a health care facility  Z00.00 Lipid panel reflex to direct LDL Fasting     Basic metabolic panel   2. Healthcare maintenance  Z00.00 REVIEW OF HEALTH MAINTENANCE PROTOCOL ORDERS   3. Screen for colon cancer  Z12.11 GASTROENTEROLOGY ADULT REF PROCEDURE ONLY   4. " "Hypothyroidism, unspecified type  E03.9 TSH with free T4 reflex     levothyroxine (SYNTHROID/LEVOTHROID) 100 MCG tablet   5. Encounter for screening mammogram for breast cancer  Z12.31 *MA Screening Digital Bilateral       Patient has been advised of split billing requirements and indicates understanding: Yes  COUNSELING:  Reviewed preventive health counseling, as reflected in patient instructions       Regular exercise       Healthy diet/nutrition       Osteoporosis prevention/bone health       Colon cancer screening    Estimated body mass index is 28.64 kg/m  as calculated from the following:    Height as of this encounter: 1.554 m (5' 1.2\").    Weight as of this encounter: 69.2 kg (152 lb 9 oz).    Weight management plan: Discussed healthy diet and exercise guidelines    She reports that she has been smoking. She has a 5.75 pack-year smoking history. She quit smokeless tobacco use about 8 years ago.  Tobacco Cessation Action Plan:   Information offered: Patient not interested at this time      Counseling Resources:  ATP IV Guidelines  Pooled Cohorts Equation Calculator  Breast Cancer Risk Calculator  BRCA-Related Cancer Risk Assessment: FHS-7 Tool  FRAX Risk Assessment  ICSI Preventive Guidelines  Dietary Guidelines for Americans, 2010  USDA's MyPlate  ASA Prophylaxis  Lung CA Screening    Reza Olson NP  [facility]  "

## 2021-06-28 NOTE — PATIENT INSTRUCTIONS
Preventive Health Recommendations  Female Ages 50 - 64    Yearly exam: See your health care provider every year in order to  o Review health changes.   o Discuss preventive care.    o Review your medicines if your doctor has prescribed any.      Get a Pap test every three years (unless you have an abnormal result and your provider advises testing more often).    If you get Pap tests with HPV test, you only need to test every 5 years, unless you have an abnormal result.     You do not need a Pap test if your uterus was removed (hysterectomy) and you have not had cancer.    You should be tested each year for STDs (sexually transmitted diseases) if you're at risk.     Have a mammogram every 1 to 2 years.    Have a colonoscopy at age 50, or have a yearly FIT test (stool test). These exams screen for colon cancer.      Have a cholesterol test every 5 years, or more often if advised.    Have a diabetes test (fasting glucose) every three years. If you are at risk for diabetes, you should have this test more often.     If you are at risk for osteoporosis (brittle bone disease), think about having a bone density scan (DEXA).    Shots: Get a flu shot each year. Get a tetanus shot every 10 years.    Nutrition:     Eat at least 5 servings of fruits and vegetables each day.    Eat whole-grain bread, whole-wheat pasta and brown rice instead of white grains and rice.    Get adequate Calcium and Vitamin D.     Lifestyle    Exercise at least 150 minutes a week (30 minutes a day, 5 days a week). This will help you control your weight and prevent disease.    Limit alcohol to one drink per day.    No smoking.     Wear sunscreen to prevent skin cancer.     See your dentist every six months for an exam and cleaning.    See your eye doctor every 1 to 2 years.    Patient Education     Prevention Guidelines, Women Ages 50 to 64  Screening tests and vaccines are an important part of managing your health. A screening test is done to find  possible disorders or diseases in people who don't have any symptoms. The goal is to find a disease early so lifestyle changes can be made and you can be watched more closely to reduce the risk of disease, or to detect it early enough to treat it most effectively. Screening tests are not considered diagnostic, but are used to determine if more testing is needed. Health counseling is essential, too. Below are guidelines for these, for women ages 50 to 64. Keep in mind that screening advice varies among expert groups. Talk with your healthcare provider about which tests are best for you and to make sure you re up to date on what you need.   Screening  Who needs it  How often    Type 2 diabetes or prediabetes  All women beginning at age 45 and women without symptoms at any age who are overweight or obese and have 1 or more additional risk factors for diabetes.  At  least every 3 years    Type 2 diabetes or prediabetes  All women diagnosed with gestational diabetes  Lifelong testing at least every 3 years    Type 2 diabetes All women with prediabetes  Every year   Unhealthy alcohol use  All women in this age group  At routine exams   Blood pressure All women in this age group  Yearly checkup if your blood pressure is normal   Normal blood pressure is less than 120/80 mm Hg   If your blood pressure reading is higher than normal, follow the advice of your healthcare provider    Breast cancer All women at average risk in this age group  Yearly mammogram should be done until age 54. At age 55, you can switch to every other year or choose to continue yearly.   All women should know how their breasts normally look and feel and know the possible benefits and risks of breast cancer screening with mammograms.    Cervical cancer All women in this age group, except women who have had a complete hysterectomy  Pap test every 3 years or Pap test with human papillomavirus (HPV) test every 5 years    Chlamydia Women who are sexually  active and at increased risk for infection  At yearly routine exams    Colorectal cancer All women at average risk in this age group  Multiple tests are available and are used at different times. Possible tests include:     Flexible sigmoidoscopy every 5 years, or    Colonoscopy every 10 years, or    CT colonography (virtual colonoscopy) every 5 years, or    Yearly fecal occult blood test, or    Yearly fecal immunochemical test every year, or    Stool DNA test, every 3 years  If you choose a test other than a colonoscopy and have an abnormal test result, you will need to follow up with a colonoscopy. Screening advice varies among expert groups. Talk with your healthcare provider about which tests are best for you.   Some people should be screened using a different schedule because of their personal or family health history. Talk with your healthcare provider about your health history.    Depression All women in this age group  At routine exams   Gonorrhea Sexually active women at increased risk for infection  At yearly routine exams    Hepatitis C Anyone at increased risk; 1 time for those born between 1945 and 1965  At routine exams   High cholesterol or triglycerides  All women in this age group who are at risk for coronary artery disease  At least every 5 years; talk with your healthcare provider about your risk    HIV All women At least once during your lifetime; yearly if at high risk    Lung cancer Women between the ages of 55 to 74 who are in fairly good health and are at higher risk for lung cancer         Currently smoke or have  quit within past 15 years         30-pack-year smoking history  , Eligibility criteria and age limit (possibly up to age 80) may vary across major organizations  Yearly lung cancer screening with a low-dose CT scan (LDCT) Talk with your healthcare provider for more information.    Obesity All women in this age group  At yearly routine exams    Osteoporosis Women who are  postmenopausal  Talk with your healthcare provider    Syphilis Women at increased risk for infection  At routine exams; talk with your healthcare provider    Tuberculosis Women at increased risk for infection  Talk with your healthcare provider    Vision All women in this age group  Talk with your healthcare provider    Vaccine Who needs it How often   Chickenpox (varicella)  All women in this age group who have no record of this infection or vaccine  2 doses; the second dose should be given at least 4 weeks after the first dose    Hepatitis A Women at increased risk for infection  2 or 3 doses (depending on the vaccine) given at least 6 months apart; talk with your healthcare provider    Hepatitis B Women at increased risk for infection  2 or 3 doses (depending on the vaccine) ; second dose should be given 1 month after the first dose; if a third dose, it should be given at least 2 months after the second dose and at least 4 months after the first dose; talk with your healthcare provider    Haemophilus influenzae Type B (HIB)  Women at increased risk for infection  1 or 3 doses; talk with your healthcare provider    Influenza (flu) All women in this age group  Once a year   Measles, mumps, rubella (MMR)  Women in this age group born in 1957 or later who have no record of these infections or vaccines  1 or 2doses   Meningococcal Women at increased risk for infection  1 or more doses; talk with your healthcare provider    Pneumococcal conjugate vaccine (PCV13) and pneumococcal polysaccharide vaccine (PPSV23)  Women at increased risk for infection  PCV13: 1 dose ages 19 to 64 (protects against 13 types of pneumococcal bacteria)   PPSV23: 1 or 2 doses through age 64(protects against 23 types of pneumococcal bacteria)   Talk with your healthcare provider   Tetanus/diphtheria/pertussis (Td/Tdap) booster All women in this age group  Td every 10 years, or a 1-time dose of Tdap instead of a Td booster after age 18, then Td  every 10 years    Recombinant zoster vaccine (RZV)  All women ages 50 and older  If 2 doses; the 2nd dose is given 2 to 6 months after the first. This is given even if you've had shingles before or had a previous zoster live vaccine.    Counseling Who needs it How often   BRCA gene mutation testing for breast and ovarian cancer susceptibility  Women with increased risk for having gene mutation  When your risk is known; talk with your healthcare provider    Breast cancer and chemoprevention  Women at high risk for breast cancer  When your risk is known; talk with your healthcare provider    Diet and exercise Women who are overweight or obese  When diagnosed, and then at routine exams    Sexually transmitted infection prevention  Women at increased risk for infection  At routine exams; talk with your healthcare provider    Use of daily aspirin  Women ages 50 and up who are at high risk for cardiovascular health problems and not at increased risk for bleeding as identified by their healthcare provider  When your risk is known; talk with your healthcare provider    Use of tobacco and the health effects it can cause  All women in this age group  Every exam   [employer] last reviewed this educational content on 6/1/2020 2000-2021 [employer]. All rights reserved. This information is not intended as a substitute for professional medical care. Always follow your healthcare professional's instructions.           Patient Education     Checking Your Own Blood Pressure    Blood pressure checks are a common reason for visits to your healthcare provider. Yet, for less than the cost of a single appointment, you may be able to purchase your own blood pressure monitor. This way you can check the reading yourself at home.  Blood pressure is the force of blood against the walls of your arteries. Blood pressure readings tend to vary, depending on many factors, including stress levels and time of day. Your blood pressure  reading in a healthcare provider's office can be as much as 20 or 30 points higher. The nervousness of being there can be enough to increase blood pressure.  Home blood pressure kits  You may buy blood pressure monitors at pharmacies, medical supply stores, and discount chain stores. An electronic digital monitor that is battery operated is often easier to use than the more traditional blood pressure cuff. Electronic monitors usually cost more.  It s important to check the accuracy of either type of monitor every so often. One way to make sure your monitor is accurate is to take it with you to your next healthcare provider appointment. Take your blood pressure with your monitor and compare it with the reading from your provider's monitor.  Ask your healthcare provider or pharmacist to recommend a monitor for you. Keep in mind that if you have a large upper arm, you'll need a special, large cuff to get a proper reading.  Read the instructions  Each type of blood pressure monitor works differently. Be sure to read the instructions that come with yours. Ask your healthcare provider, nurse, or pharmacist to teach you how to use it. Many people can check their own blood pressure at home without difficulty. Some need help from a family member or friend.  Your home blood pressure reading is more likely to be accurate if you do the following:    Don't take readings within a half-hour after smoking, exercising, or drinking beverages with caffeine.    Take 2 or 3 readings at least 1 minute apart, and average the results.    Take readings at different times during the day, or on several days at different times.    Before you take your blood pressure, sit for 5 minutes with your back supported and your feet flat on the ground. Rest your arm on a table at the level of your heart.    Use the bathroom before taking your reading. A full bladder can change the results.  Blood pressure measurements are given as 2 numbers. Systolic  blood pressure is the upper number. This is the pressure when the heart contracts. Diastolic blood pressure is the lower number. This is the pressure when the heart relaxes between beats. Both numbers in a blood pressure reading are important. As we grow older, systolic blood pressure is particularly important.  Blood pressure is categorized as normal, elevated, or stage 1 or stage 2 high blood pressure:    Normal blood pressure is systolic of less than 120 and diastolic of less than 80 (120/80)    Elevated blood pressure is systolic of 120 to 129 and diastolic less than 80    Stage 1 high blood pressure is systolic is 130 to 139 or diastolic between 80 to 89    Stage 2 high blood pressure is when systolic is 140 or higher or the diastolic is 90 or higher  Get immediate medical care if your blood pressure is much higher or lower than expected. Whenever you visit your healthcare provider, take your blood pressure record with you.  As a monitor ages, it may become less accurate. If the equipment or monitor you have is older than 5 years, you may need a new monitor. Take your monitor with you to your healthcare appointments and check the accuracy of the monitor against the reading the providers are getting.    8784-5664 [employer]. All rights reserved. This information is not intended as a substitute for professional medical care. Always follow your healthcare professional's instructions.           Patient Education     Lowering Your Blood Pressure with DASH  What is the DASH eating plan?  DASH (Dietary Approaches to Stop Hypertension) can help you prevent or lower high blood pressure. This eating plan provides the nutrients that help lower blood pressure: potassium, magnesium, calcium, protein and fiber.   If not controlled, high blood pressure may lead to heart disease, stroke or blindness.  This eating plan is rich in:     Fruits and vegetables    Whole grains    Fat-free or low-fat milk  products    Fish and poultry (chicken, turkey, etc.)    Beans, seeds and nuts.  This eating plan is low in:     Salt and sodium    Sugar, sweets and drinks with sugar    Red meat, saturated fat and trans fat.  Lifestyle changes  Besides a healthy eating plan, other steps are needed to help control high blood pressure.     Stay at a healthy weight.    Be active for at least 30 minutes on most days.    If you drink alcohol, have no more than two drinks per day (for men) or one per day (for women).    If you take blood pressure medicine, take it as directed.  Losing weight with DASH  You can lose weight by eating fewer calories. It is best to take off pounds slowly over time. Talk to a dietitian about the best way to do this.  Staying active   To shed pounds, combine DASH with daily exercise. Start with a 15-minute walk each day. Slowly increase the time until you reach a total of 30 minutes on most days. To avoid weight gain, try for 60 minutes each day. Check with your doctor before starting any exercise program.  Tips for less sodium    Avoid processed foods. These may include baked goods, cereals, soy sauce and even antacids.    Cook with less salt. Don't bring the saltshaker to the table.    Season food with herbs, spices, lemon, lime, vinegar, wine and salt-free seasoning blends.    Use low-sodium canned vegetables or fruits.  Tips to ease the change  Take a week or two to slowly make changes to your diet.    Add a serving of vegetables at lunch one day. The next day, add a serving at dinner as well.    Add fruit to one meal or eat it as a snack.    Work up to three servings of fat-free and low-fat milk products each day.    If you eat large portions of meat, cut back by a third at each meal. The goal is to eat 6 oz (ounces) of meat or less per day.    Serve brown rice and whole-wheat bread and pasta.    Try casseroles and stir-joseph dishes that have less meat and more vegetables, grains or cooked dry  beans.    Serve two or more meatless meals each week.    For snacks and desserts, eat foods low in fat, sodium and sugar, such as:  ? Unsalted rice cakes, nuts or seeds  ? Fresh fruits, raw vegetables and raisins  ? Fat-free, low-fat or frozen yogurt  ? Popcorn with no salt or butter.    If you have trouble digesting milk products, try lactose-free milk or take lactase pills.    If you have a nut allergy, eat seeds, beans, lentils or split peas.    Fruits, vegetables and whole grain foods are high in fiber. To avoid bloating and diarrhea (loose, watery stools), increase these foods over several weeks.  The DASH eating plan  Use this chart to plan your meals. Or take it with you when you shop for food.   Food Group Servings per Day  Serving Size Examples    1,600 Calories 2,000 Calories 2,600 Calories      Grains  (whole wheat) 6 6 to 8 10 to 11   1 slice bread    1 oz dry cereal      cup cooked rice, pasta or cereal Whole-wheat bread and rolls, whole-wheat pasta, English muffin, ryder bread, bagel, cereals, grits, oatmeal, brown rice, unsalted pretzels and popcorn   Vegetables  3 to 4 4 to 5 5 to 6   1 cup raw leafy vegetables      cup cut-up raw or cooked vegetable      cup vegetable juice Broccoli, carrots, collards, green beans, green peas, kale, lima beans, potatoes, spinach, squash, sweet potatoes, tomatoes   Fruits 4 4 to 5 5 to 6   1 medium fruit      cup dried fruit      cup fresh, frozen, or canned fruit      cup fruit juice Apples, apricots, bananas, dates, grapes, oranges, grapefruit, mangoes, melons, peaches, pineapples, raisins, strawberries, tangerines   Fat-free or   low-fat milk and milk products 2 to 3 2 to 3 3   1 cup milk or yogurt    1   oz cheese Fat-free or low-fat (1%) milk, buttermilk, cheese, regular or frozen yogurt   Lean meats, poultry and fish 3 to 6 6 or less 6   1 oz cooked meats, poultry (chicken, turkey) or fish    1 egg    2 egg whites Lean meats (trim away any fat, then broil, roast  or poach); remove skin from poultry. Eggs are high in cholesterol, so limit egg yolks to four or less per week.   Nuts, seeds   and legumes 3 per week 4 to 5 per week 1 per day   ? cup (1   oz) nuts    2 Tbsp peanut butter    2 Tbsp (   oz) seeds      cup cooked legumes (dry beans and peas) Almonds, hazelnuts, mixed nuts, peanuts, walnuts, sunflower seeds, peanut butter, kidney beans, lentils, split peas   Fats and oils 2 2 to 3 3   1 tsp soft margarine    1 tsp vegetable oil    1 Tbsp martinez    2 Tbsp salad dressing Soft margarine, vegetable oil (such as canola, corn, olive or safflower), low-fat mayonnaise, light salad dressing   Sweets and   added sugars 0 5 or less per week 2 or less per day   1 Tbsp sugar, jelly or jam      cup sorbet or gelatin    1 cup lemonade Fruit-flavored gelatin, fruit punch, hard candy, jelly, maple syrup, sorbets and ices   A sample meal plan  This sample menu gives two sodium levels. Start with 2,300 mg of sodium (about 1 teaspoon of table salt per day). Then, try to lower your intake to 1,500 mg a day. Talk to your doctor or dietitian about how to do this.   These samples are for people who eat 2,000 calories per day. The less salt you eat, the more you may lower your blood pressure.   Menu for 2,300 mg sodium per day   Breakfast:   cup instant oatmeal, 1 mini whole-wheat bagel, 1 tablespoon peanut butter, 1 medium banana, 1 cup (8 ounces) low-fat milk.   Lunch: 1 cup cantaloupe chunks, 1 cup apple juice and one chicken breast sandwich that includes:    Two slices (3 ounces) chicken breast, no skin    Two slices whole-wheat bread    1 slice (   ounce) reduced-fat cheddar cheese    One leaf jamshid lettuce    Two slices tomato    1 tablespoon low-fat mayonnaise.  Dinner: 1 cup cooked spaghetti,   cup low-salt vegetarian spaghetti sauce, 3 tablespoons Parmesan cheese;   cup corn (from frozen);   cup canned pears in juice; one spinach salad that includes:    1 cup fresh spinach  "leaves      cup fresh carrots, grated      cup fresh mushrooms, sliced    1 tablespoon vinegar and oil dressing.  Snacks:? cup unsalted almonds;   cup dried apricots; 1 cup fat-free fruit yogurt, no sugar added.  Reducing to 1,500 mg sodium per day  Use the same menu plan, but:    For breakfast, replace instant oatmeal with regular oatmeal and 1 teaspoon cinnamon.    For lunch, replace cheddar cheese with low-sodium Swiss cheese.  Resources on diet and health  National Heart, Lung and Blood [place]  [facility]  [address] [address]   [place] [address]   Phone: [phone]   TTY: [phone]   www.nhlbi.nih.gov   \"Aim for a Healthy Weight\"   www.nhlbi.nih.gov/health/public/heart/obesity/lose_wt/index.htm  \"Dietary Guidelines for Americans 2005\"   and \"A Healthier You\"   www.healthierus.gov/dietaryguidelines  For informational purposes only. Not to replace the advice of your health care provider. Adapted from \"Your Guide to Lowering Blood Pressure with DASH,\" by the National Heart, Lung and Blood [place],   NIH Publication No. , revised April 2006. Available at www.nhlbi.nih.gov/health/public/heart/hbp/dash/index.htm. Published by [employer]. [employer] 486475 - REV 09/15.  For informational purposes only. Not to replace the advice of your health care provider.  Copyright   2018 [employer]. All rights reserved.           "

## 2021-06-30 ENCOUNTER — TELEPHONE (OUTPATIENT)
Dept: FAMILY MEDICINE | Facility: CLINIC | Age: 56
End: 2021-06-30

## 2021-06-30 DIAGNOSIS — E78.5 HYPERLIPIDEMIA LDL GOAL <130: Primary | ICD-10-CM

## 2021-06-30 RX ORDER — ROSUVASTATIN CALCIUM 5 MG/1
5 TABLET, COATED ORAL DAILY
Qty: 30 TABLET | Refills: 3 | Status: SHIPPED | OUTPATIENT
Start: 2021-06-30 | End: 2021-10-29

## 2021-06-30 NOTE — TELEPHONE ENCOUNTER
----- Message from Reza Olson NP sent at 6/30/2021 11:56 AM CDT -----  Please call and let her know the thyroid studies are normal so no changes needed in regards to the synthroid, the cholesterol at this point has gotten quite a bit worse. I would suggest she start a cholesterol lowering medication, unless she is committed to a heart healthy diet and increasing activity to 150 minutes a week. If she plans to change her diet and life style we would recheck next year, if patient wants to discuss statin should should follow up with provider to discuss treatment around the hyperlipidemia. I would suggest Kylie Coffman or Trang Major for future follow up.     Thank you,     Reza Olson CNP

## 2021-06-30 NOTE — TELEPHONE ENCOUNTER
Spoke with patient and informed of note below. Patient understood. Would like script to scott in [place].   Emilee Benson MA

## 2021-07-01 ENCOUNTER — TELEPHONE (OUTPATIENT)
Dept: FAMILY MEDICINE | Facility: CLINIC | Age: 56
End: 2021-07-01

## 2021-07-01 NOTE — TELEPHONE ENCOUNTER
Left message for patient to return call to schedule EGD/colonoscopy. If Gisell or Kylie are not available, please transfer to same day surgery

## 2021-07-01 NOTE — LETTER

## 2021-07-05 ENCOUNTER — HOSPITAL ENCOUNTER (OUTPATIENT)
Dept: MAMMOGRAPHY | Facility: CLINIC | Age: 56
Discharge: HOME | End: 2021-07-05
Attending: NURSE PRACTITIONER | Admitting: NURSE PRACTITIONER
Payer: COMMERCIAL

## 2021-07-05 DIAGNOSIS — Z12.31 ENCOUNTER FOR SCREENING MAMMOGRAM FOR BREAST CANCER: ICD-10-CM

## 2021-07-05 PROCEDURE — 77063 BREAST TOMOSYNTHESIS BI: CPT

## 2021-07-12 NOTE — TELEPHONE ENCOUNTER
Date of procedure: 8/16  Colonoscopy  Surgeon: Dr. Shipley  Prep:Miralax  Packet:Colonoscopy/EGD instructions mailed to patient's home address. and Colonoscopy/EGD instructions were sent to the patient in [employer].   Date: 7/12/2021      Surgery Scheduler

## 2021-07-14 DIAGNOSIS — Z11.59 ENCOUNTER FOR SCREENING FOR OTHER VIRAL DISEASES: ICD-10-CM

## 2021-08-10 RX ORDER — PSEUDOEPHEDRINE HCL 120 MG/1
120 TABLET, FILM COATED, EXTENDED RELEASE ORAL DAILY PRN
COMMUNITY

## 2021-08-12 ENCOUNTER — LAB (OUTPATIENT)
Dept: LAB | Facility: CLINIC | Age: 56
End: 2021-08-12
Payer: COMMERCIAL

## 2021-08-12 DIAGNOSIS — Z11.59 ENCOUNTER FOR SCREENING FOR OTHER VIRAL DISEASES: ICD-10-CM

## 2021-08-12 LAB — SARS-COV-2 RNA RESP QL NAA+PROBE: NEGATIVE

## 2021-08-12 PROCEDURE — U0005 INFEC AGEN DETEC AMPLI PROBE: HCPCS

## 2021-08-12 PROCEDURE — U0003 INFECTIOUS AGENT DETECTION BY NUCLEIC ACID (DNA OR RNA); SEVERE ACUTE RESPIRATORY SYNDROME CORONAVIRUS 2 (SARS-COV-2) (CORONAVIRUS DISEASE [COVID-19]), AMPLIFIED PROBE TECHNIQUE, MAKING USE OF HIGH THROUGHPUT TECHNOLOGIES AS DESCRIBED BY CMS-2020-01-R: HCPCS

## 2021-08-16 ENCOUNTER — SURGERY (OUTPATIENT)
Age: 56
End: 2021-08-16
Payer: COMMERCIAL

## 2021-08-16 ENCOUNTER — HOSPITAL ENCOUNTER (OUTPATIENT)
Facility: CLINIC | Age: 56
Discharge: HOME | End: 2021-08-16
Attending: FAMILY MEDICINE | Admitting: FAMILY MEDICINE
Payer: COMMERCIAL

## 2021-08-16 ENCOUNTER — ANESTHESIA (OUTPATIENT)
Dept: GASTROENTEROLOGY | Facility: CLINIC | Age: 56
End: 2021-08-16
Payer: COMMERCIAL

## 2021-08-16 ENCOUNTER — ANESTHESIA EVENT (OUTPATIENT)
Dept: GASTROENTEROLOGY | Facility: CLINIC | Age: 56
End: 2021-08-16
Payer: COMMERCIAL

## 2021-08-16 VITALS
DIASTOLIC BLOOD PRESSURE: 83 MMHG | HEART RATE: 80 BPM | OXYGEN SATURATION: 98 % | TEMPERATURE: 98.1 F | HEIGHT: 61 IN | SYSTOLIC BLOOD PRESSURE: 139 MMHG | WEIGHT: 140 LBS | BODY MASS INDEX: 26.43 KG/M2 | RESPIRATION RATE: 16 BRPM

## 2021-08-16 LAB — COLONOSCOPY: NORMAL

## 2021-08-16 PROCEDURE — 258N000003 HC RX IP 258 OP 636: Performed by: NURSE ANESTHETIST, CERTIFIED REGISTERED

## 2021-08-16 PROCEDURE — 45380 COLONOSCOPY AND BIOPSY: CPT | Mod: PT | Performed by: FAMILY MEDICINE

## 2021-08-16 PROCEDURE — 250N000009 HC RX 250: Performed by: NURSE ANESTHETIST, CERTIFIED REGISTERED

## 2021-08-16 PROCEDURE — 250N000011 HC RX IP 250 OP 636: Performed by: NURSE ANESTHETIST, CERTIFIED REGISTERED

## 2021-08-16 PROCEDURE — 45380 COLONOSCOPY AND BIOPSY: CPT | Performed by: FAMILY MEDICINE

## 2021-08-16 PROCEDURE — 370N000017 HC ANESTHESIA TECHNICAL FEE, PER MIN: Performed by: FAMILY MEDICINE

## 2021-08-16 PROCEDURE — 88305 TISSUE EXAM BY PATHOLOGIST: CPT | Mod: TC | Performed by: FAMILY MEDICINE

## 2021-08-16 RX ORDER — NALOXONE HYDROCHLORIDE 0.4 MG/ML
0.4 INJECTION, SOLUTION INTRAMUSCULAR; INTRAVENOUS; SUBCUTANEOUS
Status: DISCONTINUED | OUTPATIENT
Start: 2021-08-16 | End: 2021-08-16 | Stop reason: HOSPADM

## 2021-08-16 RX ORDER — SODIUM CHLORIDE, SODIUM LACTATE, POTASSIUM CHLORIDE, CALCIUM CHLORIDE 600; 310; 30; 20 MG/100ML; MG/100ML; MG/100ML; MG/100ML
INJECTION, SOLUTION INTRAVENOUS CONTINUOUS
Status: DISCONTINUED | OUTPATIENT
Start: 2021-08-16 | End: 2021-08-16 | Stop reason: HOSPADM

## 2021-08-16 RX ORDER — PROPOFOL 10 MG/ML
INJECTION, EMULSION INTRAVENOUS CONTINUOUS PRN
Status: DISCONTINUED | OUTPATIENT
Start: 2021-08-16 | End: 2021-08-16

## 2021-08-16 RX ORDER — ONDANSETRON 2 MG/ML
4 INJECTION INTRAMUSCULAR; INTRAVENOUS EVERY 6 HOURS PRN
Status: DISCONTINUED | OUTPATIENT
Start: 2021-08-16 | End: 2021-08-16 | Stop reason: HOSPADM

## 2021-08-16 RX ORDER — FLUMAZENIL 0.1 MG/ML
0.2 INJECTION, SOLUTION INTRAVENOUS
Status: DISCONTINUED | OUTPATIENT
Start: 2021-08-16 | End: 2021-08-16 | Stop reason: HOSPADM

## 2021-08-16 RX ORDER — NALOXONE HYDROCHLORIDE 0.4 MG/ML
0.2 INJECTION, SOLUTION INTRAMUSCULAR; INTRAVENOUS; SUBCUTANEOUS
Status: DISCONTINUED | OUTPATIENT
Start: 2021-08-16 | End: 2021-08-16 | Stop reason: HOSPADM

## 2021-08-16 RX ORDER — ONDANSETRON 2 MG/ML
4 INJECTION INTRAMUSCULAR; INTRAVENOUS
Status: DISCONTINUED | OUTPATIENT
Start: 2021-08-16 | End: 2021-08-16 | Stop reason: HOSPADM

## 2021-08-16 RX ORDER — ONDANSETRON 4 MG/1
4 TABLET, ORALLY DISINTEGRATING ORAL EVERY 6 HOURS PRN
Status: DISCONTINUED | OUTPATIENT
Start: 2021-08-16 | End: 2021-08-16 | Stop reason: HOSPADM

## 2021-08-16 RX ORDER — PROCHLORPERAZINE MALEATE 5 MG
10 TABLET ORAL EVERY 6 HOURS PRN
Status: DISCONTINUED | OUTPATIENT
Start: 2021-08-16 | End: 2021-08-16 | Stop reason: HOSPADM

## 2021-08-16 RX ORDER — LIDOCAINE HYDROCHLORIDE 20 MG/ML
INJECTION, SOLUTION INFILTRATION; PERINEURAL PRN
Status: DISCONTINUED | OUTPATIENT
Start: 2021-08-16 | End: 2021-08-16

## 2021-08-16 RX ORDER — LIDOCAINE 40 MG/G
CREAM TOPICAL
Status: DISCONTINUED | OUTPATIENT
Start: 2021-08-16 | End: 2021-08-16 | Stop reason: HOSPADM

## 2021-08-16 RX ORDER — PROPOFOL 10 MG/ML
INJECTION, EMULSION INTRAVENOUS PRN
Status: DISCONTINUED | OUTPATIENT
Start: 2021-08-16 | End: 2021-08-16

## 2021-08-16 RX ADMIN — PROPOFOL 200 MCG/KG/MIN: 10 INJECTION, EMULSION INTRAVENOUS at 08:22

## 2021-08-16 RX ADMIN — PROPOFOL 50 MG: 10 INJECTION, EMULSION INTRAVENOUS at 08:21

## 2021-08-16 RX ADMIN — PROPOFOL 30 MG: 10 INJECTION, EMULSION INTRAVENOUS at 08:22

## 2021-08-16 RX ADMIN — LIDOCAINE HYDROCHLORIDE 50 MG: 20 INJECTION, SOLUTION INFILTRATION; PERINEURAL at 08:21

## 2021-08-16 RX ADMIN — PROPOFOL 50 MG: 10 INJECTION, EMULSION INTRAVENOUS at 08:29

## 2021-08-16 RX ADMIN — SODIUM CHLORIDE, POTASSIUM CHLORIDE, SODIUM LACTATE AND CALCIUM CHLORIDE: 600; 310; 30; 20 INJECTION, SOLUTION INTRAVENOUS at 07:30

## 2021-08-16 ASSESSMENT — LIFESTYLE VARIABLES: TOBACCO_USE: 1

## 2021-08-16 ASSESSMENT — MIFFLIN-ST. JEOR: SCORE: 1162.42

## 2021-08-16 NOTE — DISCHARGE INSTRUCTIONS
[facility]    Home Care Following Endoscopy          Activity:    You have just undergone an endoscopic procedure usually performed with conscious sedation.  Do not work or operate machinery (including a car) for at least 12 hours.      I encourage you to walk and attempt to pass this air as soon as possible.    Diet:    Return to the diet you were on before your procedure but eat lightly for the first 12-24 hours.    Drink plenty of water.    Resume any regular medications unless otherwise advised by your physician.  Please begin any new medication prescribed as a result of your procedure as directed by your physician.     If you had any biopsy or polyp removed please refrain from aspirin or aspirin products for 2 days.  If on Coumadin please restart as instructed by your physician.   Pain:    You may take Tylenol as needed for pain.  Expected Recovery:    You can expect some mild abdominal fullness and/or discomfort due to the air used to inflate your intestinal tract. It is also normal to have a mild sore throat after upper endoscopy.    Call Your Physician if You Have:    After Colonoscopy:  o Worsening persisting abdominal pain which is worse with activity.  o Fevers (>101 degrees F), chills or shakes.  o Passage of continued blood with bowel movements.   Any questions or concerns about your recovery, please call [phone] or after hours [phone] Nurse Advice Line.    Follow-up Care:    You should receive a call or letter with your results within 1 week. Please call if you have not received a notification of your results.

## 2021-08-16 NOTE — ANESTHESIA PREPROCEDURE EVALUATION
Anesthesia Pre-Procedure Evaluation    Patient: Elisa Al   MRN: [id number] : 1965        Preoperative Diagnosis: Screen for colon cancer [Z12.11]   Procedure : Procedure(s):  COLONOSCOPY     Past Medical History:   Diagnosis Date     Anxiety      Benign neoplasm of other specified sites of skin 2006    Finger - right third finger proximal palmar surface     Degenerative disk disease 12    anterior exposure of L4-L5 and L5-S1 disk spaces for fusion     Depressive disorder      Gout, unspecified      Other and unspecified ovarian cyst      Unspecified hypothyroidism years     Unspecified intestinal obstruction 2005    Partial bowel obstruction, Obstipation.      Past Surgical History:   Procedure Laterality Date     C LAP,ABD/PERIT/OMENTUM,UNLIST  04/12/10    pelvic adhesion     C TOTAL ABDOM HYSTERECTOMY  2004    TAHRSO, Cystoscopy.     CARPAL TUNNEL RELEASE RT/LT      right wrist     EXPLORE SPINE, REMOVE HARDWARE, COMBINED  2014    Procedure: COMBINED EXPLORE SPINE, REMOVE HARDWARE;  Hardware Removal Lumbar 4-Sacral 1, Exploration of Fusion;  Surgeon: Brenton Ashford MD;  Location: [facility]     FUSION LUMBAR ANTERIOR THREE+ LEVELS       HC EXCISION TENDON SHEATH LESION, HAND/FINGR  2006    Right middle finger flexor sheath MP joint.     HC EXCISION TENDON SHEATH LESION, HAND/FINGR  06    Right middle finger     HC LAPAROSCOPY, SURGICAL; CHOLECYSTECTOMY  2004    Cholecystectomy, Laparoscopic     HC REMOVAL OF OVARY/TUBE(S)      left ovary removed secondary to cyst     HC REMOVAL OF TONSILS,12+ Y/O      Tonsils 12+y.o.     HC REVISE MEDIAN N/CARPAL TUNNEL SURG  2005    Left     [facility] COLONOSCOPY W BIOPSY  04/26/10     [facility] COLONOSCOPY W/WO BRUSH/WASH  2005     [facility] UGI ENDOSCOPY, SIMPLE EXAM  2005      Allergies   Allergen Reactions     Aspirin Swelling     Erythromycin Ethylsuccinate GI Disturbance     gi upset     Ibuprofen Swelling      Naproxen      Nsaids Swelling     Zelnorm [Tegaserod Maleate]       Social History     Tobacco Use     Smoking status: Current Every Day Smoker     Packs/day: 0.25     Years: 23.00     Pack years: 5.75     Smokeless tobacco: Former User     Quit date: 1/19/2013   Substance Use Topics     Alcohol use: Yes     Alcohol/week: 0.0 standard drinks     Comment: nightly      Wt Readings from Last 1 Encounters:   06/28/21 69.2 kg (152 lb 9 oz)        Anesthesia Evaluation   Pt has had prior anesthetic. Type: General and MAC.    No history of anesthetic complications       ROS/MED HX  ENT/Pulmonary:     (+) tobacco use, Current use,     Neurologic:  - neg neurologic ROS     Cardiovascular:  - neg cardiovascular ROS     METS/Exercise Tolerance:     Hematologic:  - neg hematologic  ROS     Musculoskeletal: Comment: Degenerative disc disease  (+) arthritis,     GI/Hepatic: Comment: Hx of intestinal obstruction      Renal/Genitourinary:  - neg Renal ROS     Endo:     (+) thyroid problem, hypothyroidism,     Psychiatric/Substance Use:     (+) psychiatric history depression and anxiety     Infectious Disease:  - neg infectious disease ROS     Malignancy:  - neg malignancy ROS     Other:  - neg other ROS          Physical Exam    Airway  airway exam normal      Mallampati: II   TM distance: > 3 FB   Neck ROM: full   Mouth opening: > 3 cm    Respiratory Devices and Support         Dental  no notable dental history         Cardiovascular   cardiovascular exam normal       Rhythm and rate: regular and normal     Pulmonary   pulmonary exam normal        breath sounds clear to auscultation           OUTSIDE LABS:  CBC:   Lab Results   Component Value Date    WBC 8.3 10/04/2019    WBC 7.3 05/08/2018    HGB 14.6 10/04/2019    HGB 13.9 05/08/2018    HCT 43.3 10/04/2019    HCT 41.9 05/08/2018     10/04/2019     05/08/2018     BMP:   Lab Results   Component Value Date     06/28/2021     10/04/2019    POTASSIUM 3.9  06/28/2021    POTASSIUM 3.9 10/04/2019    CHLORIDE 107 06/28/2021    CHLORIDE 106 10/04/2019    CO2 28 06/28/2021    CO2 26 10/04/2019    BUN 13 06/28/2021    BUN 12 10/04/2019    CR 0.70 06/28/2021    CR 0.60 10/04/2019    GLC 95 06/28/2021    GLC 93 10/04/2019     COAGS: No results found for: PTT, INR, FIBR  POC:   Lab Results   Component Value Date    HCG Negative 04/12/2004     HEPATIC:   Lab Results   Component Value Date    ALBUMIN 3.9 10/04/2019    PROTTOTAL 7.2 10/04/2019    ALT 25 10/04/2019    AST 18 10/04/2019    ALKPHOS 79 10/04/2019    BILITOTAL 0.3 10/04/2019     OTHER:   Lab Results   Component Value Date    COURTNEY 8.8 06/28/2021    MAG 2.2 08/03/2008    LIPASE 101 02/15/2005    TSH 1.70 06/28/2021    T4 0.93 12/21/2005    CRP 4.5 12/05/2006    SED 7 10/04/2019       Anesthesia Plan    ASA Status:  2   NPO Status:  NPO Appropriate    Anesthesia Type: MAC.     - Reason for MAC: Deep or markedly invasive procedure (G8)   Induction: Intravenous.   Maintenance: TIVA.        Consents    Anesthesia Plan(s) and associated risks, benefits, and realistic alternatives discussed. Questions answered and patient/representative(s) expressed understanding.     - Discussed with:  Patient      - Extended Intubation/Ventilatory Support Discussed: No.      - Patient is DNR/DNI Status: No    Use of blood products discussed: Yes.     - Discussed with: Patient.     - Consented: consented to blood products            Reason for refusal: other.     Postoperative Care    Pain management: Multi-modal analgesia.   PONV prophylaxis: Background Propofol Infusion     Comments:    The risks and benefits of anesthesia, and the alternatives where applicable, have been discussed with the patient, and they wish to proceed.              Darline Pierre, FLORENCIA CRNA

## 2021-08-16 NOTE — LETTER
"August 18, 2021      Elisa Al  [address] [address]        Dear ,    We are writing to inform you of your test results.    Your biopsy showed the 'pre-cancerous' type polyp. I would recommend a follow up in 5-10 yrs like we discussed. Thanks,         Celso Shipley MD       Resulted Orders   Surgical Pathology Exam   Result Value Ref Range    Case Report       Surgical Pathology Report                         Case: [id number]                                  Authorizing Provider:  Celso Shipley MD  Collected:           08/16/2021 08:33 AM          Ordering Location:     [facility]          Received:            08/16/2021 09:00 AM                                 [facility]                                                          Pathologist:           Devan Hale MD                                                     Specimen:    Large Intestine, Colon, Cecum, ceccal polyp                                                Final Diagnosis       A. Large Intestine Cecum Polyp:  - Tubular adenoma.  - Negative for high grade dysplasia or malignancy.        Clinical Information       56 year old female.      Case Images      Gross Description       A. Large Intestine, Colon, Cecum, ceccal polyp:  The specimen is received in formalin, labeled with the patient's name, medical record number and other identifying information designated \"cecum polyp\". It consists of 2 tan polypoid structures measuring up to 0.2 cm each.  The specimens are entirely submitted in 1 cassette.        ([facility])      Microscopic Description       The sections show portions of a low grade adenomatous colon polyp.  The polyp glands are lined by mitotically active pseudostratified columnar epithelium with elongated and tapered basal oriented nuclei.  The glands show a partial depletion of the normal number of goblet cells.  No areas of high-grade dysplasia or malignancy are " identified. (Devan Hale MD, 8/17/2021)        Performing Labs       The technical component of this testing was completed at [facility]         If you have any questions or concerns, please call the clinic at the number listed above.

## 2021-08-16 NOTE — H&P
"Pre-Endoscopy History and Physical     Elisa Al MRN# [id number]   YOB: 1965 Age: 56 year old     Date of Procedure: 8/16/2021  Primary care provider: Anish Elizabeth  Type of Endoscopy: colonoscopy  Type of Anesthesia Anticipated: MAC     HPI:    Elisa is a 56 year old female who was referred to me for colonoscopy.    Elisa is feeling well today.      Patient Active Problem List   Diagnosis     Pain in joint, shoulder region     CARDIOVASCULAR SCREENING; LDL GOAL LESS THAN 160     Hypothyroidism, unspecified type     Major depression in complete remission (H)     Primary osteoarthritis of left knee     Peroneal tendonitis, left            PHYSICAL EXAM:   BP (!) 160/115   Temp 98.1  F (36.7  C) (Oral)   Resp 16   Ht 1.549 m (5' 1\")   Wt 63.5 kg (140 lb)   LMP 01/13/2004   SpO2 97%   BMI 26.45 kg/m     Estimated body mass index is 26.45 kg/m  as calculated from the following:    Height as of this encounter: 1.549 m (5' 1\").    Weight as of this encounter: 63.5 kg (140 lb).    GENERAL APPEARANCE: alert and no distress. Appears to comprehend the procedure and indication  RESP: lungs unlabored  CV: regular  ABD: soft, nt/nd    DIAGNOSTICS:      COVID-19 PCR Results    COVID-19 PCR Results 8/12/21   SARS CoV2 PCR Negative      Comments are available for some flowsheets but are not being displayed.         COVID-19 Antibody Results, Testing for Immunity    COVID-19 Antibody Results, Testing for Immunity   No data to display.              IMPRESSION   ASA Class 1 - Healthy patient, no medical problems        PLAN:     Plan for colonoscopy. No medical contraindications to proceed, or further work up needed. The risks and benefits of the procedure and the sedation options and risks were discussed with the patient. These include infection, bleeding, and small risk of colon perforation (1/1000 to 1/12006 depending on patient characteristics and type of procedure). Elisa was also explained " to alternatives for colo-rectal screening. All questions were answered and informed consent was obtained.      The above has been forwarded to the consulting provider.      Signed Electronically by: Celso Shipley MD  August 16, 2021

## 2021-08-16 NOTE — ANESTHESIA CARE TRANSFER NOTE
Patient: Elisa Al    Procedure(s):  COLONOSCOPY, WITH POLYPECTOMY    Diagnosis: Screen for colon cancer [Z12.11]  Diagnosis Additional Information: No value filed.    Anesthesia Type:   MAC     Note:    Oropharynx: oropharynx clear of all foreign objects and spontaneously breathing  Level of Consciousness: drowsy  Oxygen Supplementation: face mask    Independent Airway: airway patency satisfactory and stable  Dentition: dentition unchanged  Vital Signs Stable: post-procedure vital signs reviewed and stable  Report to RN Given: handoff report given  Patient transferred to: Phase II    Handoff Report: Identifed the Patient, Identified the Reponsible Provider, Reviewed the pertinent medical history, Discussed the surgical course, Reviewed Intra-OP anesthesia mangement and issues during anesthesia, Set expectations for post-procedure period and Allowed opportunity for questions and acknowledgement of understanding      Vitals:  Vitals Value Taken Time   /86 08/16/21 0900   Temp     Pulse 74 08/16/21 0900   Resp     SpO2 98 % 08/16/21 0908   Vitals shown include unvalidated device data.    Electronically Signed By: FLORENCIA Simon CRNA  August 16, 2021  9:09 AM

## 2021-08-16 NOTE — ANESTHESIA POSTPROCEDURE EVALUATION
Patient: Elisa Al    Procedure(s):  COLONOSCOPY, WITH POLYPECTOMY    Diagnosis:Screen for colon cancer [Z12.11]  Diagnosis Additional Information: No value filed.    Anesthesia Type:  MAC    Note:  Disposition: Outpatient   Postop Pain Control: Uneventful            Sign Out: Well controlled pain   PONV: No   Neuro/Psych: Uneventful            Sign Out: Acceptable/Baseline neuro status   Airway/Respiratory: Uneventful            Sign Out: Acceptable/Baseline resp. status   CV/Hemodynamics: Uneventful            Sign Out: Acceptable CV status   Other NRE: NONE   DID A NON-ROUTINE EVENT OCCUR? No    Event details/Postop Comments:  Pt was happy with anesthesia care.  No complications.  I will follow up with the pt if needed.           Last vitals:  Vitals Value Taken Time   /86 08/16/21 0900   Temp     Pulse 74 08/16/21 0900   Resp     SpO2 98 % 08/16/21 0908   Vitals shown include unvalidated device data.    Electronically Signed By: FLORENCIA Simon CRNA  August 16, 2021  9:10 AM

## 2021-08-17 LAB
PATH REPORT.COMMENTS IMP SPEC: NORMAL
PATH REPORT.COMMENTS IMP SPEC: NORMAL
PATH REPORT.FINAL DX SPEC: NORMAL
PATH REPORT.GROSS SPEC: NORMAL
PATH REPORT.MICROSCOPIC SPEC OTHER STN: NORMAL
PATH REPORT.RELEVANT HX SPEC: NORMAL
PHOTO IMAGE: NORMAL

## 2021-08-17 PROCEDURE — 88305 TISSUE EXAM BY PATHOLOGIST: CPT | Mod: 26 | Performed by: PATHOLOGY

## 2021-08-26 ENCOUNTER — TELEPHONE (OUTPATIENT)
Dept: ORTHOPEDICS | Facility: OTHER | Age: 56
End: 2021-08-26

## 2021-08-26 ENCOUNTER — OFFICE VISIT (OUTPATIENT)
Dept: ORTHOPEDICS | Facility: CLINIC | Age: 56
End: 2021-08-26
Payer: COMMERCIAL

## 2021-08-26 VITALS
WEIGHT: 155 LBS | HEIGHT: 61 IN | SYSTOLIC BLOOD PRESSURE: 134 MMHG | BODY MASS INDEX: 29.27 KG/M2 | DIASTOLIC BLOOD PRESSURE: 90 MMHG

## 2021-08-26 DIAGNOSIS — Z01.818 PREOP GENERAL PHYSICAL EXAM: ICD-10-CM

## 2021-08-26 DIAGNOSIS — M17.12 PRIMARY OSTEOARTHRITIS OF LEFT KNEE: Primary | ICD-10-CM

## 2021-08-26 DIAGNOSIS — Z01.818 PREOPERATIVE EXAMINATION: ICD-10-CM

## 2021-08-26 PROCEDURE — 99214 OFFICE O/P EST MOD 30 MIN: CPT | Performed by: ORTHOPAEDIC SURGERY

## 2021-08-26 ASSESSMENT — KOOS JR
STANDING UPRIGHT: EXTREME
STRAIGHTENING KNEE FULLY: EXTREME
TWISING OR PIVOTING ON KNEE: EXTREME
BENDING TO THE FLOOR TO PICK UP OBJECT: EXTREME
RISING FROM SITTING: EXTREME
HOW SEVERE IS YOUR KNEE STIFFNESS AFTER FIRST WAKING IN MORNING: SEVERE
GOING UP OR DOWN STAIRS: EXTREME
KOOS JR SCORING: 8.29

## 2021-08-26 ASSESSMENT — PAIN SCALES - GENERAL: PAINLEVEL: SEVERE PAIN (7)

## 2021-08-26 ASSESSMENT — MIFFLIN-ST. JEOR: SCORE: 1230.46

## 2021-08-26 NOTE — TELEPHONE ENCOUNTER
Type of surgery: left total knee replacement    Location of surgery: [facility]  Date and time of surgery: 9/28  Surgeon: Simba  Pre-Op Appt Date: 9/16  Post-Op Appt Date: 10/11   Packet sent out: Yes  Pre-cert/Authorization completed:  Not Applicable  Date: na

## 2021-08-26 NOTE — LETTER
8/26/2021         RE: Elisa Al  [address] [address] [id number]        Dear Colleague,    Thank you for referring your patient, Elisa Al, to the [facility]. Please see a copy of my visit note below.    Office Visit-Follow up    Chief Complaint: Elisa Al is a 56 year old female who is being seen for   Chief Complaint   Patient presents with     RECHECK      left knee primary osteoarthritis       History of Present Illness:   Today's visit:  On her last visit she received an intra-articular steroid injection.  To date she has attempted injections, Tylenol, icing, compression sleeve, rest, elevation, activity modifications.  She avoids anti-inflammatories due to an allergy.  Injection provided incomplete relief for couple months.  Rates the pain is moderate to severe.  Worse with activity although has pain at rest.  Pain causes her to wake up.  Pain causes her limp.  Associated with swelling.  Nonradiating generalized knee pain.    5/24/21 office visit:  Returns to clinic. States the injection lasted about 4-5 months. States did do well during that time. However, overall the knee is getting worse. Feels like it could give out and fears of falling.  Increasing swelling. Pain is same as previous just more intense. No new injuries.  Thinking knee replacement in the fall.   8/3/20 visit  Returns to clinic. States got a good 10 months of total relief from the injection. Worked very well.  Would like this repeated today. The pain returned about 1 month ago. Same pain as previous only more intense. No new injuries.  Unable to take NSAIDs due to allergy.   9/4/19 visit  Elisa Al is a 54 year old female who is seen in consultation at the request of ADAM Mojica for evaluation of left knee pain.  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  Since then she has  "had continued anterior, lateral, and medial knee pain. Non-radiating.  Constant aching.  Mild to moderate depending on pain.  Worst with bending, kneeling, putting pressure on the knee, twisting, working on farm. Better with tylenol, icing, 's compression sleeve.    Treatments tried:  tylenol, icing, 's compression sleeve., rest, elevation, activity modification. Cannot take NSAIDs due to allergy.   Also notes previous issues with spine, but denies any current radicular symptoms states this feels different than previous back issues. Denies diabetes hx. States no previous problems with previous back steroid injections.        Social History     Occupational History     Employer: [employer]   Tobacco Use     Smoking status: Current Every Day Smoker     Packs/day: 0.25     Years: 23.00     Pack years: 5.75     Smokeless tobacco: Former User     Quit date: 1/19/2013   Substance and Sexual Activity     Alcohol use: Yes     Alcohol/week: 0.0 standard drinks     Comment: nightly     Drug use: No     Sexual activity: Yes     Partners: Male       REVIEW OF SYSTEMS  General: negative for, night sweats, dizziness, fatigue  Resp: No shortness of breath and no cough  CV: negative for chest pain, syncope or near-syncope  GI: negative for nausea, vomiting and diarrhea  : negative for dysuria and hematuria  Musculoskeletal: as above  Neurologic: negative for syncope   Hematologic: negative for bleeding disorder    Physical Exam:  Vitals: BP (!) 134/90   Ht 1.549 m (5' 1\")   Wt 70.3 kg (155 lb)   LMP 01/13/2004   BMI 29.29 kg/m    BMI= Body mass index is 29.29 kg/m .  Constitutional: healthy, alert and no acute distress   Psychiatric: mentation appears normal and affect normal/bright  NEURO: no focal deficits  RESP: Normal with easy respirations and no use of accessory muscles to breathe, no audible wheezing or retractions  CV: LLE:  no edema         Regular rate and rhythm by palpation  SKIN: No erythema, " rashes, excoriation, or breakdown. No evidence of infection.   JOINT/EXTREMITIES:left knee: Varus alignment.  Moderate effusion.  Tenderness along medial joint line.  Pseudolaxity medial valgus stressing although collateral ligaments are intact.  Full active range of motion.  Patella tracks midline.  No evidence of any bursal or soft tissue swelling  GAIT: not tested             Diagnostic Modalities:  Previous imaging reviewed.  Independent visualization of the images was performed.      Impression: left knee primary osteoarthritis    Plan:  All of the above pertinent physical exam and imaging modalities findings was reviewed with Elisa.    The patient has attempted conservative treatments that include Tylenol, steroid injections, activity modifications, bracing , rest yet still continues to have significant issues. These issues include pain at rest, increased pain with activity, pain that wakes at night, gait disturbances. Secondary to these reasons I have offered surgery in the form of left total knee replacement.    Risks, benefits, complications, alternatives, limitations, and post operative course were discussed. Risks including infection (requiring long-term antibiotics and repeat surgeries), implant loosening (requiring revision surgery), heart attacks, strokes, bleeding (possibly requiring blood transfusion), scars, instability, numbness around the knee, stiffness (requiring manipulations or repeat surgeries), instability and dislocations, fractures, blood clots the legs and lungs, nerve injury (possibly leading to foot drop).  Postoperative course was discussed as far as limitations and expected recovery times. It was also discussed that after surgery there is a need for blood thinners to prevent blood clots, but even when being treated it is possible to develop a blood clot. Anticipate being hospitalized 1 days and subsequently either discharged home or to a rehabilitation facility. Determinations of this  will be based on progress with physical therapy while in the hospital. The importance of post-operative physical therapy was discussed. If discharge home from the hospital expect [facility] home physical therapy for about 2 weeks and then transition to going to a physical therapy location for more aggressive therapy. Without physical therapy, outcomes may not be optimal. All questions were answered. The patient agrees to proceed with surgery.  Past medical and surgical history was reviewed. It is positive for hypothyroidism, depression. Ms. Al will need a pre-operative medical evaluation and clearance by a primary care provider. We will obtain a CBC as well as the appropriate radiographs prior to surgery. I will leave it to the discretion of the primary care provider for additional pre-operative testing.     Allergy to anti-inflammatories.  Will use Xarelto for DVT prophylaxis.  Anticipate possible same-day versus next day discharge.    Return to clinic 14 days post-operatively. , or sooner as needed for changes.  Re-x-ray on return: Yes.    YEE Carballo to follow up with Primary Care provider regarding elevated blood pressure.  Pippa/OSKAR       Again, thank you for allowing me to participate in the care of your patient.        Sincerely,        Sahil Cottrell, DO

## 2021-08-26 NOTE — PROGRESS NOTES
Office Visit-Follow up    Chief Complaint: Elisa Al is a 56 year old female who is being seen for   Chief Complaint   Patient presents with     RECHECK      left knee primary osteoarthritis       History of Present Illness:   Today's visit:  On her last visit she received an intra-articular steroid injection.  To date she has attempted injections, Tylenol, icing, compression sleeve, rest, elevation, activity modifications.  She avoids anti-inflammatories due to an allergy.  Injection provided incomplete relief for couple months.  Rates the pain is moderate to severe.  Worse with activity although has pain at rest.  Pain causes her to wake up.  Pain causes her limp.  Associated with swelling.  Nonradiating generalized knee pain.    5/24/21 office visit:  Returns to clinic. States the injection lasted about 4-5 months. States did do well during that time. However, overall the knee is getting worse. Feels like it could give out and fears of falling.  Increasing swelling. Pain is same as previous just more intense. No new injuries.  Thinking knee replacement in the fall.   8/3/20 visit  Returns to clinic. States got a good 10 months of total relief from the injection. Worked very well.  Would like this repeated today. The pain returned about 1 month ago. Same pain as previous only more intense. No new injuries.  Unable to take NSAIDs due to allergy.   9/4/19 visit  Elisa Al is a 54 year old female who is seen in consultation at the request of ADAM Mojica for evaluation of left knee pain.  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  Since then she has had continued anterior, lateral, and medial knee pain. Non-radiating.  Constant aching.  Mild to moderate depending on pain.  Worst with bending, kneeling, putting pressure on the knee, twisting, working on farm. Better with tylenol, icing, 's  "compression sleeve.    Treatments tried:  tylenol, icing, 's compression sleeve., rest, elevation, activity modification. Cannot take NSAIDs due to allergy.   Also notes previous issues with spine, but denies any current radicular symptoms states this feels different than previous back issues. Denies diabetes hx. States no previous problems with previous back steroid injections.        Social History     Occupational History     Employer: [employer]   Tobacco Use     Smoking status: Current Every Day Smoker     Packs/day: 0.25     Years: 23.00     Pack years: 5.75     Smokeless tobacco: Former User     Quit date: 1/19/2013   Substance and Sexual Activity     Alcohol use: Yes     Alcohol/week: 0.0 standard drinks     Comment: nightly     Drug use: No     Sexual activity: Yes     Partners: Male       REVIEW OF SYSTEMS  General: negative for, night sweats, dizziness, fatigue  Resp: No shortness of breath and no cough  CV: negative for chest pain, syncope or near-syncope  GI: negative for nausea, vomiting and diarrhea  : negative for dysuria and hematuria  Musculoskeletal: as above  Neurologic: negative for syncope   Hematologic: negative for bleeding disorder    Physical Exam:  Vitals: BP (!) 134/90   Ht 1.549 m (5' 1\")   Wt 70.3 kg (155 lb)   LMP 01/13/2004   BMI 29.29 kg/m    BMI= Body mass index is 29.29 kg/m .  Constitutional: healthy, alert and no acute distress   Psychiatric: mentation appears normal and affect normal/bright  NEURO: no focal deficits  RESP: Normal with easy respirations and no use of accessory muscles to breathe, no audible wheezing or retractions  CV: LLE:  no edema         Regular rate and rhythm by palpation  SKIN: No erythema, rashes, excoriation, or breakdown. No evidence of infection.   JOINT/EXTREMITIES:left knee: Varus alignment.  Moderate effusion.  Tenderness along medial joint line.  Pseudolaxity medial valgus stressing although collateral ligaments are intact.  " Full active range of motion.  Patella tracks midline.  No evidence of any bursal or soft tissue swelling  GAIT: not tested             Diagnostic Modalities:  Previous imaging reviewed.  Independent visualization of the images was performed.      Impression: left knee primary osteoarthritis    Plan:  All of the above pertinent physical exam and imaging modalities findings was reviewed with Elisa.    The patient has attempted conservative treatments that include Tylenol, steroid injections, activity modifications, bracing , rest yet still continues to have significant issues. These issues include pain at rest, increased pain with activity, pain that wakes at night, gait disturbances. Secondary to these reasons I have offered surgery in the form of left total knee replacement.    Risks, benefits, complications, alternatives, limitations, and post operative course were discussed. Risks including infection (requiring long-term antibiotics and repeat surgeries), implant loosening (requiring revision surgery), heart attacks, strokes, bleeding (possibly requiring blood transfusion), scars, instability, numbness around the knee, stiffness (requiring manipulations or repeat surgeries), instability and dislocations, fractures, blood clots the legs and lungs, nerve injury (possibly leading to foot drop).  Postoperative course was discussed as far as limitations and expected recovery times. It was also discussed that after surgery there is a need for blood thinners to prevent blood clots, but even when being treated it is possible to develop a blood clot. Anticipate being hospitalized 1 days and subsequently either discharged home or to a rehabilitation facility. Determinations of this will be based on progress with physical therapy while in the hospital. The importance of post-operative physical therapy was discussed. If discharge home from the hospital expect [facility] physical therapy for about 2 weeks and then  transition to going to a physical therapy location for more aggressive therapy. Without physical therapy, outcomes may not be optimal. All questions were answered. The patient agrees to proceed with surgery.  Past medical and surgical history was reviewed. It is positive for hypothyroidism, depression. Ms. Al will need a pre-operative medical evaluation and clearance by a primary care provider. We will obtain a CBC as well as the appropriate radiographs prior to surgery. I will leave it to the discretion of the primary care provider for additional pre-operative testing.     Allergy to anti-inflammatories.  Will use Xarelto for DVT prophylaxis.  Anticipate possible same-day versus next day discharge.    Return to clinic 14 days post-operatively. , or sooner as needed for changes.  Re-x-ray on return: Yes.    Champ Cottrell D.O.

## 2021-09-01 DIAGNOSIS — Z11.59 ENCOUNTER FOR SCREENING FOR OTHER VIRAL DISEASES: ICD-10-CM

## 2021-09-16 ENCOUNTER — OFFICE VISIT (OUTPATIENT)
Dept: FAMILY MEDICINE | Facility: CLINIC | Age: 56
End: 2021-09-16
Payer: COMMERCIAL

## 2021-09-16 VITALS
RESPIRATION RATE: 18 BRPM | OXYGEN SATURATION: 97 % | SYSTOLIC BLOOD PRESSURE: 122 MMHG | BODY MASS INDEX: 29.66 KG/M2 | WEIGHT: 157 LBS | DIASTOLIC BLOOD PRESSURE: 88 MMHG | HEART RATE: 114 BPM | TEMPERATURE: 97.5 F

## 2021-09-16 DIAGNOSIS — E03.9 HYPOTHYROIDISM, UNSPECIFIED TYPE: ICD-10-CM

## 2021-09-16 DIAGNOSIS — F32.5 MAJOR DEPRESSION IN COMPLETE REMISSION (H): ICD-10-CM

## 2021-09-16 DIAGNOSIS — M17.12 PRIMARY OSTEOARTHRITIS OF LEFT KNEE: ICD-10-CM

## 2021-09-16 DIAGNOSIS — Z01.818 PREOP GENERAL PHYSICAL EXAM: Primary | ICD-10-CM

## 2021-09-16 DIAGNOSIS — Z23 NEED FOR PROPHYLACTIC VACCINATION AND INOCULATION AGAINST INFLUENZA: ICD-10-CM

## 2021-09-16 LAB
BASOPHILS # BLD AUTO: 0 10E3/UL (ref 0–0.2)
BASOPHILS NFR BLD AUTO: 0 %
EOSINOPHIL # BLD AUTO: 0.2 10E3/UL (ref 0–0.7)
EOSINOPHIL NFR BLD AUTO: 2 %
ERYTHROCYTE [DISTWIDTH] IN BLOOD BY AUTOMATED COUNT: 12.2 % (ref 10–15)
HCT VFR BLD AUTO: 39.9 % (ref 35–47)
HGB BLD-MCNC: 13.6 G/DL (ref 11.7–15.7)
IMM GRANULOCYTES # BLD: 0 10E3/UL
IMM GRANULOCYTES NFR BLD: 0 %
LYMPHOCYTES # BLD AUTO: 2.2 10E3/UL (ref 0.8–5.3)
LYMPHOCYTES NFR BLD AUTO: 31 %
MCH RBC QN AUTO: 30.8 PG (ref 26.5–33)
MCHC RBC AUTO-ENTMCNC: 34.1 G/DL (ref 31.5–36.5)
MCV RBC AUTO: 90 FL (ref 78–100)
MONOCYTES # BLD AUTO: 0.5 10E3/UL (ref 0–1.3)
MONOCYTES NFR BLD AUTO: 7 %
NEUTROPHILS # BLD AUTO: 4.2 10E3/UL (ref 1.6–8.3)
NEUTROPHILS NFR BLD AUTO: 60 %
NRBC # BLD AUTO: 0 10E3/UL
NRBC BLD AUTO-RTO: 0 /100
PLATELET # BLD AUTO: 193 10E3/UL (ref 150–450)
RBC # BLD AUTO: 4.42 10E6/UL (ref 3.8–5.2)
WBC # BLD AUTO: 7.1 10E3/UL (ref 4–11)

## 2021-09-16 PROCEDURE — 36415 COLL VENOUS BLD VENIPUNCTURE: CPT | Performed by: NURSE PRACTITIONER

## 2021-09-16 PROCEDURE — 96127 BRIEF EMOTIONAL/BEHAV ASSMT: CPT | Performed by: NURSE PRACTITIONER

## 2021-09-16 PROCEDURE — 85025 COMPLETE CBC W/AUTO DIFF WBC: CPT | Performed by: NURSE PRACTITIONER

## 2021-09-16 PROCEDURE — 99214 OFFICE O/P EST MOD 30 MIN: CPT | Mod: 25 | Performed by: NURSE PRACTITIONER

## 2021-09-16 PROCEDURE — 90471 IMMUNIZATION ADMIN: CPT | Performed by: NURSE PRACTITIONER

## 2021-09-16 PROCEDURE — 90750 HZV VACC RECOMBINANT IM: CPT | Performed by: NURSE PRACTITIONER

## 2021-09-16 PROCEDURE — 90682 RIV4 VACC RECOMBINANT DNA IM: CPT | Performed by: NURSE PRACTITIONER

## 2021-09-16 PROCEDURE — 90472 IMMUNIZATION ADMIN EACH ADD: CPT | Performed by: NURSE PRACTITIONER

## 2021-09-16 RX ORDER — HYDROXYZINE HYDROCHLORIDE 25 MG/1
25 TABLET, FILM COATED ORAL DAILY PRN
Qty: 30 TABLET | Refills: 3 | Status: SHIPPED | OUTPATIENT
Start: 2021-09-16 | End: 2021-12-15

## 2021-09-16 ASSESSMENT — PAIN SCALES - GENERAL: PAINLEVEL: EXTREME PAIN (9)

## 2021-09-16 ASSESSMENT — PATIENT HEALTH QUESTIONNAIRE - PHQ9: SUM OF ALL RESPONSES TO PHQ QUESTIONS 1-9: 7

## 2021-09-16 NOTE — PATIENT INSTRUCTIONS
Take all meds the morning of surgery with a sip of water  Preparing for Your Surgery  Getting started  A nurse will call you to review your health history and instructions. They will give you an arrival time based on your scheduled surgery time.  Please be ready to share the following:    Your doctor's clinic name and phone number    Your medical, surgical and anesthesia history    A list of allergies and sensitivities    A list of medicines, including herbal treatments and over-the-counter drugs    Whether the patient has a legal guardian (ask how to send us the papers in advance)  If you have a child who's having surgery, please ask for a copy of Preparing for Your Child's Surgery.    Preparing for surgery    Within 30 days of surgery: Have a pre-op exam (sometimes called an H&P, or History and Physical). This can be done at a clinic or pre-operative center.  ? If you're having a , you may not need this exam. Talk to your care team    At your pre-op exam, talk to your care team about all medicines you take. If you need to stop any medicines before surgery, ask when to start taking them again.  ? We do this for your safety. Many medicines can make you bleed too much during surgery. Some change how well surgery (anesthesia) drugs work.    Call your insurance company to let them know you're having surgery. (If you don't have insurance, call [phone].)    Call your clinic if there's any change in your health. This includes signs of a cold or flu (sore throat, runny nose, cough, rash, fever). It also includes a scrape or scratch near the surgery site.    If you have questions on the day of surgery, call your hospital or surgery center.  Eating and drinking guidelines  For your safety: Unless your surgeon tells you otherwise, follow the guidelines below.    Eat and drink as usual until 8 hours before surgery. After that, no food or milk.    Drink clear liquids until 2 hours before surgery. These are liquids  you can see through, like water, Gatorade and Propel Water. You may also have black coffee and tea (no cream or milk).    Nothing by mouth within 2 hours of surgery. This includes gum, candy and breath mints.    If you drink, stop drinking alcohol the night before surgery.    If your care team tells you to take medicine on the morning of surgery, it's okay to take it with a sip of water.  Preventing infection    Shower or bathe the night before and morning of your surgery. Follow the instructions your clinic gave you. (If no instructions, use regular soap.)    Don't shave or clip hair near your surgery site. We'll remove the hair if needed.    Don't smoke or vape the morning of surgery. You may chew nicotine gum up to 2 hours before surgery. A nicotine patch is okay.  ? Note: Some surgeries require you to completely quit smoking and nicotine. Check with your surgeon.    Your care team will make every effort to keep you safe from infection. We will:  ? Clean our hands often with soap and water (or an alcohol-based hand rub).  ? Clean the skin at your surgery site with a special soap that kills germs.  ? Give you a special gown to keep you warm. (Cold raises the risk of infection.)  ? Wear special hair covers, masks, gowns and gloves during surgery.  ? Give antibiotic medicine, if prescribed. Not all surgeries need antibiotics.  What to bring on the day of surgery    Photo ID and insurance card    Copy of your health care directive, if you have one    Glasses and hearing aides (bring cases)  ? You can't wear contacts during surgery    Inhaler and eye drops, if you use them (tell us about these when you arrive)    CPAP machine or breathing device, if you use them    A few personal items, if spending the night    If you have . . .  ? A pacemaker or ICD (cardiac defibrillator): Bring the ID card.  ? An implanted stimulator: Bring the remote control.  ? A legal guardian: Bring a copy of the certified (court-stamped)  guardianship papers.  Please remove any jewelry, including body piercings. Leave jewelry and other valuables at home.  If you're going home the day of surgery  Important: If you don't follow the rules below, we must cancel your surgery.     Arrange for someone to drive you home after surgery. You may not drive, take a taxi or take public transportation by yourself (unless you'll have local anesthesia only).    Arrange for a responsible adult to stay with you overnight. If you don't, we may keep you in the hospital overnight, and you may need to pay the costs yourself.  Questions?   If you have any questions for your care team, list them here: _________________________________________________________________________________________________________________________________________________________________________________________________________________________________________________________________________________________________________________________  For informational purposes only. Not to replace the advice of your health care provider. Copyright   2003, 2019 [employer]. All rights reserved. Clinically reviewed by Lorelei Bell MD. [employer] 034971 - REV 4/20.

## 2021-09-16 NOTE — PROGRESS NOTES
Prior to immunization administration, verified patients identity using patient s name and date of birth. Please see Immunization Activity for additional information.     Screening Questionnaire for Adult Immunization    Are you sick today?   No   Do you have allergies to medications, food, a vaccine component or latex?   No   Have you ever had a serious reaction after receiving a vaccination?   No   Do you have a long-term health problem with heart, lung, kidney, or metabolic disease (e.g., diabetes), asthma, a blood disorder, no spleen, complement component deficiency, a cochlear implant, or a spinal fluid leak?  Are you on long-term aspirin therapy?   No   Do you have cancer, leukemia, HIV/AIDS, or any other immune system problem?   No   Do you have a parent, brother, or sister with an immune system problem?   No   In the past 3 months, have you taken medications that affect  your immune system, such as prednisone, other steroids, or anticancer drugs; drugs for the treatment of rheumatoid arthritis, Crohn s disease, or psoriasis; or have you had radiation treatments?   No   Have you had a seizure, or a brain or other nervous system problem?   No   During the past year, have you received a transfusion of blood or blood    products, or been given immune (gamma) globulin or antiviral drug?   No   For women: Are you pregnant or is there a chance you could become       pregnant during the next month?   No   Have you received any vaccinations in the past 4 weeks?   No     Immunization questionnaire answers were all negative.        Per orders of Kylie Coffman , injection of flu and Shingrix  given by Kyung Garcia MA. Patient instructed to remain in clinic for 15 minutes afterwards, and to report any adverse reaction to me immediately.       Screening performed by Kyung Garcia MA on 9/16/2021 at 11:08 AM.

## 2021-09-16 NOTE — Clinical Note
"Hi Elia- she wants general not spinal (has had fusion and spinal stimulator and does not want more \"pokes in spine\"- not sure if that is an option. Kylie Coffman, CNP "

## 2021-09-16 NOTE — PROGRESS NOTES
[address] [address]  Phone: [phone]  Fax: [phone]  Primary Provider: Anish Elizabeth [place]  Pre-op Performing Provider: ALEXA GUSMAN      PREOPERATIVE EVALUATION:  Today's date: 9/16/2021    Elisa Al is a 56 year old female who presents for a preoperative evaluation.    Surgical Information:  Surgery/Procedure: left Total knee replacement   Surgery Location: [facility]  Surgeon: Dr. Cottrell   Surgery Date: 9/28  Time of Surgery: 10:45  Where patient plans to recover: At home with family  Fax number for surgical facility: Note does not need to be faxed, will be available electronically in Epic.    Type of Anesthesia Anticipated: Spinal    Assessment & Plan     The proposed surgical procedure is considered LOW risk.    Preop general physical exam  Does not want spinal anesthesia as has had multiple back surgeries and has a back stimulator- will send to Ortho    Major depression in complete remission (H)  PHQ-9 is 7, has struggled more with recent loss of pets, covid 19, unable to walk and pain with her knee.  She denies suicidal ideation.  Does not want to adjust meds today.  Discussed good self care, sleep hygiene, mindfulness therapies and meditation.  - hydrOXYzine (ATARAX) 25 MG tablet; Take 1 tablet (25 mg) by mouth daily as needed for itching    Primary osteoarthritis of left knee  Going for replacement    Hypothyroidism, unspecified type  Controlled.  Stable- last 6/21           Risks and Recommendations:  The patient has the following additional risks and recommendations for perioperative complications:   - No identified additional risk factors other than previously addressed    Medication Instructions:  Patient is to take all scheduled medications on the day of surgery    RECOMMENDATION:  APPROVAL GIVEN to proceed with proposed procedure pending review of diagnostic evaluation.        35 minutes spent on the date of the encounter  doing chart review, review of test results, interpretation of tests, patient visit and documentation         Subjective     HPI related to upcoming procedure: left knee pain    Preop Questions 9/16/2021   1. Have you ever had a heart attack or stroke? No   2. Have you ever had surgery on your heart or blood vessels, such as a stent placement, a coronary artery bypass, or surgery on an artery in your head, neck, heart, or legs? No   3. Do you have chest pain with activity? No   4. Do you have a history of  heart failure? No   5. Do you currently have a cold, bronchitis or symptoms of other infection? No   6. Do you have a cough, shortness of breath, or wheezing? No   7. Do you or anyone in your family have previous history of blood clots? No   8. Do you or does anyone in your family have a serious bleeding problem such as prolonged bleeding following surgeries or cuts? No   9. Have you ever had problems with anemia or been told to take iron pills? No   10. Have you had any abnormal blood loss such as black, tarry or bloody stools, or abnormal vaginal bleeding? No   11. Have you ever had a blood transfusion? No   12. Are you willing to have a blood transfusion if it is medically needed before, during, or after your surgery? Yes   13. Have you or any of your relatives ever had problems with anesthesia? No   14. Do you have sleep apnea, excessive snoring or daytime drowsiness? No   15. Do you have any artifical heart valves or other implanted medical devices like a pacemaker, defibrillator, or continuous glucose monitor? No   16. Do you have artificial joints? none   17. Are you allergic to latex? No   18. Is there any chance that you may be pregnant? No       Health Care Directive:  Patient does not have a Health Care Directive or Living Will: Discussed advance care planning with patient; however, patient declined at this time.    Preoperative Review of :   reviewed - no record of controlled substances  prescribed.      Status of Chronic Conditions:  DEPRESSION - Patient has a long history of Depression of moderate severity requiring medication for control with recent symptoms being stable..Current symptoms of depression include worse with pain and immobility.  Has lost dog, cat, horse this year.  Denies suicidal ideation    HYPOTHYROIDISM - Patient has a longstanding history of chronic Hypothyroidism. Patient has been doing well, noting no tremor, insomnia, hair loss or changes in skin texture. Continues to take medications as directed, without adverse reactions or side effects. Last TSH   Lab Results   Component Value Date    TSH 1.70 06/28/2021   .        Review of Systems  CONSTITUTIONAL: NEGATIVE for fever, chills, change in weight  INTEGUMENTARY/SKIN: NEGATIVE for worrisome rashes, moles or lesions  EYES: NEGATIVE for vision changes or irritation  ENT/MOUTH: NEGATIVE for ear, mouth and throat problems  RESP: NEGATIVE for significant cough or SOB  CV: NEGATIVE for chest pain, palpitations or peripheral edema  GI: NEGATIVE for nausea, abdominal pain, heartburn, or change in bowel habits  : NEGATIVE for frequency, dysuria, or hematuria  MUSCULOSKELETAL: NEGATIVE for significant arthralgias or myalgia  NEURO: NEGATIVE for weakness, dizziness or paresthesias  ENDOCRINE: NEGATIVE for temperature intolerance, skin/hair changes  HEME: NEGATIVE for bleeding problems  PSYCHIATRIC: NEGATIVE for changes in mood or affect    Patient Active Problem List    Diagnosis Date Noted     Peroneal tendonitis, left 04/13/2020     Priority: Medium     Primary osteoarthritis of left knee 09/04/2019     Priority: Medium     Hypothyroidism, unspecified type 02/21/2018     Priority: Medium     Major depression in complete remission (H) 02/21/2018     Priority: Medium     CARDIOVASCULAR SCREENING; LDL GOAL LESS THAN 160 10/31/2010     Priority: Medium     Pain in joint, shoulder region 12/05/2006     Priority: Medium      Past Medical  History:   Diagnosis Date     Anxiety      Benign neoplasm of other specified sites of skin 4/6/2006    Finger - right third finger proximal palmar surface     Degenerative disk disease 12/18/12    anterior exposure of L4-L5 and L5-S1 disk spaces for fusion     Depressive disorder      Gout, unspecified      Other and unspecified ovarian cyst      Unspecified hypothyroidism years     Unspecified intestinal obstruction 02/02/2005    Partial bowel obstruction, Obstipation.     Past Surgical History:   Procedure Laterality Date     C LAP,ABD/PERIT/OMENTUM,UNLIST  04/12/10    pelvic adhesion     C TOTAL ABDOM HYSTERECTOMY  09/08/2004    TAHRSO, Cystoscopy.     CARPAL TUNNEL RELEASE RT/LT  2005    right wrist     COLONOSCOPY N/A 8/16/2021    Procedure: COLONOSCOPY, WITH POLYPECTOMY;  Surgeon: Celso Shipley MD;  Location: [facility]     EXPLORE SPINE, REMOVE HARDWARE, COMBINED  4/7/2014    Procedure: COMBINED EXPLORE SPINE, REMOVE HARDWARE;  Hardware Removal Lumbar 4-Sacral 1, Exploration of Fusion;  Surgeon: Brenton Ashford MD;  Location: [facility]     FUSION LUMBAR ANTERIOR THREE+ LEVELS       HC EXCISION TENDON SHEATH LESION, HAND/FINGR  04/17/2006    Right middle finger flexor sheath MP joint.     HC EXCISION TENDON SHEATH LESION, HAND/FINGR  08/07/06    Right middle finger     HC LAPAROSCOPY, SURGICAL; CHOLECYSTECTOMY  4/12/2004    Cholecystectomy, Laparoscopic     HC REMOVAL OF OVARY/TUBE(S)      left ovary removed secondary to cyst     HC REMOVAL OF TONSILS,12+ Y/O      Tonsils 12+y.o.     HC REVISE MEDIAN N/CARPAL TUNNEL SURG  8/22/2005    Left     [facility] COLONOSCOPY W BIOPSY  04/26/10     [facility] COLONOSCOPY W/WO BRUSH/WASH  5/25/2005     [facility] UGI ENDOSCOPY, SIMPLE EXAM  5/25/2005     Current Outpatient Medications   Medication Sig Dispense Refill     hydrOXYzine (ATARAX) 25 MG tablet Take 1 tablet (25 mg) by mouth daily as needed for itching 30 tablet 3     levothyroxine (SYNTHROID/LEVOTHROID) 100 MCG tablet  Take 1 tablet (100 mcg) by mouth daily Appointment needed for additional refills. 90 tablet 3     rosuvastatin (CRESTOR) 5 MG tablet Take 1 tablet (5 mg) by mouth daily 30 tablet 3     sertraline (ZOLOFT) 100 MG tablet TAKE ONE TABLET BY MOUTH ONCE DAILY 90 tablet 3     sertraline (ZOLOFT) 50 MG tablet TAKE ONE TABLET BY MOUTH ONCE DAILY 90 tablet 3     pseudoePHEDrine (SUDAFED) 120 MG 12 hr tablet Take 120 mg by mouth daily as needed for congestion (Patient not taking: Reported on 9/16/2021)       TYLENOL EXTRA STRENGTH 500 MG OR TABS 2 tablets daily PRN (Patient not taking: No sig reported) 60 0       Allergies   Allergen Reactions     Aspirin Swelling     Erythromycin Ethylsuccinate GI Disturbance     gi upset     Ibuprofen Swelling     Naproxen      Nsaids Swelling     Zelnorm [Tegaserod Maleate]         Social History     Tobacco Use     Smoking status: Current Every Day Smoker     Packs/day: 0.25     Years: 23.00     Pack years: 5.75     Smokeless tobacco: Former User     Quit date: 1/19/2013   Substance Use Topics     Alcohol use: Yes     Alcohol/week: 0.0 standard drinks     Comment: nightly     Family History   Problem Relation Age of Onset     Blood Disease Mother         leukemia-passed 2008     Cardiovascular Father         triple bypass     Respiratory Father         Black Lung     Alzheimer Disease Father      Cancer Maternal Grandmother         ovarian/brain ca     History   Drug Use No         Objective     /88   Pulse 114   Temp 97.5  F (36.4  C) (Temporal)   Resp 18   Wt 71.2 kg (157 lb)   LMP 01/13/2004   SpO2 97%   BMI 29.66 kg/m      Physical Exam    GENERAL APPEARANCE: healthy, alert and no distress     EYES: EOMI, PERRL     HENT: ear canals and TM's normal and nose and mouth without ulcers or lesions     NECK: no adenopathy, no asymmetry, masses, or scars and thyroid normal to palpation     RESP: lungs clear to auscultation - no rales, rhonchi or wheezes     CV: regular rates and  rhythm, normal S1 S2, no S3 or S4 and no murmur, click or rub     ABDOMEN:  soft, nontender, no HSM or masses and bowel sounds normal     MS: extremities normal- no gross deformities noted, no evidence of inflammation in joints, FROM in all extremities.     SKIN: no suspicious lesions or rashes     NEURO: Normal strength and tone, sensory exam grossly normal, mentation intact and speech normal     PSYCH: mentation appears normal. and affect normal/bright     LYMPHATICS: No cervical adenopathy    Recent Labs   Lab Test 06/28/21  0753 10/04/19  1125   HGB  --  14.6   PLT  --  231    138   POTASSIUM 3.9 3.9   CR 0.70 0.60        Diagnostics:  Recent Results (from the past 24 hour(s))   CBC with platelets and differential    Collection Time: 09/16/21 11:20 AM   Result Value Ref Range    WBC Count 7.1 4.0 - 11.0 10e3/uL    RBC Count 4.42 3.80 - 5.20 10e6/uL    Hemoglobin 13.6 11.7 - 15.7 g/dL    Hematocrit 39.9 35.0 - 47.0 %    MCV 90 78 - 100 fL    MCH 30.8 26.5 - 33.0 pg    MCHC 34.1 31.5 - 36.5 g/dL    RDW 12.2 10.0 - 15.0 %    Platelet Count 193 150 - 450 10e3/uL    % Neutrophils 60 %    % Lymphocytes 31 %    % Monocytes 7 %    % Eosinophils 2 %    % Basophils 0 %    % Immature Granulocytes 0 %    NRBCs per 100 WBC 0 <1 /100    Absolute Neutrophils 4.2 1.6 - 8.3 10e3/uL    Absolute Lymphocytes 2.2 0.8 - 5.3 10e3/uL    Absolute Monocytes 0.5 0.0 - 1.3 10e3/uL    Absolute Eosinophils 0.2 0.0 - 0.7 10e3/uL    Absolute Basophils 0.0 0.0 - 0.2 10e3/uL    Absolute Immature Granulocytes 0.0 <=0.0 10e3/uL    Absolute NRBCs 0.0 10e3/uL      No EKG required for low risk surgery (cataract, skin procedure, breast biopsy, etc).    Revised Cardiac Risk Index (RCRI):  The patient has the following serious cardiovascular risks for perioperative complications:   - No serious cardiac risks = 0 points     RCRI Interpretation: 0 points: Class I (very low risk - 0.4% complication rate)           Signed Electronically by: Kylie  Lulu Coffman, CASSY  Copy of this evaluation report is provided to requesting physician.

## 2021-09-21 NOTE — TELEPHONE ENCOUNTER
Patient is calling because she got a call from her insurance company stating that she will not be covered for the surgery due to the location being at [facility]. They told her that Dr. Cottrell is covered, but the hospital isn't covered? Is there anything she can do to be able to change this? Sent her to the priceline to be able to ask them questions as well but please call her at  [phone]

## 2021-09-22 ENCOUNTER — OFFICE VISIT (OUTPATIENT)
Dept: ORTHOPEDICS | Facility: CLINIC | Age: 56
End: 2021-09-22
Payer: COMMERCIAL

## 2021-09-22 VITALS
HEIGHT: 61 IN | BODY MASS INDEX: 29.64 KG/M2 | WEIGHT: 157 LBS | DIASTOLIC BLOOD PRESSURE: 90 MMHG | SYSTOLIC BLOOD PRESSURE: 124 MMHG

## 2021-09-22 DIAGNOSIS — M17.12 PRIMARY OSTEOARTHRITIS OF LEFT KNEE: Primary | ICD-10-CM

## 2021-09-22 PROCEDURE — 99207 PR PREOP VISIT IN GLOBAL PKG: CPT | Performed by: NURSE PRACTITIONER

## 2021-09-22 ASSESSMENT — KOOS JR
HOW SEVERE IS YOUR KNEE STIFFNESS AFTER FIRST WAKING IN MORNING: EXTREME
GOING UP OR DOWN STAIRS: EXTREME
RISING FROM SITTING: EXTREME
STRAIGHTENING KNEE FULLY: EXTREME
KOOS JR SCORING: 0
TWISING OR PIVOTING ON KNEE: EXTREME
STANDING UPRIGHT: EXTREME
BENDING TO THE FLOOR TO PICK UP OBJECT: EXTREME

## 2021-09-22 ASSESSMENT — PAIN SCALES - GENERAL: PAINLEVEL: EXTREME PAIN (9)

## 2021-09-22 ASSESSMENT — MIFFLIN-ST. JEOR: SCORE: 1239.53

## 2021-09-22 NOTE — PROGRESS NOTES
1 week prior to total knee replacement.  Reviewed H&P and labs unremarkable.  Patient has a spinal cord stimulator and previous lumbar surgeries - she requests general anesthesia (no spinal). She will also bring the controller for spinal cord stimulator if it needs to be turned off.   Not on chronic narcotics.   Plain cement   Anticipate POD0 or 1 discharge.  Direct to outpatient physical therapy. Referral placed today  Xarelto/Eliquis for DVT prevention - allergy to NSAIDs.       Patient understands surgery may be cancelled due to hospital capacity.  Sent message for schedulers to re-schedule her COVID test to later in the day Friday.    FLORENCIA Crespo, CNP  Orthopedic Surgery

## 2021-09-22 NOTE — LETTER
9/22/2021         RE: Elisa Al  [address] [facility] [address]        Dear Colleague,    Thank you for referring your patient, Elisa Al, to the [facility]. Please see a copy of my visit note below.    Elisa to follow up with Primary Care provider regarding elevated blood pressure.  K.Kluge/RMA     1 week prior to total knee replacement.  Reviewed H&P and labs unremarkable.  Patient has a spinal cord stimulator and previous lumbar surgeries - she requests general anesthesia (no spinal). She will also bring the controller for spinal cord stimulator if it needs to be turned off.   Not on chronic narcotics.   Plain cement   Anticipate POD0 or 1 discharge.  Direct to outpatient physical therapy. Referral placed today  Xarelto/Eliquis for DVT prevention - allergy to NSAIDs.       Patient understands surgery may be cancelled due to hospital capacity.  Sent message for schedulers to re-schedule her COVID test to later in the day Friday.    FLORENCIA Crespo, CNP  Orthopedic Surgery          Again, thank you for allowing me to participate in the care of your patient.        Sincerely,        FLORENCIA Weinberg CNP

## 2021-09-23 ENCOUNTER — PATIENT OUTREACH (OUTPATIENT)
Dept: CARE COORDINATION | Facility: CLINIC | Age: 56
End: 2021-09-23

## 2021-09-23 DIAGNOSIS — Z71.89 COUNSELING AND COORDINATION OF CARE: Primary | ICD-10-CM

## 2021-09-23 ASSESSMENT — ACTIVITIES OF DAILY LIVING (ADL): DEPENDENT_IADLS:: INDEPENDENT

## 2021-09-23 NOTE — PROGRESS NOTES
Clinic Care Coordination Contact  OUTREACH    Referral Information: Ortho team requesting CC review/assessment to assess for any post discharge needs for patient prior to upcoming surgery.   Referral Source: Care Team    Primary Diagnosis: Orthopedic    Chief Complaint   Patient presents with     Clinic Care Coordination - Initial     Pre-op assessment- [facility]        Universal Utilization: No concerns.   Clinic Utilization  Difficulty keeping appointments:: No  Compliance Concerns: No  No-Show Concerns: No  No PCP office visit in Past Year: No  Utilization    Hospital Admissions  1             ED Visits  0             No Show Count (past year)  0                Current as of: 9/24/2021  7:30 AM            Clinical Concerns:  Patient Active Problem List   Diagnosis     Pain in joint, shoulder region     CARDIOVASCULAR SCREENING; LDL GOAL LESS THAN 160     Hypothyroidism, unspecified type     Major depression in complete remission (H)     Primary osteoarthritis of left knee     Peroneal tendonitis, left       Current Medical Concerns:  Patient has upcoming left total knee replacement surgery scheduled for next week. 9/28/21. Pre-op appt done. Covid-19 test scheduled. Pt is aware that due to hospital capacity/COVID- there is a chance surgery could be postponed.  Discussed patient needs post surgery. Plan is for her to go home with her . He will be present at surgery and plans to help her at home after. He will be taking a week off after surgery to be at home. He'll also ensure that patient gets to outpatient PT post surgery. Those appointments are set up for outpatient PT.     Current Behavioral Concerns: Pt does have depression dx and take anti-depressant medication. No other mental health concerns/needs at this time discussed. Pt stressed about finances. Pt tells [facility] recently found out that the surgery is going to be out of pocket. Is working with her insurance. Was told she'll have to pay $10,000 out of  pocket. Everything prior to surgery has been in network/covered so frustrated to learn the surgery itself with the surgeon/at this hospital is out of network.     Education Provided to patient: Discussed some resources with patient for financial help. Provided her with financial office/business office number to discuss [facility] care/option of payment plan. Pt was given website for [facility]rica care to pursue that see if eligible for help with bills. Did explain have to apply for/be denied for MA in order to be eligible for Jerrica care. Pt was very appreciative of the resources. She took down SW CC number should other questions/needs arise.   Pain  Pain (GOAL):: Yes  Type: Acute (<3mo)  Location of chronic pain:: back pain  Health Maintenance Reviewed: Due/Overdue   Health Maintenance Due   Topic Date Due     Pneumococcal Vaccine: Pediatrics (0 to 5 Years) and At-Risk Patients (6 to 64 Years) (1 of 2 - PPSV23) Never done     COVID-19 Vaccine (1) Never done     HIV SCREENING  Never done     Clinical Pathway: None    Medication Management:  Medications not reviewed. Pt went over medications at her appointment. Had no questions or concerns.     Functional Status:  Dependent ADLs:: Independent  Dependent IADLs:: Independent  Bed or wheelchair confined:: No  Mobility Status: Independent  Fallen 2 or more times in the past year?: No  Any fall with injury in the past year?: No    Living Situation:  Current living arrangement:: I live in a private home with spouse  [facility]    Lifestyle & Psychosocial Needs:    Social Determinants of Health     Tobacco Use: High Risk     Smoking Tobacco Use: Current Every Day Smoker     Smokeless Tobacco Use: Former User   Alcohol Use:      Frequency of Alcohol Consumption:      Average Number of Drinks:      Frequency of Binge Drinking:    Financial Resource Strain:      Difficulty of Paying Living Expenses:    Food Insecurity:      Worried About Running Out of  Food in the Last Year:      Ran Out of Food in the Last Year:    Transportation Needs:      Lack of Transportation (Medical):      Lack of Transportation (Non-Medical):    Physical Activity:      Days of Exercise per Week:      Minutes of Exercise per Session:    Stress:      Feeling of Stress :    Social Connections:      Frequency of Communication with Friends and Family:      Frequency of Social Gatherings with Friends and Family:      Attends Scientologist Services:      Active Member of Clubs or Organizations:      Attends Club or Organization Meetings:      Marital Status:    Intimate Partner Violence:      Fear of Current or Ex-Partner:      Emotionally Abused:      Physically Abused:      Sexually Abused:    Depression: At risk     PHQ-2 Score: 6   Housing Stability:      Unable to Pay for Housing in the Last Year:      Number of Places Lived in the Last Year:      Unstable Housing in the Last Year:      Diet:: Other (ED recommended liquid diet)  Inadequate nutrition (GOAL):: No  Tube Feeding: No  Inadequate activity/exercise (GOAL):: No  Significant changes in sleep pattern (GOAL): No  Transportation means:: Family, Regular car (family/spouse will drive post surgery)     Scientologist or spiritual beliefs that impact treatment:: No  Mental health DX:: Yes  Mental health DX how managed:: Medication  Mental health management concern (GOAL):: No  Chemical Dependency Status: No Current Concerns  Informal Support system:: Spouse      Resources and Interventions:  Community Resources: None  Supplies used at home:: None  Equipment Currently Used at Home: cane, straight, walker, standard, tub bench (has cane, walker to use if needed)  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        Future Appointments              Tomorrow [facility] COVID [facility]    In 2 weeks Chas Gaviria APRN CNP [facility]          Plan: Pt will look into possible MA and Jerrica Care for financial assistance with bills post surgery.   Pt plans to get COVID-19 test. Surgery scheduled for 9/28/21.   Pt declines any further CC needs at this time.   Pt has SW CC number should needs arise.   SW CC will do no further outreaches.        ALEX Cadena/RADHA Care Coordination Supervisor  [facility] - Care Coordination   9/23/2021 4:18 PM  [phone]

## 2021-09-25 ENCOUNTER — LAB (OUTPATIENT)
Dept: LAB | Facility: CLINIC | Age: 56
End: 2021-09-25
Attending: ORTHOPAEDIC SURGERY
Payer: COMMERCIAL

## 2021-09-25 DIAGNOSIS — Z11.59 ENCOUNTER FOR SCREENING FOR OTHER VIRAL DISEASES: ICD-10-CM

## 2021-09-25 PROCEDURE — U0003 INFECTIOUS AGENT DETECTION BY NUCLEIC ACID (DNA OR RNA); SEVERE ACUTE RESPIRATORY SYNDROME CORONAVIRUS 2 (SARS-COV-2) (CORONAVIRUS DISEASE [COVID-19]), AMPLIFIED PROBE TECHNIQUE, MAKING USE OF HIGH THROUGHPUT TECHNOLOGIES AS DESCRIBED BY CMS-2020-01-R: HCPCS

## 2021-09-25 PROCEDURE — U0005 INFEC AGEN DETEC AMPLI PROBE: HCPCS

## 2021-09-26 LAB — SARS-COV-2 RNA RESP QL NAA+PROBE: NEGATIVE

## 2021-09-27 RX ORDER — TRAMADOL HYDROCHLORIDE 50 MG/1
50 TABLET ORAL DAILY PRN
Qty: 15 TABLET | Refills: 0 | Status: SHIPPED | OUTPATIENT
Start: 2021-09-27 | End: 2021-10-13

## 2021-09-27 RX ORDER — CYCLOBENZAPRINE HCL 10 MG
10 TABLET ORAL 3 TIMES DAILY PRN
Qty: 40 TABLET | Refills: 1 | Status: ON HOLD | OUTPATIENT
Start: 2021-09-27 | End: 2022-02-11

## 2021-09-27 NOTE — TELEPHONE ENCOUNTER
Left message for return call    Surgery must be cancelled for tomorrow 9/29 due to full bed capacity.

## 2021-09-27 NOTE — TELEPHONE ENCOUNTER
Called and spoke with patient at length.     She is in significant pain.   Discussed options at length. She would like to try muscle relaxer.      She also asked about tramadol.  Discussed the risks, limitations, and use for this type of pain. Typically do not use a narcotic for arthritis type pain, however she is currently experiencing severe symptoms.      Will provide a small amount of tramadol to use sparingly.  She understands no further refills before surgery.     Risks of both muscle relaxer and tramadol discussed.  Do not mix with alcohol.  Would be willing to refill muscle relaxer prior to surgery again but not tramadol.     pmdp checked. No recent or previous narcotic Rx on record.      Both sent to pharmacy of choice.    FLORENCIA Crespo, CNP  Orthopedic Surgery

## 2021-09-27 NOTE — TELEPHONE ENCOUNTER
Patient informed surgery cancelled.  Tejal- please place in covid depot.    She would like to know what to do in the meantime, she is in a lot of pain. She would like to discuss options with Elia. Please call.    [facility]

## 2021-09-30 ENCOUNTER — TELEPHONE (OUTPATIENT)
Dept: ORTHOPEDICS | Facility: CLINIC | Age: 56
End: 2021-09-30

## 2021-09-30 NOTE — TELEPHONE ENCOUNTER
Reason for Call:  Other call back    Detailed comments: Patient states Elia is working on a referral to a surgeon in [place] and wondering what the name is?  She doesn't want to wait to have this surgery another month.  Please call    Phone Number Patient can be reached at: Home number on file [phone] (home) or Cell number on file:    Telephone Information:   Mobile [phone]       Best Time: any    Can we leave a detailed message on this number? YES    Call taken on 9/30/2021 at 4:35 PM by Ada Barron

## 2021-10-01 NOTE — TELEPHONE ENCOUNTER
Called and spoke with patient.  She understands that most other sites are under the same restrictions we are, and she would prefer to stay with [facility].  She was reassured that she is in the queue and does want the procedure here.     FLORENCIA Crespo, CNP  Orthopedic Surgery

## 2021-10-04 NOTE — TELEPHONE ENCOUNTER
Pt states she is going into state of depression because the postponement of the surgery- states Khadra had prescribed her a medication for 7 days and she is requesting refill

## 2021-10-05 NOTE — TELEPHONE ENCOUNTER
Attempted to contact patient.  LVM regarding message below.   Instructed to contact clinic with any questions.  Jodi Kirby RN on 10/5/2021 at 3:34 PM

## 2021-10-05 NOTE — TELEPHONE ENCOUNTER
I would recommend patient speak with her PCP about the situational depression.     If she is requesting a refill of the tramadol. I gave her 15 tabs to use sparingly and the Rx itself stated once daily as needed.  She would not appropriate to refill until after next week.  Also, narcotics would be a poor option if she is also having depression.  I would recommend the muscle relaxer, tylenol, and speak to her PCP about depression.      FLORENCIA Crespo, CNP  Orthopedic Surgery

## 2021-10-06 DIAGNOSIS — M17.12 PRIMARY OSTEOARTHRITIS OF LEFT KNEE: ICD-10-CM

## 2021-10-06 RX ORDER — TRAMADOL HYDROCHLORIDE 50 MG/1
50 TABLET ORAL DAILY PRN
Qty: 15 TABLET | Refills: 0 | Status: CANCELLED | OUTPATIENT
Start: 2021-10-06

## 2021-10-06 NOTE — TELEPHONE ENCOUNTER
Patient called again. See encounter from 10/6/21.  I will close this encounter as the encounter from today is the most recent.      Will ask nursing to reach out to the patient.     FLORENCIA Crespo, CNP  Orthopedic Surgery

## 2021-10-06 NOTE — TELEPHONE ENCOUNTER
Please see encounter from 8/26/21.      This was response:    I would recommend patient speak with her PCP about the situational depression.      If she is requesting a refill of the tramadol. I gave her 15 tabs to use sparingly and the Rx itself stated once daily as needed.  She would not appropriate to refill until after next week.  Also, narcotics would be a poor option if she is also having depression.  I would recommend the muscle relaxer, tylenol, and speak to her PCP about depression.       FLORENCIA Crespo, CNP  Orthopedic Surgery

## 2021-10-06 NOTE — TELEPHONE ENCOUNTER
Patient waiting on this prescription from Dr. Cottrell's office and hasn't received it yet. Please call into [facility] in [place] today if possible. Patient is in considerable amount of pain.

## 2021-10-06 NOTE — TELEPHONE ENCOUNTER
Tramadol       Last Written Prescription Date:  09/27/2021  Last Fill Quantity: 15,   # refills: 0  Last Office Visit: 09/16/2021  Future Office visit:       Routing refill request to provider for review/approval because:  Drug not on the [facility], P or [facility] refill protocol or controlled substance    Irena Thomson MA 10/6/2021  2:54 PM

## 2021-10-08 NOTE — TELEPHONE ENCOUNTER
Writer spoke with patient, she only received 7 tabs of tramadol from the pharmacy on 9/27 because of her insurance. When she went to get the rest, the pharmacy told her she didn't have a refill sent. Writer spoke with the pharmacy and they did verify that they made a mistake and she does have # 8 tabs available to . Patient will pick that up today. Patient states her depression is stable, she is already on a medication for that from her PCP and she is doing well on it. Patient stated she is frustrated because she is in pain and waiting to be able to have her surgery re-scheduled. Patient did state that the flexeril doesn't help and it also makes her feel sick. Please advise if you would like to try a different muscle relaxer. Patient stated she is fine to wait to hear until next week.  Claritza Mendez RN on 10/8/2021 at 10:01 AM

## 2021-10-12 NOTE — TELEPHONE ENCOUNTER
Thank you for the clarification. If the patient would like to try a different muscle relaxer will prescribe.    FLORENCIA Crespo, CNP  Orthopedic Surgery

## 2021-10-12 NOTE — TELEPHONE ENCOUNTER
Attempted to contact patient to discuss, no answer. LMTCB.     Does she want to try a different muscle relaxer?     If so, what pharmacy?     Rosetta COLBERT, Lead RN, BSN. . .  10/12/2021, 9:44 AM

## 2021-10-12 NOTE — TELEPHONE ENCOUNTER
Patient would like to try a different muscle relaxer and a refill of tramadol, she will be out on Friday and takes one pill daily.    Patient is also questioning when she can have her surgery rescheduled.    Jodi Kirby RN on 10/12/2021 at 2:23 PM

## 2021-10-13 ENCOUNTER — TELEPHONE (OUTPATIENT)
Dept: ORTHOPEDICS | Facility: CLINIC | Age: 56
End: 2021-10-13

## 2021-10-13 RX ORDER — TRAMADOL HYDROCHLORIDE 50 MG/1
50 TABLET ORAL DAILY PRN
Qty: 15 TABLET | Refills: 0 | Status: SHIPPED | OUTPATIENT
Start: 2021-10-13 | End: 2021-10-27

## 2021-10-13 NOTE — TELEPHONE ENCOUNTER
Checked against [facility].    Will refill tramadol. Take at most 1 tablet a day, use sparingly.  Will send Rx for tizanidine to try as an alternative to flexeril.     FLORENCIA Crespo, CNP  Orthopedic Surgery

## 2021-10-18 NOTE — TELEPHONE ENCOUNTER
Surgery rescheduled for 12/6/2021 Dr. Cottrell  ([place])    All other appts scheduled also.

## 2021-10-19 PROBLEM — F32.9 MAJOR DEPRESSION: Status: ACTIVE | Noted: 2018-02-21

## 2021-10-27 DIAGNOSIS — M17.12 PRIMARY OSTEOARTHRITIS OF LEFT KNEE: ICD-10-CM

## 2021-10-27 RX ORDER — TRAMADOL HYDROCHLORIDE 50 MG/1
50 TABLET ORAL DAILY PRN
Qty: 39 TABLET | Refills: 0 | Status: ON HOLD | OUTPATIENT
Start: 2021-10-27 | End: 2022-02-09

## 2021-10-27 NOTE — TELEPHONE ENCOUNTER
Chief Complaint   Patient presents with     Refill Request     ultram      Refill request received via fax by pharmacy   [facility] [address] [place] [address] - [address]       Pending Prescriptions:                       Disp   Refills    traMADol (ULTRAM) 50 MG tablet            15 tab*0            Sig: Take 1 tablet (50 mg) by mouth daily as needed           for severe pain         Last Written Prescription Date:  10/3/21  Last Fill Quantity: 15,   # refills: 0  Last Office Visit with prescribing provider: 9/22/21  Future Office visit: 12/20/21   Next 5 appointments (look out 90 days)    Nov 23, 2021  7:30 AM  Pre-Op physical with Kylie Coffman NP  [facility] ([facility] ) [address] [place] [id number] [phone]   Nov 26, 2021  8:40 AM  Pre-procedure Covid with PH COVID LAB  [facility] ([facility] ) [address] [place] [id number] [phone]   Dec 20, 2021  9:00 AM  Return Visit with FLORENCIA Montano CNP  [facility] ([facility] ) [address] [place] [id number] [phone]

## 2021-10-27 NOTE — TELEPHONE ENCOUNTER
Ultram  Last Written Prescription Date:  10/13/2021  Last Fill Quantity: 15,  # refills: 0   Last office visit: 9/22/2021 with prescribing provider:     Future Office Visit:   Next 5 appointments (look out 90 days)    Nov 23, 2021  7:30 AM  Pre-Op physical with Kylie Coffman NP  [facility] ([facility] ) [address] [place] [id number]  [phone]   Nov 26, 2021  8:40 AM  Pre-procedure Covid with PH COVID LAB  [facility] ([facility] ) [address] [place] [phone]  [phone]   Dec 20, 2021  9:00 AM  Return Visit with FLORENCIA Montano CNP  [facility] ([facility] ) [address] [place] [phone]  [phone]           Requested Prescriptions   Pending Prescriptions Disp Refills     traMADol (ULTRAM) 50 MG tablet 15 tablet 0     Sig: Take 1 tablet (50 mg) by mouth daily as needed for severe pain       There is no refill protocol information for this order          Patient reported 9/10 pain  Currently taking Ultram once daily, Tylenol PRN  Icing and elevating when possible  2 tablets remaining  Surgery scheduled for 12/6/2021    Jodi Kirby RN on 10/27/2021 at 9:14 AM

## 2021-10-27 NOTE — TELEPHONE ENCOUNTER
Attempted to contact patient via telephone. No answer.   LVM to return dori to clinic for triage.  Jodi Kirby RN on 10/27/2021 at 8:52 AM

## 2021-10-27 NOTE — TELEPHONE ENCOUNTER
Patient surgery is 12/6/21.     Will order 39 tablets.  This will be for one a day as needed until Surgery. Should not need further refills      Will provide further pain management and Rx after surgery.     Checked against .     FLORENCIA Crespo, CNP  Orthopedic Surgery

## 2021-10-29 DIAGNOSIS — E78.5 HYPERLIPIDEMIA LDL GOAL <130: ICD-10-CM

## 2021-10-29 RX ORDER — ROSUVASTATIN CALCIUM 5 MG/1
5 TABLET, COATED ORAL DAILY
Qty: 30 TABLET | Refills: 3 | Status: SHIPPED | OUTPATIENT
Start: 2021-10-29 | End: 2022-02-28

## 2021-10-29 NOTE — TELEPHONE ENCOUNTER
Routing refill request to provider for review/approval because:  Medication has end date, do you wish to continue.     Nicole Sullivan Rn

## 2021-11-03 DIAGNOSIS — Z11.59 ENCOUNTER FOR SCREENING FOR OTHER VIRAL DISEASES: ICD-10-CM

## 2021-12-01 NOTE — TELEPHONE ENCOUNTER
Unable to do total 12/6, offered 12/7 and patient wishes to just cancel surgery at this time, she thinks she has covid.     OR- Please move case [place] [facility].    I informed her I will call her when we are able to reschedule for a future date.

## 2021-12-02 DIAGNOSIS — M17.12 PRIMARY OSTEOARTHRITIS OF LEFT KNEE: ICD-10-CM

## 2021-12-02 RX ORDER — TRAMADOL HYDROCHLORIDE 50 MG/1
50 TABLET ORAL DAILY PRN
Qty: 39 TABLET | Refills: 0 | Status: CANCELLED | OUTPATIENT
Start: 2021-12-02

## 2021-12-02 NOTE — TELEPHONE ENCOUNTER
Patient cancelled surgery and has has no date to reschedule from. I am not in a position to provide narcotics when there is no end date. I recommend she speak with her primary care provider about taking over medication management for pain. Opioids would not be a good option for long term pain management.      She could also consider a referral to pain management if she would like.    FLORENCIA Crespo, CNP  Orthopedic Surgery

## 2021-12-03 ENCOUNTER — TELEPHONE (OUTPATIENT)
Dept: FAMILY MEDICINE | Facility: CLINIC | Age: 56
End: 2021-12-03

## 2021-12-03 DIAGNOSIS — M17.12 PRIMARY OSTEOARTHRITIS OF LEFT KNEE: ICD-10-CM

## 2021-12-03 RX ORDER — TRAMADOL HYDROCHLORIDE 50 MG/1
50 TABLET ORAL DAILY PRN
Qty: 39 TABLET | Refills: 0 | Status: CANCELLED | OUTPATIENT
Start: 2021-12-03

## 2021-12-03 NOTE — TELEPHONE ENCOUNTER
Reason for Call:  Other prescription    Detailed comments: tramadol 50mg, one a day    Phone Number Patient can be reached at: Cell number on file:    Telephone Information:   Mobile [phone]       Best Time: anytime    Can we leave a detailed message on this number? YES    Call taken on 12/3/2021 at 7:17 AM by Esperanza Aguilar

## 2021-12-03 NOTE — TELEPHONE ENCOUNTER
TRAMADOL      Last Written Prescription Date:  10/27/21  Last Fill Quantity: 39,   # refills: 0  Last Office Visit: 9/16/21  Future Office visit:       Routing refill request to provider for review/approval because:  Drug not on the [facility], P or [facility] refill protocol or controlled substance

## 2022-01-26 ENCOUNTER — TELEPHONE (OUTPATIENT)
Dept: ORTHOPEDICS | Facility: OTHER | Age: 57
End: 2022-01-26
Payer: COMMERCIAL

## 2022-01-26 NOTE — TELEPHONE ENCOUNTER
Spoke to patient, explained those open surgery dates have been filled as this was very last minute.    She asked that next time I droi her before 9am or after 11am.    Informed we will call her back with next opening.

## 2022-02-02 DIAGNOSIS — Z11.59 ENCOUNTER FOR SCREENING FOR OTHER VIRAL DISEASES: Primary | ICD-10-CM

## 2022-02-04 ENCOUNTER — OFFICE VISIT (OUTPATIENT)
Dept: FAMILY MEDICINE | Facility: CLINIC | Age: 57
End: 2022-02-04
Payer: COMMERCIAL

## 2022-02-04 VITALS
OXYGEN SATURATION: 100 % | RESPIRATION RATE: 16 BRPM | TEMPERATURE: 98.3 F | WEIGHT: 161 LBS | HEART RATE: 90 BPM | BODY MASS INDEX: 30.42 KG/M2 | SYSTOLIC BLOOD PRESSURE: 130 MMHG | DIASTOLIC BLOOD PRESSURE: 78 MMHG

## 2022-02-04 DIAGNOSIS — F33.1 MODERATE EPISODE OF RECURRENT MAJOR DEPRESSIVE DISORDER (H): ICD-10-CM

## 2022-02-04 DIAGNOSIS — Z11.59 ENCOUNTER FOR SCREENING FOR OTHER VIRAL DISEASES: ICD-10-CM

## 2022-02-04 DIAGNOSIS — Z72.0 TOBACCO ABUSE: ICD-10-CM

## 2022-02-04 DIAGNOSIS — M17.12 PRIMARY OSTEOARTHRITIS OF LEFT KNEE: ICD-10-CM

## 2022-02-04 DIAGNOSIS — E03.9 HYPOTHYROIDISM, UNSPECIFIED TYPE: ICD-10-CM

## 2022-02-04 DIAGNOSIS — Z01.818 PREOP GENERAL PHYSICAL EXAM: Primary | ICD-10-CM

## 2022-02-04 DIAGNOSIS — E78.5 HYPERLIPIDEMIA LDL GOAL <130: ICD-10-CM

## 2022-02-04 LAB
ANION GAP SERPL CALCULATED.3IONS-SCNC: 5 MMOL/L (ref 3–14)
BASOPHILS # BLD AUTO: 0 10E3/UL (ref 0–0.2)
BASOPHILS NFR BLD AUTO: 1 %
BUN SERPL-MCNC: 14 MG/DL (ref 7–30)
CALCIUM SERPL-MCNC: 9.2 MG/DL (ref 8.5–10.1)
CHLORIDE BLD-SCNC: 103 MMOL/L (ref 94–109)
CO2 SERPL-SCNC: 30 MMOL/L (ref 20–32)
CREAT SERPL-MCNC: 0.62 MG/DL (ref 0.52–1.04)
EOSINOPHIL # BLD AUTO: 0.1 10E3/UL (ref 0–0.7)
EOSINOPHIL NFR BLD AUTO: 3 %
ERYTHROCYTE [DISTWIDTH] IN BLOOD BY AUTOMATED COUNT: 12.9 % (ref 10–15)
GFR SERPL CREATININE-BSD FRML MDRD: >90 ML/MIN/1.73M2
GLUCOSE BLD-MCNC: 100 MG/DL (ref 70–99)
HCT VFR BLD AUTO: 44.5 % (ref 35–47)
HGB BLD-MCNC: 14.7 G/DL (ref 11.7–15.7)
IMM GRANULOCYTES # BLD: 0.1 10E3/UL
IMM GRANULOCYTES NFR BLD: 1 %
LYMPHOCYTES # BLD AUTO: 1.5 10E3/UL (ref 0.8–5.3)
LYMPHOCYTES NFR BLD AUTO: 27 %
MCH RBC QN AUTO: 29.9 PG (ref 26.5–33)
MCHC RBC AUTO-ENTMCNC: 33 G/DL (ref 31.5–36.5)
MCV RBC AUTO: 91 FL (ref 78–100)
MONOCYTES # BLD AUTO: 0.4 10E3/UL (ref 0–1.3)
MONOCYTES NFR BLD AUTO: 8 %
NEUTROPHILS # BLD AUTO: 3.4 10E3/UL (ref 1.6–8.3)
NEUTROPHILS NFR BLD AUTO: 60 %
NRBC # BLD AUTO: 0 10E3/UL
NRBC BLD AUTO-RTO: 0 /100
PLATELET # BLD AUTO: 224 10E3/UL (ref 150–450)
POTASSIUM BLD-SCNC: 4.3 MMOL/L (ref 3.4–5.3)
RBC # BLD AUTO: 4.91 10E6/UL (ref 3.8–5.2)
SODIUM SERPL-SCNC: 138 MMOL/L (ref 133–144)
WBC # BLD AUTO: 5.6 10E3/UL (ref 4–11)

## 2022-02-04 PROCEDURE — 99214 OFFICE O/P EST MOD 30 MIN: CPT | Performed by: NURSE PRACTITIONER

## 2022-02-04 PROCEDURE — 85025 COMPLETE CBC W/AUTO DIFF WBC: CPT | Performed by: NURSE PRACTITIONER

## 2022-02-04 PROCEDURE — 36415 COLL VENOUS BLD VENIPUNCTURE: CPT | Performed by: NURSE PRACTITIONER

## 2022-02-04 PROCEDURE — U0005 INFEC AGEN DETEC AMPLI PROBE: HCPCS | Performed by: NURSE PRACTITIONER

## 2022-02-04 PROCEDURE — 80048 BASIC METABOLIC PNL TOTAL CA: CPT | Performed by: NURSE PRACTITIONER

## 2022-02-04 PROCEDURE — U0003 INFECTIOUS AGENT DETECTION BY NUCLEIC ACID (DNA OR RNA); SEVERE ACUTE RESPIRATORY SYNDROME CORONAVIRUS 2 (SARS-COV-2) (CORONAVIRUS DISEASE [COVID-19]), AMPLIFIED PROBE TECHNIQUE, MAKING USE OF HIGH THROUGHPUT TECHNOLOGIES AS DESCRIBED BY CMS-2020-01-R: HCPCS | Performed by: NURSE PRACTITIONER

## 2022-02-04 ASSESSMENT — PAIN SCALES - GENERAL: PAINLEVEL: EXTREME PAIN (9)

## 2022-02-04 NOTE — PATIENT INSTRUCTIONS
Preparing for Your Surgery  Getting started  A nurse will call you to review your health history and instructions. They will give you an arrival time based on your scheduled surgery time. Please be ready to share:    Your doctor's clinic name and phone number    Your medical, surgical and anesthesia history    A list of allergies and sensitivities    A list of medicines, including herbal treatments and over-the-counter drugs    Whether the patient has a legal guardian (ask how to send us the papers in advance)  Please tell us if you're pregnant--or if there's any chance you might be pregnant. Some surgeries may injure a fetus (unborn baby), so they require a pregnancy test. Surgeries that are safe for a fetus don't always need a test, and you can choose whether to have one.   If you have a child who's having surgery, please ask for a copy of Preparing for Your Child's Surgery.    Preparing for surgery    Within 30 days of surgery: Have a pre-op exam (sometimes called an H&P, or History and Physical). This can be done at a clinic or pre-operative center.  ? If you're having a , you may not need this exam. Talk to your care team.    At your pre-op exam, talk to your care team about all medicines you take. If you need to stop any medicines before surgery, ask when to start taking them again.  ? We do this for your safety. Many medicines can make you bleed too much during surgery. Some change how well surgery (anesthesia) drugs work.    Call your insurance company to let them know you're having surgery. (If you don't have insurance, call [phone].)    Call your clinic if there's any change in your health. This includes signs of a cold or flu (sore throat, runny nose, cough, rash, fever). It also includes a scrape or scratch near the surgery site.    If you have questions on the day of surgery, call your hospital or surgery center.  COVID testing  You may need to be tested for COVID-19 before having  surgery. If so, your surgical team will give you instructions for scheduling this test, separate from your preoperative history and physical.  Eating and drinking guidelines  For your safety: Unless your surgeon tells you otherwise, follow the guidelines below.    Eat and drink as usual until 8 hours before surgery. After that, no food or milk.    Drink clear liquids until 2 hours before surgery. These are liquids you can see through, like water, Gatorade and Propel Water. You may also have black coffee and tea (no cream or milk).    Nothing by mouth within 2 hours of surgery. This includes gum, candy and breath mints.    If you drink alcohol: Stop drinking it the night before surgery.    If your care team tells you to take medicine on the morning of surgery, it's okay to take it with a sip of water.  Preventing infection    Shower or bathe the night before and morning of your surgery. Follow the instructions your clinic gave you. (If no instructions, use regular soap.)    Don't shave or clip hair near your surgery site. We'll remove the hair if needed.    Don't smoke or vape the morning of surgery. You may chew nicotine gum up to 2 hours before surgery. A nicotine patch is okay.  ? Note: Some surgeries require you to completely quit smoking and nicotine. Check with your surgeon.    Your care team will make every effort to keep you safe from infection. We will:  ? Clean our hands often with soap and water (or an alcohol-based hand rub).  ? Clean the skin at your surgery site with a special soap that kills germs.  ? Give you a special gown to keep you warm. (Cold raises the risk of infection.)  ? Wear special hair covers, masks, gowns and gloves during surgery.  ? Give antibiotic medicine, if prescribed. Not all surgeries need antibiotics.  What to bring on the day of surgery    Photo ID and insurance card    Copy of your health care directive, if you have one    Glasses and hearing aides (bring cases)  ? You can't  wear contacts during surgery    Inhaler and eye drops, if you use them (tell us about these when you arrive)    CPAP machine or breathing device, if you use them    A few personal items, if spending the night    If you have . . .  ? A pacemaker, ICD (cardiac defibrillator) or other implant: Bring the ID card.  ? An implanted stimulator: Bring the remote control.  ? A legal guardian: Bring a copy of the certified (court-stamped) guardianship papers.  Please remove any jewelry, including body piercings. Leave jewelry and other valuables at home.  If you're going home the day of surgery    You must have a responsible adult drive you home. They should stay with you overnight as well.    If you don't have someone to stay with you, and you aren't safe to go home alone, we may keep you overnight. Insurance often won't pay for this.  After surgery  If it's hard to control your pain or you need more pain medicine, please call your surgeon's office.  Questions?   If you have any questions for your care team, list them here: _________________________________________________________________________________________________________________________________________________________________________ ____________________________________ ____________________________________ ____________________________________  For informational purposes only. Not to replace the advice of your health care provider. Copyright   2003, 2019 [facility]. All rights reserved. Clinically reviewed by Lorelei Bell MD. [employer] [id number] - REV 07/21.

## 2022-02-04 NOTE — LETTER
February 4, 2022      Elisa Al  [address] [facility] [id number]        Dear ,    We are writing to inform you of your test results.    Labs are normal.      Kylie Coffman, MURPHY       Resulted Orders   Basic metabolic panel  (Ca, Cl, CO2, Creat, Gluc, K, Na, BUN)   Result Value Ref Range    Sodium 138 133 - 144 mmol/L    Potassium 4.3 3.4 - 5.3 mmol/L    Chloride 103 94 - 109 mmol/L    Carbon Dioxide (CO2) 30 20 - 32 mmol/L    Anion Gap 5 3 - 14 mmol/L    Urea Nitrogen 14 7 - 30 mg/dL    Creatinine 0.62 0.52 - 1.04 mg/dL    Calcium 9.2 8.5 - 10.1 mg/dL    Glucose 100 (H) 70 - 99 mg/dL    GFR Estimate >90 >60 mL/min/1.73m2      Comment:      Effective December 21, 2021 eGFRcr in adults is calculated using the 2021 CKD-EPI creatinine equation which includes age and gender (Clarisa higuera al., [facility], DOI: 10.1056/MUSVqx8297628)   CBC with platelets and differential   Result Value Ref Range    WBC Count 5.6 4.0 - 11.0 10e3/uL    RBC Count 4.91 3.80 - 5.20 10e6/uL    Hemoglobin 14.7 11.7 - 15.7 g/dL    Hematocrit 44.5 35.0 - 47.0 %    MCV 91 78 - 100 fL    MCH 29.9 26.5 - 33.0 pg    MCHC 33.0 31.5 - 36.5 g/dL    RDW 12.9 10.0 - 15.0 %    Platelet Count 224 150 - 450 10e3/uL    % Neutrophils 60 %    % Lymphocytes 27 %    % Monocytes 8 %    % Eosinophils 3 %    % Basophils 1 %    % Immature Granulocytes 1 %    NRBCs per 100 WBC 0 <1 /100    Absolute Neutrophils 3.4 1.6 - 8.3 10e3/uL    Absolute Lymphocytes 1.5 0.8 - 5.3 10e3/uL    Absolute Monocytes 0.4 0.0 - 1.3 10e3/uL    Absolute Eosinophils 0.1 0.0 - 0.7 10e3/uL    Absolute Basophils 0.0 0.0 - 0.2 10e3/uL    Absolute Immature Granulocytes 0.1 <=0.4 10e3/uL    Absolute NRBCs 0.0 10e3/uL       If you have any questions or concerns, please call the clinic at the number listed above.

## 2022-02-04 NOTE — PROGRESS NOTES
[address] [address]  Phone: [phone]  Fax: [phone]  Primary Provider: Alexa Gusman  Pre-op Performing Provider: ALEXA GUSMAN      PREOPERATIVE EVALUATION:  Today's date: 2/4/2022    Elisa Al is a 56 year old female who presents for a preoperative evaluation.    Surgical Information:  Surgery/Procedure: left total knee replacement  Surgery Location: [facility]  Surgeon: Dr. Cottrell  Surgery Date: 2/9/22  Time of Surgery: 10am  Where patient plans to recover: At home with family  Fax number for surgical facility: Note does not need to be faxed, will be available electronically in Epic.    Type of Anesthesia Anticipated: General      Preop general physical exam  Does not want spinal anesthesia as has had multiple back surgeries and has a back stimulator- will send to Ortho     Major depression in complete remission (H)  She has struggled more with recent loss of pets, covid 19, unable to walk and pain with her knee.  She denies suicidal ideation.  Does not want to adjust meds today.  Discussed good self care, sleep hygiene, mindfulness therapies and meditation.  - hydrOXYzine (ATARAX) 25 MG tablet; Take 1 tablet (25 mg) by mouth daily as needed for itching     Primary osteoarthritis of left knee  Going for replacement     Hypothyroidism, unspecified type  Controlled.  Stable- last 6/21    Hyperlipidemia:   On statin- check labs    Tobacco abuse:  Is working on cutting back and stopping.       Subjective     HPI related to upcoming procedure: knee pain- has had rescheduled twice before.      Preop Questions 2/4/2022   1. Have you ever had a heart attack or stroke? No   2. Have you ever had surgery on your heart or blood vessels, such as a stent placement, a coronary artery bypass, or surgery on an artery in your head, neck, heart, or legs? No   3. Do you have chest pain with activity? No   4. Do you have a history of  heart failure?  No   5. Do you currently have a cold, bronchitis or symptoms of other infection? No   6. Do you have a cough, shortness of breath, or wheezing? No   7. Do you or anyone in your family have previous history of blood clots? No   8. Do you or does anyone in your family have a serious bleeding problem such as prolonged bleeding following surgeries or cuts? No   9. Have you ever had problems with anemia or been told to take iron pills? No   10. Have you had any abnormal blood loss such as black, tarry or bloody stools, or abnormal vaginal bleeding? No   11. Have you ever had a blood transfusion? No   12. Are you willing to have a blood transfusion if it is medically needed before, during, or after your surgery? Yes   13. Have you or any of your relatives ever had problems with anesthesia? No   14. Do you have sleep apnea, excessive snoring or daytime drowsiness? No   15. Do you have any artifical heart valves or other implanted medical devices like a pacemaker, defibrillator, or continuous glucose monitor? No   16. Do you have artificial joints? No   17. Are you allergic to latex? No   18. Is there any chance that you may be pregnant? No       Health Care Directive:  Patient does not have a Health Care Directive or Living Will: Discussed advance care planning with patient; however, patient declined at this time.    Preoperative Review of :   reviewed - controlled substances reflected in medication list.      Status of Chronic Conditions:  DEPRESSION - Patient has a long history of Depression of moderate severity requiring medication for control with recent symptoms being stable..Current symptoms of depression include none.     HYPERLIPIDEMIA - Patient has a long history of significant Hyperlipidemia requiring medication for treatment with recent good control. Patient reports no problems or side effects with the medication.     HYPOTHYROIDISM - Patient has a longstanding history of chronic Hypothyroidism. Patient  has been doing well, noting no tremor, insomnia, hair loss or changes in skin texture. Continues to take medications as directed, without adverse reactions or side effects. Last TSH   Lab Results   Component Value Date    TSH 1.70 06/28/2021   .        Review of Systems  Constitutional, neuro, ENT, endocrine, pulmonary, cardiac, gastrointestinal, genitourinary, musculoskeletal, integument and psychiatric systems are negative, except as otherwise noted.    Patient Active Problem List    Diagnosis Date Noted     Peroneal tendonitis, left 04/13/2020     Priority: Medium     Primary osteoarthritis of left knee 09/04/2019     Priority: Medium     Hypothyroidism, unspecified type 02/21/2018     Priority: Medium     Major depression in complete remission (H) 02/21/2018     Priority: Medium     CARDIOVASCULAR SCREENING; LDL GOAL LESS THAN 160 10/31/2010     Priority: Medium     Pain in joint, shoulder region 12/05/2006     Priority: Medium      Past Medical History:   Diagnosis Date     Anxiety      Benign neoplasm of other specified sites of skin 4/6/2006    Finger - right third finger proximal palmar surface     Degenerative disk disease 12/18/12    anterior exposure of L4-L5 and L5-S1 disk spaces for fusion     Depressive disorder      Gout, unspecified      Other and unspecified ovarian cyst      Unspecified hypothyroidism years     Unspecified intestinal obstruction 02/02/2005    Partial bowel obstruction, Obstipation.     Past Surgical History:   Procedure Laterality Date     CARPAL TUNNEL RELEASE RT/LT  2005    right wrist     COLONOSCOPY N/A 8/16/2021    Procedure: COLONOSCOPY, WITH POLYPECTOMY;  Surgeon: Celso Shipley MD;  Location: [facility]     EXPLORE SPINE, REMOVE HARDWARE, COMBINED  4/7/2014    Procedure: COMBINED EXPLORE SPINE, REMOVE HARDWARE;  Hardware Removal Lumbar 4-Sacral 1, Exploration of Fusion;  Surgeon: Brenton Ashford MD;  Location: [facility]     FUSION LUMBAR ANTERIOR THREE+ LEVELS       HC  EXCISION TENDON SHEATH LESION, HAND/FINGR  04/17/2006    Right middle finger flexor sheath MP joint.     HC EXCISION TENDON SHEATH LESION, HAND/FINGR  08/07/06    Right middle finger     [facility] LAPAROSCOPY, SURGICAL; CHOLECYSTECTOMY  4/12/2004    Cholecystectomy, Laparoscopic     HC REMOVAL OF OVARY/TUBE(S)      left ovary removed secondary to cyst     HC REMOVAL OF TONSILS,12+ Y/O      Tonsils 12+y.o.     [facility] REVISE MEDIAN N/CARPAL TUNNEL SURG  8/22/2005    Left     [facility] LAP,ABD/PERIT/OMENTUM,UNLIST  04/12/10    pelvic adhesion     [facility] TOTAL ABDOM HYSTERECTOMY  09/08/2004    TAHRSO, Cystoscopy.     [facility] COLONOSCOPY W BIOPSY  04/26/10     [facility] COLONOSCOPY W/WO BRUSH/WASH  5/25/2005     [facility] UGI ENDOSCOPY, SIMPLE EXAM  5/25/2005     Current Outpatient Medications   Medication Sig Dispense Refill     cyclobenzaprine (FLEXERIL) 10 MG tablet Take 1 tablet (10 mg) by mouth 3 times daily as needed for muscle spasms (pain) 40 tablet 1     pseudoePHEDrine (SUDAFED) 120 MG 12 hr tablet Take 120 mg by mouth daily as needed for congestion        rosuvastatin (CRESTOR) 5 MG tablet Take 1 tablet (5 mg) by mouth daily 30 tablet 3     sertraline (ZOLOFT) 100 MG tablet TAKE ONE TABLET BY MOUTH ONCE DAILY 90 tablet 3     sertraline (ZOLOFT) 50 MG tablet TAKE ONE TABLET BY MOUTH ONCE DAILY 90 tablet 3     tiZANidine (ZANAFLEX) 4 MG tablet Take 1 tablet (4 mg) by mouth 3 times daily as needed for muscle spasms Can cause sedation. Do not mix with alcohol. 45 tablet 1     traMADol (ULTRAM) 50 MG tablet Take 1 tablet (50 mg) by mouth daily as needed for severe pain 39 tablet 0     TYLENOL EXTRA STRENGTH 500 MG OR TABS  60 0     levothyroxine (SYNTHROID/LEVOTHROID) 100 MCG tablet Take 1 tablet (100 mcg) by mouth daily Appointment needed for additional refills. 90 tablet 3       Allergies   Allergen Reactions     Aspirin Swelling     Erythromycin Ethylsuccinate GI Disturbance     gi upset     Ibuprofen Swelling     Naproxen      Nsaids Swelling      Zelnorm [Tegaserod Maleate]         Social History     Tobacco Use     Smoking status: Current Every Day Smoker     Packs/day: 0.25     Years: 23.00     Pack years: 5.75     Smokeless tobacco: Former User     Quit date: 1/19/2013   Substance Use Topics     Alcohol use: Yes     Alcohol/week: 0.0 standard drinks     Comment: nightly     Family History   Problem Relation Age of Onset     Blood Disease Mother         leukemia-passed 2008     Cardiovascular Father         triple bypass     Respiratory Father         Black Lung     Alzheimer Disease Father      Cancer Maternal Grandmother         ovarian/brain ca     History   Drug Use No         Objective     BP (!) 152/92 (Cuff Size: Adult Regular)   Pulse 90   Temp 98.3  F (36.8  C) (Temporal)   Resp 16   Wt 73 kg (161 lb)   LMP 01/13/2004   SpO2 100%   BMI 30.42 kg/m      Physical Exam    GENERAL APPEARANCE: healthy, alert and no distress     EYES: EOMI, PERRL     HENT: ear canals and TM's normal and nose and mouth without ulcers or lesions     NECK: no adenopathy, no asymmetry, masses, or scars and thyroid normal to palpation     RESP: lungs clear to auscultation - no rales, rhonchi or wheezes     CV: regular rates and rhythm, normal S1 S2, no S3 or S4 and no murmur, click or rub     ABDOMEN:  soft, nontender, no HSM or masses and bowel sounds normal     SKIN: no suspicious lesions or rashes     NEURO: Normal strength and tone, sensory exam grossly normal, mentation intact and speech normal     PSYCH: mentation appears normal. and affect normal/bright     LYMPHATICS: No cervical adenopathy    Recent Labs   Lab Test 09/16/21  1120 06/28/21  0753   HGB 13.6  --      --    NA  --  139   POTASSIUM  --  3.9   CR  --  0.70        Diagnostics:  No results found for this or any previous visit (from the past 24 hour(s)).   No EKG required, no history of coronary heart disease, significant arrhythmia, peripheral arterial disease or other structural heart  disease.    Revised Cardiac Risk Index (RCRI):  The patient has the following serious cardiovascular risks for perioperative complications:   - No serious cardiac risks = 0 points     RCRI Interpretation: 0 points: Class I (very low risk - 0.4% complication rate)           Signed Electronically by: Kyile Coffman NP  Copy of this evaluation report is provided to requesting physician.

## 2022-02-04 NOTE — H&P (VIEW-ONLY)
[address] [address]  Phone: [phone]  Fax: [phone]  Primary Provider: Alexa Gusman  Pre-op Performing Provider: ALEXA GUSMAN      PREOPERATIVE EVALUATION:  Today's date: 2/4/2022    Elisa Al is a 56 year old female who presents for a preoperative evaluation.    Surgical Information:  Surgery/Procedure: left total knee replacement  Surgery Location: [facility]  Surgeon: Dr. Cottrell  Surgery Date: 2/9/22  Time of Surgery: 10am  Where patient plans to recover: At home with family  Fax number for surgical facility: Note does not need to be faxed, will be available electronically in Epic.    Type of Anesthesia Anticipated: General      Preop general physical exam  Does not want spinal anesthesia as has had multiple back surgeries and has a back stimulator- will send to Ortho     Major depression in complete remission (H)  She has struggled more with recent loss of pets, covid 19, unable to walk and pain with her knee.  She denies suicidal ideation.  Does not want to adjust meds today.  Discussed good self care, sleep hygiene, mindfulness therapies and meditation.  - hydrOXYzine (ATARAX) 25 MG tablet; Take 1 tablet (25 mg) by mouth daily as needed for itching     Primary osteoarthritis of left knee  Going for replacement     Hypothyroidism, unspecified type  Controlled.  Stable- last 6/21    Hyperlipidemia:   On statin- check labs    Tobacco abuse:  Is working on cutting back and stopping.       Subjective     HPI related to upcoming procedure: knee pain- has had rescheduled twice before.      Preop Questions 2/4/2022   1. Have you ever had a heart attack or stroke? No   2. Have you ever had surgery on your heart or blood vessels, such as a stent placement, a coronary artery bypass, or surgery on an artery in your head, neck, heart, or legs? No   3. Do you have chest pain with activity? No   4. Do you have a history of  heart failure?  No   5. Do you currently have a cold, bronchitis or symptoms of other infection? No   6. Do you have a cough, shortness of breath, or wheezing? No   7. Do you or anyone in your family have previous history of blood clots? No   8. Do you or does anyone in your family have a serious bleeding problem such as prolonged bleeding following surgeries or cuts? No   9. Have you ever had problems with anemia or been told to take iron pills? No   10. Have you had any abnormal blood loss such as black, tarry or bloody stools, or abnormal vaginal bleeding? No   11. Have you ever had a blood transfusion? No   12. Are you willing to have a blood transfusion if it is medically needed before, during, or after your surgery? Yes   13. Have you or any of your relatives ever had problems with anesthesia? No   14. Do you have sleep apnea, excessive snoring or daytime drowsiness? No   15. Do you have any artifical heart valves or other implanted medical devices like a pacemaker, defibrillator, or continuous glucose monitor? No   16. Do you have artificial joints? No   17. Are you allergic to latex? No   18. Is there any chance that you may be pregnant? No       Health Care Directive:  Patient does not have a Health Care Directive or Living Will: Discussed advance care planning with patient; however, patient declined at this time.    Preoperative Review of :   reviewed - controlled substances reflected in medication list.      Status of Chronic Conditions:  DEPRESSION - Patient has a long history of Depression of moderate severity requiring medication for control with recent symptoms being stable..Current symptoms of depression include none.     HYPERLIPIDEMIA - Patient has a long history of significant Hyperlipidemia requiring medication for treatment with recent good control. Patient reports no problems or side effects with the medication.     HYPOTHYROIDISM - Patient has a longstanding history of chronic Hypothyroidism. Patient  has been doing well, noting no tremor, insomnia, hair loss or changes in skin texture. Continues to take medications as directed, without adverse reactions or side effects. Last TSH   Lab Results   Component Value Date    TSH 1.70 06/28/2021   .        Review of Systems  Constitutional, neuro, ENT, endocrine, pulmonary, cardiac, gastrointestinal, genitourinary, musculoskeletal, integument and psychiatric systems are negative, except as otherwise noted.    Patient Active Problem List    Diagnosis Date Noted     Peroneal tendonitis, left 04/13/2020     Priority: Medium     Primary osteoarthritis of left knee 09/04/2019     Priority: Medium     Hypothyroidism, unspecified type 02/21/2018     Priority: Medium     Major depression in complete remission (H) 02/21/2018     Priority: Medium     CARDIOVASCULAR SCREENING; LDL GOAL LESS THAN 160 10/31/2010     Priority: Medium     Pain in joint, shoulder region 12/05/2006     Priority: Medium      Past Medical History:   Diagnosis Date     Anxiety      Benign neoplasm of other specified sites of skin 4/6/2006    Finger - right third finger proximal palmar surface     Degenerative disk disease 12/18/12    anterior exposure of L4-L5 and L5-S1 disk spaces for fusion     Depressive disorder      Gout, unspecified      Other and unspecified ovarian cyst      Unspecified hypothyroidism years     Unspecified intestinal obstruction 02/02/2005    Partial bowel obstruction, Obstipation.     Past Surgical History:   Procedure Laterality Date     CARPAL TUNNEL RELEASE RT/LT  2005    right wrist     COLONOSCOPY N/A 8/16/2021    Procedure: COLONOSCOPY, WITH POLYPECTOMY;  Surgeon: Celso Shipley MD;  Location: [facility]     EXPLORE SPINE, REMOVE HARDWARE, COMBINED  4/7/2014    Procedure: COMBINED EXPLORE SPINE, REMOVE HARDWARE;  Hardware Removal Lumbar 4-Sacral 1, Exploration of Fusion;  Surgeon: Brenton Ashford MD;  Location: [facility]     FUSION LUMBAR ANTERIOR THREE+ LEVELS       HC  EXCISION TENDON SHEATH LESION, HAND/FINGR  04/17/2006    Right middle finger flexor sheath MP joint.     HC EXCISION TENDON SHEATH LESION, HAND/FINGR  08/07/06    Right middle finger     [facility] LAPAROSCOPY, SURGICAL; CHOLECYSTECTOMY  4/12/2004    Cholecystectomy, Laparoscopic     HC REMOVAL OF OVARY/TUBE(S)      left ovary removed secondary to cyst     HC REMOVAL OF TONSILS,12+ Y/O      Tonsils 12+y.o.     HC REVISE MEDIAN N/CARPAL TUNNEL SURG  8/22/2005    Left     [facility] LAP,ABD/PERIT/OMENTUM,UNLIST  04/12/10    pelvic adhesion     [facility] TOTAL ABDOM HYSTERECTOMY  09/08/2004    TAHRSO, Cystoscopy.     [facility] COLONOSCOPY W BIOPSY  04/26/10     [facility] COLONOSCOPY W/WO BRUSH/WASH  5/25/2005     [facility] UGI ENDOSCOPY, SIMPLE EXAM  5/25/2005     Current Outpatient Medications   Medication Sig Dispense Refill     cyclobenzaprine (FLEXERIL) 10 MG tablet Take 1 tablet (10 mg) by mouth 3 times daily as needed for muscle spasms (pain) 40 tablet 1     pseudoePHEDrine (SUDAFED) 120 MG 12 hr tablet Take 120 mg by mouth daily as needed for congestion        rosuvastatin (CRESTOR) 5 MG tablet Take 1 tablet (5 mg) by mouth daily 30 tablet 3     sertraline (ZOLOFT) 100 MG tablet TAKE ONE TABLET BY MOUTH ONCE DAILY 90 tablet 3     sertraline (ZOLOFT) 50 MG tablet TAKE ONE TABLET BY MOUTH ONCE DAILY 90 tablet 3     tiZANidine (ZANAFLEX) 4 MG tablet Take 1 tablet (4 mg) by mouth 3 times daily as needed for muscle spasms Can cause sedation. Do not mix with alcohol. 45 tablet 1     traMADol (ULTRAM) 50 MG tablet Take 1 tablet (50 mg) by mouth daily as needed for severe pain 39 tablet 0     TYLENOL EXTRA STRENGTH 500 MG OR TABS  60 0     levothyroxine (SYNTHROID/LEVOTHROID) 100 MCG tablet Take 1 tablet (100 mcg) by mouth daily Appointment needed for additional refills. 90 tablet 3       Allergies   Allergen Reactions     Aspirin Swelling     Erythromycin Ethylsuccinate GI Disturbance     gi upset     Ibuprofen Swelling     Naproxen      Nsaids Swelling      Zelnorm [Tegaserod Maleate]         Social History     Tobacco Use     Smoking status: Current Every Day Smoker     Packs/day: 0.25     Years: 23.00     Pack years: 5.75     Smokeless tobacco: Former User     Quit date: 1/19/2013   Substance Use Topics     Alcohol use: Yes     Alcohol/week: 0.0 standard drinks     Comment: nightly     Family History   Problem Relation Age of Onset     Blood Disease Mother         leukemia-passed 2008     Cardiovascular Father         triple bypass     Respiratory Father         Black Lung     Alzheimer Disease Father      Cancer Maternal Grandmother         ovarian/brain ca     History   Drug Use No         Objective     BP (!) 152/92 (Cuff Size: Adult Regular)   Pulse 90   Temp 98.3  F (36.8  C) (Temporal)   Resp 16   Wt 73 kg (161 lb)   LMP 01/13/2004   SpO2 100%   BMI 30.42 kg/m      Physical Exam    GENERAL APPEARANCE: healthy, alert and no distress     EYES: EOMI, PERRL     HENT: ear canals and TM's normal and nose and mouth without ulcers or lesions     NECK: no adenopathy, no asymmetry, masses, or scars and thyroid normal to palpation     RESP: lungs clear to auscultation - no rales, rhonchi or wheezes     CV: regular rates and rhythm, normal S1 S2, no S3 or S4 and no murmur, click or rub     ABDOMEN:  soft, nontender, no HSM or masses and bowel sounds normal     SKIN: no suspicious lesions or rashes     NEURO: Normal strength and tone, sensory exam grossly normal, mentation intact and speech normal     PSYCH: mentation appears normal. and affect normal/bright     LYMPHATICS: No cervical adenopathy    Recent Labs   Lab Test 09/16/21  1120 06/28/21  0753   HGB 13.6  --      --    NA  --  139   POTASSIUM  --  3.9   CR  --  0.70        Diagnostics:  No results found for this or any previous visit (from the past 24 hour(s)).   No EKG required, no history of coronary heart disease, significant arrhythmia, peripheral arterial disease or other structural heart  disease.    Revised Cardiac Risk Index (RCRI):  The patient has the following serious cardiovascular risks for perioperative complications:   - No serious cardiac risks = 0 points     RCRI Interpretation: 0 points: Class I (very low risk - 0.4% complication rate)           Signed Electronically by: Kylie Coffman NP  Copy of this evaluation report is provided to requesting physician.

## 2022-02-07 ENCOUNTER — OFFICE VISIT (OUTPATIENT)
Dept: ORTHOPEDICS | Facility: CLINIC | Age: 57
End: 2022-02-07
Payer: COMMERCIAL

## 2022-02-07 ENCOUNTER — APPOINTMENT (OUTPATIENT)
Dept: LAB | Facility: CLINIC | Age: 57
End: 2022-02-07
Payer: COMMERCIAL

## 2022-02-07 VITALS
BODY MASS INDEX: 30.4 KG/M2 | SYSTOLIC BLOOD PRESSURE: 138 MMHG | DIASTOLIC BLOOD PRESSURE: 88 MMHG | WEIGHT: 161 LBS | HEIGHT: 61 IN

## 2022-02-07 DIAGNOSIS — M17.12 PRIMARY OSTEOARTHRITIS OF LEFT KNEE: Primary | ICD-10-CM

## 2022-02-07 LAB — SARS-COV-2 RNA RESP QL NAA+PROBE: NEGATIVE

## 2022-02-07 PROCEDURE — 99207 PR PREOP VISIT IN GLOBAL PKG: CPT | Performed by: NURSE PRACTITIONER

## 2022-02-07 ASSESSMENT — MIFFLIN-ST. JEOR: SCORE: 1257.67

## 2022-02-07 NOTE — LETTER
2/7/2022         RE: Elisa Al  [address] [facility] [facility] [place] [address]        Dear Colleague,    Thank you for referring your patient, Elisa Al, to the [facility]. Please see a copy of my visit note below.    2 days prior to left total knee replacement  No red flags with pre-op h&P and labs.   COVID test was done today. Awaiting results.    Has spinal cord stimulator and multiple previous back surgeries. Does not want spinal anesthesia. Prefers general anesthesia. Instructed patient to speak with anesthesia AM of surgery so they know her wishes as well.    Recommended to bring spinal cord stimulator controller with to be able to turn off during surgery. Understands cannot proceed with case if we cannot turn it off.     Antibiotic cement    POD0 vs POD1 discharge    Direct to outpatient physical therapy. Order placed today.    Xarelto/eliquis for DVT. Has NSAID allergy.      FLORENCIA Crespo, CNP  Orthopedic Surgery          Again, thank you for allowing me to participate in the care of your patient.        Sincerely,        FLORENCIA Weinberg CNP

## 2022-02-07 NOTE — PROGRESS NOTES
2 days prior to left total knee replacement  No red flags with pre-op h&P and labs.   COVID test was done today. Awaiting results.    Has spinal cord stimulator and multiple previous back surgeries. Does not want spinal anesthesia. Prefers general anesthesia. Instructed patient to speak with anesthesia AM of surgery so they know her wishes as well.    Recommended to bring spinal cord stimulator controller with to be able to turn off during surgery. Understands cannot proceed with case if we cannot turn it off.     Antibiotic cement    POD0 vs POD1 discharge    Direct to outpatient physical therapy. Order placed today.    Xarelto/eliquis for DVT. Has NSAID allergy.      Chas Gaviria APRN, CNP  Orthopedic Surgery

## 2022-02-08 ENCOUNTER — ANESTHESIA EVENT (OUTPATIENT)
Dept: SURGERY | Facility: CLINIC | Age: 57
End: 2022-02-08
Payer: COMMERCIAL

## 2022-02-08 ASSESSMENT — LIFESTYLE VARIABLES: TOBACCO_USE: 1

## 2022-02-09 ENCOUNTER — APPOINTMENT (OUTPATIENT)
Dept: GENERAL RADIOLOGY | Facility: CLINIC | Age: 57
End: 2022-02-09
Attending: NURSE PRACTITIONER
Payer: COMMERCIAL

## 2022-02-09 ENCOUNTER — HOSPITAL ENCOUNTER (OUTPATIENT)
Facility: CLINIC | Age: 57
Discharge: HOME | End: 2022-02-11
Attending: ORTHOPAEDIC SURGERY | Admitting: ORTHOPAEDIC SURGERY
Payer: COMMERCIAL

## 2022-02-09 ENCOUNTER — ANESTHESIA (OUTPATIENT)
Dept: SURGERY | Facility: CLINIC | Age: 57
End: 2022-02-09
Payer: COMMERCIAL

## 2022-02-09 ENCOUNTER — APPOINTMENT (OUTPATIENT)
Dept: PHYSICAL THERAPY | Facility: CLINIC | Age: 57
End: 2022-02-09
Attending: NURSE PRACTITIONER
Payer: COMMERCIAL

## 2022-02-09 DIAGNOSIS — M17.12 PRIMARY OSTEOARTHRITIS OF LEFT KNEE: ICD-10-CM

## 2022-02-09 DIAGNOSIS — Z96.652 S/P TOTAL KNEE REPLACEMENT USING CEMENT, LEFT: Primary | ICD-10-CM

## 2022-02-09 PROCEDURE — 250N000009 HC RX 250: Performed by: NURSE ANESTHETIST, CERTIFIED REGISTERED

## 2022-02-09 PROCEDURE — C1776 JOINT DEVICE (IMPLANTABLE): HCPCS | Performed by: ORTHOPAEDIC SURGERY

## 2022-02-09 PROCEDURE — 278N000051 HC OR IMPLANT GENERAL: Performed by: ORTHOPAEDIC SURGERY

## 2022-02-09 PROCEDURE — 27447 TOTAL KNEE ARTHROPLASTY: CPT | Mod: LT | Performed by: ORTHOPAEDIC SURGERY

## 2022-02-09 PROCEDURE — 27447 TOTAL KNEE ARTHROPLASTY: CPT | Mod: AS | Performed by: NURSE PRACTITIONER

## 2022-02-09 PROCEDURE — 258N000003 HC RX IP 258 OP 636: Performed by: ORTHOPAEDIC SURGERY

## 2022-02-09 PROCEDURE — 250N000013 HC RX MED GY IP 250 OP 250 PS 637: Performed by: NURSE PRACTITIONER

## 2022-02-09 PROCEDURE — 250N000009 HC RX 250: Performed by: ORTHOPAEDIC SURGERY

## 2022-02-09 PROCEDURE — 250N000011 HC RX IP 250 OP 636: Performed by: NURSE ANESTHETIST, CERTIFIED REGISTERED

## 2022-02-09 PROCEDURE — 97530 THERAPEUTIC ACTIVITIES: CPT | Mod: GP

## 2022-02-09 PROCEDURE — 97161 PT EVAL LOW COMPLEX 20 MIN: CPT | Mod: GP,59

## 2022-02-09 PROCEDURE — 250N000025 HC SEVOFLURANE, PER MIN: Performed by: ORTHOPAEDIC SURGERY

## 2022-02-09 PROCEDURE — 258N000003 HC RX IP 258 OP 636: Performed by: NURSE ANESTHETIST, CERTIFIED REGISTERED

## 2022-02-09 PROCEDURE — 250N000011 HC RX IP 250 OP 636: Performed by: ORTHOPAEDIC SURGERY

## 2022-02-09 PROCEDURE — 97110 THERAPEUTIC EXERCISES: CPT | Mod: GP

## 2022-02-09 PROCEDURE — 272N000001 HC OR GENERAL SUPPLY STERILE: Performed by: ORTHOPAEDIC SURGERY

## 2022-02-09 PROCEDURE — 250N000013 HC RX MED GY IP 250 OP 250 PS 637: Performed by: ORTHOPAEDIC SURGERY

## 2022-02-09 PROCEDURE — 250N000011 HC RX IP 250 OP 636: Performed by: NURSE PRACTITIONER

## 2022-02-09 PROCEDURE — 360N000077 HC SURGERY LEVEL 4, PER MIN: Performed by: ORTHOPAEDIC SURGERY

## 2022-02-09 PROCEDURE — 999N000063 XR KNEE PORT LEFT 1/2 VIEWS: Mod: LT

## 2022-02-09 PROCEDURE — 710N000010 HC RECOVERY PHASE 1, LEVEL 2, PER MIN: Performed by: ORTHOPAEDIC SURGERY

## 2022-02-09 PROCEDURE — 370N000017 HC ANESTHESIA TECHNICAL FEE, PER MIN: Performed by: ORTHOPAEDIC SURGERY

## 2022-02-09 PROCEDURE — 999N000141 HC STATISTIC PRE-PROCEDURE NURSING ASSESSMENT: Performed by: ORTHOPAEDIC SURGERY

## 2022-02-09 DEVICE — IMP COMP FEM JJ ATTUNE POST STAB LT NRW CEM SZ6 150410126: Type: IMPLANTABLE DEVICE | Site: KNEE | Status: FUNCTIONAL

## 2022-02-09 DEVICE — IMPLANTABLE DEVICE: Type: IMPLANTABLE DEVICE | Site: KNEE | Status: FUNCTIONAL

## 2022-02-09 DEVICE — BONE CEMENT GENTAMYCIN SMART SET GHV 5450-35-500: Type: IMPLANTABLE DEVICE | Site: KNEE | Status: FUNCTIONAL

## 2022-02-09 RX ORDER — DEXAMETHASONE SODIUM PHOSPHATE 10 MG/ML
INJECTION, SOLUTION INTRAMUSCULAR; INTRAVENOUS PRN
Status: DISCONTINUED | OUTPATIENT
Start: 2022-02-09 | End: 2022-02-09

## 2022-02-09 RX ORDER — LIDOCAINE 40 MG/G
CREAM TOPICAL
Status: DISCONTINUED | OUTPATIENT
Start: 2022-02-09 | End: 2022-02-11 | Stop reason: HOSPADM

## 2022-02-09 RX ORDER — TRANEXAMIC ACID 650 MG/1
1950 TABLET ORAL ONCE
Status: COMPLETED | OUTPATIENT
Start: 2022-02-09 | End: 2022-02-09

## 2022-02-09 RX ORDER — OXYCODONE HYDROCHLORIDE 5 MG/1
5 TABLET ORAL EVERY 4 HOURS PRN
Status: DISCONTINUED | OUTPATIENT
Start: 2022-02-09 | End: 2022-02-10

## 2022-02-09 RX ORDER — LEVOTHYROXINE SODIUM 100 UG/1
100 TABLET ORAL DAILY
Status: DISCONTINUED | OUTPATIENT
Start: 2022-02-10 | End: 2022-02-10

## 2022-02-09 RX ORDER — HYDROXYZINE HYDROCHLORIDE 25 MG/1
25 TABLET, FILM COATED ORAL EVERY 6 HOURS PRN
Status: DISCONTINUED | OUTPATIENT
Start: 2022-02-09 | End: 2022-02-11 | Stop reason: HOSPADM

## 2022-02-09 RX ORDER — POLYETHYLENE GLYCOL 3350 17 G/17G
1 POWDER, FOR SOLUTION ORAL DAILY
Qty: 7 PACKET | Refills: 0 | Status: SHIPPED | OUTPATIENT
Start: 2022-02-09 | End: 2022-06-21

## 2022-02-09 RX ORDER — ONDANSETRON 4 MG/1
4 TABLET, ORALLY DISINTEGRATING ORAL EVERY 6 HOURS PRN
Status: DISCONTINUED | OUTPATIENT
Start: 2022-02-09 | End: 2022-02-11 | Stop reason: HOSPADM

## 2022-02-09 RX ORDER — OXYCODONE HYDROCHLORIDE 5 MG/1
5-10 TABLET ORAL EVERY 4 HOURS PRN
Qty: 30 TABLET | Refills: 0 | Status: SHIPPED | OUTPATIENT
Start: 2022-02-09 | End: 2022-02-16

## 2022-02-09 RX ORDER — DIMENHYDRINATE 50 MG/ML
25 INJECTION, SOLUTION INTRAMUSCULAR; INTRAVENOUS
Status: DISCONTINUED | OUTPATIENT
Start: 2022-02-09 | End: 2022-02-09 | Stop reason: HOSPADM

## 2022-02-09 RX ORDER — NALOXONE HYDROCHLORIDE 0.4 MG/ML
0.4 INJECTION, SOLUTION INTRAMUSCULAR; INTRAVENOUS; SUBCUTANEOUS
Status: DISCONTINUED | OUTPATIENT
Start: 2022-02-09 | End: 2022-02-11 | Stop reason: HOSPADM

## 2022-02-09 RX ORDER — LIDOCAINE 40 MG/G
CREAM TOPICAL
Status: DISCONTINUED | OUTPATIENT
Start: 2022-02-09 | End: 2022-02-09 | Stop reason: HOSPADM

## 2022-02-09 RX ORDER — SERTRALINE HYDROCHLORIDE 100 MG/1
100 TABLET, FILM COATED ORAL DAILY
Status: DISCONTINUED | OUTPATIENT
Start: 2022-02-10 | End: 2022-02-11 | Stop reason: HOSPADM

## 2022-02-09 RX ORDER — MEPERIDINE HYDROCHLORIDE 25 MG/ML
12.5 INJECTION INTRAMUSCULAR; INTRAVENOUS; SUBCUTANEOUS EVERY 5 MIN PRN
Status: DISCONTINUED | OUTPATIENT
Start: 2022-02-09 | End: 2022-02-09 | Stop reason: HOSPADM

## 2022-02-09 RX ORDER — CEFAZOLIN SODIUM 1 G/3ML
1 INJECTION, POWDER, FOR SOLUTION INTRAMUSCULAR; INTRAVENOUS EVERY 8 HOURS
Status: COMPLETED | OUTPATIENT
Start: 2022-02-09 | End: 2022-02-10

## 2022-02-09 RX ORDER — SODIUM CHLORIDE, SODIUM LACTATE, POTASSIUM CHLORIDE, CALCIUM CHLORIDE 600; 310; 30; 20 MG/100ML; MG/100ML; MG/100ML; MG/100ML
INJECTION, SOLUTION INTRAVENOUS CONTINUOUS
Status: DISCONTINUED | OUTPATIENT
Start: 2022-02-09 | End: 2022-02-09 | Stop reason: HOSPADM

## 2022-02-09 RX ORDER — ACETAMINOPHEN 325 MG/1
975 TABLET ORAL EVERY 8 HOURS PRN
Qty: 100 TABLET | Refills: 0 | Status: SHIPPED | OUTPATIENT
Start: 2022-02-09

## 2022-02-09 RX ORDER — CEFAZOLIN SODIUM 2 G/100ML
2 INJECTION, SOLUTION INTRAVENOUS
Status: COMPLETED | OUTPATIENT
Start: 2022-02-09 | End: 2022-02-09

## 2022-02-09 RX ORDER — ONDANSETRON 4 MG/1
4 TABLET, ORALLY DISINTEGRATING ORAL EVERY 30 MIN PRN
Status: DISCONTINUED | OUTPATIENT
Start: 2022-02-09 | End: 2022-02-09 | Stop reason: HOSPADM

## 2022-02-09 RX ORDER — PROPOFOL 10 MG/ML
INJECTION, EMULSION INTRAVENOUS PRN
Status: DISCONTINUED | OUTPATIENT
Start: 2022-02-09 | End: 2022-02-09

## 2022-02-09 RX ORDER — HYDROXYZINE HYDROCHLORIDE 50 MG/ML
75 INJECTION, SOLUTION INTRAMUSCULAR ONCE
Status: DISCONTINUED | OUTPATIENT
Start: 2022-02-09 | End: 2022-02-09

## 2022-02-09 RX ORDER — OXYCODONE HYDROCHLORIDE 5 MG/1
10 TABLET ORAL EVERY 4 HOURS PRN
Status: DISCONTINUED | OUTPATIENT
Start: 2022-02-09 | End: 2022-02-10

## 2022-02-09 RX ORDER — ACETAMINOPHEN 325 MG/1
975 TABLET ORAL EVERY 8 HOURS
Status: DISCONTINUED | OUTPATIENT
Start: 2022-02-09 | End: 2022-02-11 | Stop reason: HOSPADM

## 2022-02-09 RX ORDER — CEFAZOLIN SODIUM 2 G/100ML
2 INJECTION, SOLUTION INTRAVENOUS SEE ADMIN INSTRUCTIONS
Status: DISCONTINUED | OUTPATIENT
Start: 2022-02-09 | End: 2022-02-09 | Stop reason: HOSPADM

## 2022-02-09 RX ORDER — OXYCODONE HYDROCHLORIDE 5 MG/1
5 TABLET ORAL EVERY 4 HOURS PRN
Status: DISCONTINUED | OUTPATIENT
Start: 2022-02-09 | End: 2022-02-09 | Stop reason: HOSPADM

## 2022-02-09 RX ORDER — CYCLOBENZAPRINE HCL 10 MG
10 TABLET ORAL 3 TIMES DAILY PRN
Status: DISCONTINUED | OUTPATIENT
Start: 2022-02-09 | End: 2022-02-11 | Stop reason: HOSPADM

## 2022-02-09 RX ORDER — POLYETHYLENE GLYCOL 3350 17 G/17G
17 POWDER, FOR SOLUTION ORAL DAILY
Status: DISCONTINUED | OUTPATIENT
Start: 2022-02-10 | End: 2022-02-11 | Stop reason: HOSPADM

## 2022-02-09 RX ORDER — BUPIVACAINE HYDROCHLORIDE AND EPINEPHRINE 5; 5 MG/ML; UG/ML
INJECTION, SOLUTION PERINEURAL PRN
Status: DISCONTINUED | OUTPATIENT
Start: 2022-02-09 | End: 2022-02-09

## 2022-02-09 RX ORDER — ONDANSETRON 2 MG/ML
INJECTION INTRAMUSCULAR; INTRAVENOUS PRN
Status: DISCONTINUED | OUTPATIENT
Start: 2022-02-09 | End: 2022-02-09

## 2022-02-09 RX ORDER — HYDROXYZINE HYDROCHLORIDE 25 MG/1
25 TABLET, FILM COATED ORAL EVERY 6 HOURS PRN
Qty: 30 TABLET | Refills: 0 | Status: SHIPPED | OUTPATIENT
Start: 2022-02-09 | End: 2022-06-21

## 2022-02-09 RX ORDER — PROCHLORPERAZINE MALEATE 5 MG
10 TABLET ORAL EVERY 6 HOURS PRN
Status: DISCONTINUED | OUTPATIENT
Start: 2022-02-09 | End: 2022-02-11 | Stop reason: HOSPADM

## 2022-02-09 RX ORDER — ONDANSETRON 2 MG/ML
4 INJECTION INTRAMUSCULAR; INTRAVENOUS EVERY 6 HOURS PRN
Status: DISCONTINUED | OUTPATIENT
Start: 2022-02-09 | End: 2022-02-11 | Stop reason: HOSPADM

## 2022-02-09 RX ORDER — SODIUM CHLORIDE, SODIUM LACTATE, POTASSIUM CHLORIDE, CALCIUM CHLORIDE 600; 310; 30; 20 MG/100ML; MG/100ML; MG/100ML; MG/100ML
INJECTION, SOLUTION INTRAVENOUS CONTINUOUS
Status: DISCONTINUED | OUTPATIENT
Start: 2022-02-09 | End: 2022-02-11 | Stop reason: HOSPADM

## 2022-02-09 RX ORDER — HYDROMORPHONE HCL IN WATER/PF 6 MG/30 ML
0.4 PATIENT CONTROLLED ANALGESIA SYRINGE INTRAVENOUS
Status: DISCONTINUED | OUTPATIENT
Start: 2022-02-09 | End: 2022-02-10

## 2022-02-09 RX ORDER — NALOXONE HYDROCHLORIDE 0.4 MG/ML
0.2 INJECTION, SOLUTION INTRAMUSCULAR; INTRAVENOUS; SUBCUTANEOUS
Status: DISCONTINUED | OUTPATIENT
Start: 2022-02-09 | End: 2022-02-11 | Stop reason: HOSPADM

## 2022-02-09 RX ORDER — AMOXICILLIN 250 MG
1 CAPSULE ORAL 2 TIMES DAILY
Status: DISCONTINUED | OUTPATIENT
Start: 2022-02-09 | End: 2022-02-11 | Stop reason: HOSPADM

## 2022-02-09 RX ORDER — FENTANYL CITRATE 50 UG/ML
50 INJECTION, SOLUTION INTRAMUSCULAR; INTRAVENOUS EVERY 5 MIN PRN
Status: DISCONTINUED | OUTPATIENT
Start: 2022-02-09 | End: 2022-02-09 | Stop reason: HOSPADM

## 2022-02-09 RX ORDER — MEPERIDINE HYDROCHLORIDE 25 MG/ML
75 INJECTION INTRAMUSCULAR; INTRAVENOUS; SUBCUTANEOUS ONCE
Status: COMPLETED | OUTPATIENT
Start: 2022-02-09 | End: 2022-02-09

## 2022-02-09 RX ORDER — HYDROMORPHONE HYDROCHLORIDE 1 MG/ML
0.4 INJECTION, SOLUTION INTRAMUSCULAR; INTRAVENOUS; SUBCUTANEOUS EVERY 5 MIN PRN
Status: DISCONTINUED | OUTPATIENT
Start: 2022-02-09 | End: 2022-02-09 | Stop reason: HOSPADM

## 2022-02-09 RX ORDER — HYDROXYZINE HYDROCHLORIDE 50 MG/ML
50 INJECTION, SOLUTION INTRAMUSCULAR ONCE
Status: COMPLETED | OUTPATIENT
Start: 2022-02-09 | End: 2022-02-09

## 2022-02-09 RX ORDER — PROPOFOL 10 MG/ML
INJECTION, EMULSION INTRAVENOUS CONTINUOUS PRN
Status: DISCONTINUED | OUTPATIENT
Start: 2022-02-09 | End: 2022-02-09

## 2022-02-09 RX ORDER — AMOXICILLIN 250 MG
1-2 CAPSULE ORAL 2 TIMES DAILY
Qty: 30 TABLET | Refills: 0 | Status: SHIPPED | OUTPATIENT
Start: 2022-02-09 | End: 2022-06-21

## 2022-02-09 RX ORDER — LIDOCAINE HYDROCHLORIDE 20 MG/ML
INJECTION, SOLUTION INFILTRATION; PERINEURAL PRN
Status: DISCONTINUED | OUTPATIENT
Start: 2022-02-09 | End: 2022-02-09

## 2022-02-09 RX ORDER — FENTANYL CITRATE 50 UG/ML
INJECTION, SOLUTION INTRAMUSCULAR; INTRAVENOUS PRN
Status: DISCONTINUED | OUTPATIENT
Start: 2022-02-09 | End: 2022-02-09

## 2022-02-09 RX ORDER — ONDANSETRON 2 MG/ML
4 INJECTION INTRAMUSCULAR; INTRAVENOUS EVERY 30 MIN PRN
Status: DISCONTINUED | OUTPATIENT
Start: 2022-02-09 | End: 2022-02-09 | Stop reason: HOSPADM

## 2022-02-09 RX ORDER — FAMOTIDINE 20 MG/1
20 TABLET, FILM COATED ORAL 2 TIMES DAILY
Status: DISCONTINUED | OUTPATIENT
Start: 2022-02-09 | End: 2022-02-11 | Stop reason: HOSPADM

## 2022-02-09 RX ORDER — HYDROMORPHONE HCL IN WATER/PF 6 MG/30 ML
0.2 PATIENT CONTROLLED ANALGESIA SYRINGE INTRAVENOUS
Status: DISCONTINUED | OUTPATIENT
Start: 2022-02-09 | End: 2022-02-10

## 2022-02-09 RX ADMIN — CEFAZOLIN 1 G: 1 INJECTION, POWDER, FOR SOLUTION INTRAMUSCULAR; INTRAVENOUS at 17:08

## 2022-02-09 RX ADMIN — ROPIVACAINE HYDROCHLORIDE: 10 INJECTION, SOLUTION EPIDURAL at 11:30

## 2022-02-09 RX ADMIN — FENTANYL CITRATE 50 MCG: 50 INJECTION INTRAMUSCULAR; INTRAVENOUS at 12:45

## 2022-02-09 RX ADMIN — FAMOTIDINE 20 MG: 20 TABLET ORAL at 20:03

## 2022-02-09 RX ADMIN — CEFAZOLIN SODIUM 2 G: 2 INJECTION, SOLUTION INTRAVENOUS at 09:31

## 2022-02-09 RX ADMIN — HYDROMORPHONE HYDROCHLORIDE 0.5 MG: 1 INJECTION, SOLUTION INTRAMUSCULAR; INTRAVENOUS; SUBCUTANEOUS at 11:54

## 2022-02-09 RX ADMIN — HYDROMORPHONE HYDROCHLORIDE 0.2 MG: 0.2 INJECTION, SOLUTION INTRAMUSCULAR; INTRAVENOUS; SUBCUTANEOUS at 20:03

## 2022-02-09 RX ADMIN — FENTANYL CITRATE 50 MCG: 50 INJECTION INTRAMUSCULAR; INTRAVENOUS at 12:23

## 2022-02-09 RX ADMIN — ACETAMINOPHEN 975 MG: 325 TABLET, FILM COATED ORAL at 14:36

## 2022-02-09 RX ADMIN — FENTANYL CITRATE 50 MCG: 50 INJECTION, SOLUTION INTRAMUSCULAR; INTRAVENOUS at 10:31

## 2022-02-09 RX ADMIN — FENTANYL CITRATE 50 MCG: 50 INJECTION, SOLUTION INTRAMUSCULAR; INTRAVENOUS at 11:28

## 2022-02-09 RX ADMIN — PROPOFOL 200 MG: 10 INJECTION, EMULSION INTRAVENOUS at 10:15

## 2022-02-09 RX ADMIN — HYDROMORPHONE HYDROCHLORIDE 0.4 MG: 0.2 INJECTION, SOLUTION INTRAMUSCULAR; INTRAVENOUS; SUBCUTANEOUS at 14:39

## 2022-02-09 RX ADMIN — HYDROXYZINE HYDROCHLORIDE 50 MG: 50 INJECTION, SOLUTION INTRAMUSCULAR at 13:18

## 2022-02-09 RX ADMIN — OXYCODONE HYDROCHLORIDE 10 MG: 5 TABLET ORAL at 21:12

## 2022-02-09 RX ADMIN — SODIUM CHLORIDE, POTASSIUM CHLORIDE, SODIUM LACTATE AND CALCIUM CHLORIDE: 600; 310; 30; 20 INJECTION, SOLUTION INTRAVENOUS at 09:06

## 2022-02-09 RX ADMIN — LIDOCAINE HYDROCHLORIDE 0.1 ML: 10 INJECTION, SOLUTION EPIDURAL; INFILTRATION; INTRACAUDAL; PERINEURAL at 09:06

## 2022-02-09 RX ADMIN — HYDROXYZINE HYDROCHLORIDE 25 MG: 25 TABLET ORAL at 17:06

## 2022-02-09 RX ADMIN — Medication 10 ML: at 12:27

## 2022-02-09 RX ADMIN — LIDOCAINE HYDROCHLORIDE 40 MG: 20 INJECTION, SOLUTION INFILTRATION; PERINEURAL at 10:15

## 2022-02-09 RX ADMIN — FENTANYL CITRATE 50 MCG: 50 INJECTION, SOLUTION INTRAMUSCULAR; INTRAVENOUS at 10:40

## 2022-02-09 RX ADMIN — OXYCODONE HYDROCHLORIDE 5 MG: 5 TABLET ORAL at 17:06

## 2022-02-09 RX ADMIN — ONDANSETRON 4 MG: 2 INJECTION INTRAMUSCULAR; INTRAVENOUS at 11:39

## 2022-02-09 RX ADMIN — MEPERIDINE HYDROCHLORIDE 75 MG: 25 INJECTION INTRAMUSCULAR; INTRAVENOUS; SUBCUTANEOUS at 13:18

## 2022-02-09 RX ADMIN — FENTANYL CITRATE 50 MCG: 50 INJECTION, SOLUTION INTRAMUSCULAR; INTRAVENOUS at 10:11

## 2022-02-09 RX ADMIN — FENTANYL CITRATE 50 MCG: 50 INJECTION INTRAMUSCULAR; INTRAVENOUS at 12:18

## 2022-02-09 RX ADMIN — PROPOFOL 100 MCG/KG/MIN: 10 INJECTION, EMULSION INTRAVENOUS at 10:17

## 2022-02-09 RX ADMIN — Medication 5 ML: at 12:28

## 2022-02-09 RX ADMIN — TRANEXAMIC ACID 1950 MG: 650 TABLET ORAL at 08:29

## 2022-02-09 RX ADMIN — HYDROMORPHONE HYDROCHLORIDE 0.4 MG: 1 INJECTION, SOLUTION INTRAMUSCULAR; INTRAVENOUS; SUBCUTANEOUS at 12:50

## 2022-02-09 RX ADMIN — SENNOSIDES AND DOCUSATE SODIUM 1 TABLET: 50; 8.6 TABLET ORAL at 20:03

## 2022-02-09 RX ADMIN — MIDAZOLAM 2 MG: 1 INJECTION INTRAMUSCULAR; INTRAVENOUS at 10:06

## 2022-02-09 RX ADMIN — SODIUM CHLORIDE, POTASSIUM CHLORIDE, SODIUM LACTATE AND CALCIUM CHLORIDE: 600; 310; 30; 20 INJECTION, SOLUTION INTRAVENOUS at 12:46

## 2022-02-09 RX ADMIN — FENTANYL CITRATE 50 MCG: 50 INJECTION INTRAMUSCULAR; INTRAVENOUS at 12:39

## 2022-02-09 RX ADMIN — CYCLOBENZAPRINE HYDROCHLORIDE 10 MG: 10 TABLET, FILM COATED ORAL at 19:18

## 2022-02-09 RX ADMIN — DEXAMETHASONE SODIUM PHOSPHATE 10 MG: 10 INJECTION, SOLUTION INTRAMUSCULAR; INTRAVENOUS at 10:22

## 2022-02-09 RX ADMIN — ACETAMINOPHEN 975 MG: 325 TABLET, FILM COATED ORAL at 23:30

## 2022-02-09 ASSESSMENT — ACTIVITIES OF DAILY LIVING (ADL)
DIFFICULTY_EATING/SWALLOWING: NO
DIFFICULTY_COMMUNICATING: NO
HEARING_DIFFICULTY_OR_DEAF: NO
CONCENTRATING,_REMEMBERING_OR_MAKING_DECISIONS_DIFFICULTY: NO
VISION_MANAGEMENT: WEARS GLASSES
FALL_HISTORY_WITHIN_LAST_SIX_MONTHS: NO
DRESSING/BATHING: BATHING DIFFICULTY, ASSISTANCE 1 PERSON
WALKING_OR_CLIMBING_STAIRS_DIFFICULTY: YES
DOING_ERRANDS_INDEPENDENTLY_DIFFICULTY: NO
DRESSING/BATHING_DIFFICULTY: YES
PATIENT_/_FAMILY_COMMUNICATION_STYLE: SPOKEN LANGUAGE (ENGLISH OR BILINGUAL)
TOILETING_ISSUES: NO
WALKING_OR_CLIMBING_STAIRS: STAIR CLIMBING DIFFICULTY, REQUIRES EQUIPMENT
WEAR_GLASSES_OR_BLIND: YES

## 2022-02-09 ASSESSMENT — KOOS JR
HOW SEVERE IS YOUR KNEE STIFFNESS AFTER FIRST WAKING IN MORNING: EXTREME
KOOS JR SCORING: 0
BENDING TO THE FLOOR TO PICK UP OBJECT: EXTREME
GOING UP OR DOWN STAIRS: EXTREME
TWISING OR PIVOTING ON KNEE: EXTREME
STRAIGHTENING KNEE FULLY: EXTREME
STANDING UPRIGHT: EXTREME
RISING FROM SITTING: EXTREME

## 2022-02-09 ASSESSMENT — MIFFLIN-ST. JEOR
SCORE: 1323.38
SCORE: 1257.67

## 2022-02-09 NOTE — ANESTHESIA CARE TRANSFER NOTE
Patient: Elisa Al    Procedure: Procedure(s):  left total knee replacement       Diagnosis: Primary osteoarthritis of left knee [M17.12]  Diagnosis Additional Information: No value filed.    Anesthesia Type:   General     Note:    Oropharynx: oropharynx clear of all foreign objects and spontaneously breathing  Level of Consciousness: drowsy  Oxygen Supplementation: face mask    Independent Airway: airway patency satisfactory and stable  Dentition: dentition unchanged  Vital Signs Stable: post-procedure vital signs reviewed and stable  Report to RN Given: handoff report given  Patient transferred to: PACU    Handoff Report: Identifed the Patient, Identified the Reponsible Provider, Reviewed the pertinent medical history, Discussed the surgical course, Reviewed Intra-OP anesthesia mangement and issues during anesthesia, Set expectations for post-procedure period and Allowed opportunity for questions and acknowledgement of understanding      Vitals:  Vitals Value Taken Time   BP     Temp     Pulse 123 02/09/22 1207   Resp 24 02/09/22 1207   SpO2 95 % 02/09/22 1207   Vitals shown include unvalidated device data.    Electronically Signed By: FLORENCIA Rollins CRNA  February 9, 2022  12:08 PM

## 2022-02-09 NOTE — ANESTHESIA PROCEDURE NOTES
Airway       Patient location during procedure: OR  Staff -        CRNA: Melo Vernon APRN CRNA       Performed By: CRNA  Consent for Airway        Urgency: elective  Indications and Patient Condition       Indications for airway management: julio césar-procedural       Induction type:intravenous       Mask difficulty assessment: 1 - vent by mask    Final Airway Details       Final airway type: supraglottic airway    Supraglottic Airway Details        Type: LMA       Brand: Ambu AuraGain       LMA size: 3    Post intubation assessment        Placement verified by: capnometry, equal breath sounds and chest rise        Number of attempts at approach: 1       Number of other approaches attempted: 0       Secured with: plastic tape       Ease of procedure: easy       Dentition: Intact and Unchanged

## 2022-02-09 NOTE — OP NOTE
Procedure Date: 02/09/2022    PREOPERATIVE DIAGNOSIS:  Left knee primary osteoarthritis.    POSTOPERATIVE DIAGNOSIS:  Left knee primary osteoarthritis.    PROCEDURE:  Left total knee arthroplasty.    SURGEON:  Sahil Cottrell DO    FIRST ASSISTANT:  Elia Gaviria, nurse practitioner (utilized throughout the procedure, assisting with soft tissue retraction, assisting with trial and final implant placement, and he provided skin closure).    ANESTHESIA:  General.    COMPLICATIONS:  None.    ESTIMATED BLOOD LOSS:  Less than 10 mL    FLUIDS:  800 mL crystalloids.    COUNTS:  Correct.    DISPOSITION:  PACU in stable condition.    GROSS FINDINGS:  There was full motion, 0-130 degrees of motion.  There was a flexible varus contracture with bone-on-bone arthritis, medial compartment with rimming osteophytes.  She also had some full-thickness cartilage loss through the apex and lateral facet of the patella.    IMPLANTS:  Includes DePuy Attune size 6 narrow posterior stabilized femoral component, a size 4 rotating platform tibial base plate, a 5 mm polyethylene insert, and a 35 mm patellar button.    INDICATION:  This is a pleasant 56-year-old female with longstanding progressive knee pain.  She has attempted Tylenol, steroid injections, activity modification, bracing and rest, yet still continues to have pain.  She has pain at rest, increased pain with activity, pain that woke her at night and gait disturbances.  Her radiographs are clinically correlated.  Given her continued pain refractory to conservative care impacting the quality of her life, we discussed proceeding with knee replacement.  The risks, benefits and complications discussed.  The postoperative timeframe for recovery was reviewed.  Once thoroughly reviewed with her, she opted to proceed.    DESCRIPTION OF PROCEDURE:  She was met preoperatively.  Again, informed consent was verified, the appropriate site was marked and she was wheeled to operative suite #1.   Transferred to the OR table without any issues.  When deemed appropriate by Anesthesia, the left lower extremity was sterilely prepped and draped in a normal manner.  Prior to incision, a timeout was performed.  Again, the appropriate patient, surgery and extremity were verified and antibiotics had been administered.  An Esmarch was utilized to exsanguinate the extremity.  The tourniquet was inflated on her upper thigh.  A midline skin incision was followed by a medial parapatellar arthrotomy.  The anterior fat pad was excised.  The medial tibia was skeletonized with no releases being performed.  Rimming osteophytes were removed.  The knee was flexed up to 90 degrees.  Collateral ligament retractors were placed.  A drill was utilized to enter the distal intramedullary canal of the femur with no issues.  The contents were very gradually and easily aspirated.  The distal jig set to take my 9 mm off distal and 5 degrees in valgus was placed into position.  The pins were placed.  The cut on the distal femur was then performed with no issues.      Attention was turned to the tibia.  ACL was excised.  A posterior retractor was placed and the tibia was presented.  An extramedullary tibia device was utilized.  It was set to take 4 mm off the low side, which was medial.  With care to recreate the appropriate slope and varus valgus alignment, the jig was pinned into position.  With the collateral ligaments and posterior neurovascular structures protected, the cut on the proximal tibia was performed with no issues.  The knee was brought out to extension.  A spacer block was placed and showed a balanced resection in full extension.  A drop gavi was placed, which showed normal alignment.  The knee was flexed up to 90 degrees.  A tibial baseplate was placed and again, a drop gavi was placed, which showed normal alignment.  The retractors were placed.  The sizing guide was placed on the distal femur.  The rotation was based on  Conway line and the epicondylar axis.  With this pinned it measured a size 6.  The size 6, 4-in-1 block was placed.  The 90-degree flexion space under the block was consistent with her extension space.  The 4-in-1 cuts were then performed with no issues.  This was followed by the box cut.  Excess bone and meniscal remnants were removed.  Trial components were placed.  She had 0-135 degrees of motion.  The patella tracked with no a no-thumbs technique through the full arc of motion and the knee was balanced through the full arc of motion.  The patella was then resurfaced back down to anatomic dimensions using a freehand technique and again taken through full range of motion showing normal tracking.  The tibia and femur were then prepared.  The final components as listed above were cemented in position, held in approximately 20 degrees of flexion until the cement cured.  During this process, a 3-minute dilute Betadine lavage was performed followed with pulse lavage.  Excess bone and cement fragments had been removed.  The arthrotomy was then closed with 0 Vicryl, followed with 2-0 Vicryl subcutaneous, followed with a running Monocryl suture and skin glue.  A sterile bandage was applied.      She was subsequently transferred to the PACU in stable condition.  She will be brought to the hospital for orthopedic care, DVT prophylaxis and pain management.    Sahil Cottrell DO        D: 2022   T: 2022   MT: LOGAN    Name:     RUTH ANN MOORE  MRN:      [id number]        Account:        [id number]   :      1965           Procedure Date: 2022     Document: [id number]

## 2022-02-09 NOTE — PROGRESS NOTES
02/09/22 1500   Quick Adds   Type of Visit Initial PT Evaluation       Present no   Living Environment   People in home significant other   Current Living Arrangements house   Home Accessibility stairs within home  (split entry)   Number of Stairs, Within Home, Primary other (see comments)  (8 going up stairs, 6 going down)   Stair Railings, Within Home, Primary railing on left side (ascending)  (railing on L going down stairs (at split entry))   Transportation Anticipated family or friend will provide   Self-Care   Usual Activity Tolerance excellent   Current Activity Tolerance poor   Regular Exercise Other (see comments)  [place]   Equipment Currently Used at Home cane, straight;grab bar, toilet;shower chair;walker, tanesha   Disability/Function   Hearing Difficulty or Deaf no   Wear Glasses or Blind yes   Vision Management wears glasses   Concentrating, Remembering or Making Decisions Difficulty no   Difficulty Communicating no   Difficulty Eating/Swallowing no   Walking or Climbing Stairs Difficulty yes   Walking or Climbing Stairs stair climbing difficulty, assistance 1 person   Mobility Management uses fww or cane   Dressing/Bathing Difficulty yes   Dressing/Bathing bathing difficulty, assistance 1 person   Dressing/Bathing Management  helped at times due to knee pain   Toileting issues no   Doing Errands Independently Difficulty (such as shopping) no   Fall history within last six months no   General Information   Onset of Illness/Injury or Date of Surgery 02/09/22   Referring Physician Dr. Elia Cottrell, DO   Patient/Family Therapy Goals Statement (PT) Go home   Pertinent History of Current Problem (include personal factors and/or comorbidities that impact the POC) 56 y.o. female presents POD#) L TKA. Pt. presents with longstanding progressive knee pain. Has trialed conservative care including OTC medication, steroid injection, activity modification, bracing,  and no improvement. Pt. lives with her  in a split level home [place] where she cares for horses and their cats and dogs. Pt. demonstrates limited ROM, decreased strength, edema, and pain at affected area. These deficits contributing to difficulty ambulating, transferring in/out of bed, lower body dressing, and negotiating stairs. Pt. will benefit from skilled PT to address these deficits in order to facilitate safe discharge home.   Existing Precautions/Restrictions other (see comments)  (POD) L TKA)   Weight-Bearing Status - LUE full weight-bearing   Weight-Bearing Status - RUE full weight-bearing   Weight-Bearing Status - LLE weight-bearing as tolerated   Weight-Bearing Status - RLE full weight-bearing   Cognition   Orientation Status (Cognition) oriented x 3   Affect/Mental Status (Cognition) WNL   Follows Commands (Cognition) WNL   Pain Assessment   Patient Currently in Pain Yes, see Vital Sign flowsheet   Integumentary/Edema   Integumentary/Edema other (describe)   Integumentary/Edema Comments Edema present due to surgical procedure   Posture    Posture Forward head position;Protracted shoulders   Range of Motion (ROM)   ROM Quick Adds ROM deficits secondary to surgical procedure   Strength   Manual Muscle Testing Quick Adds Deficits observed during functional mobility   Bed Mobility   Bed Mobility supine-sit;sit-supine   Supine-Sit Diller (Bed Mobility) minimum assist (75% patient effort)  (Requires assist with L LE)   Sit-Supine Diller (Bed Mobility) minimum assist (75% patient effort)  (requires assist with L LE)   Impairments Contributing to Impaired Bed Mobility pain;decreased ROM   Assistive Device (Bed Mobility)   (None)   Transfers   Transfers sit-stand transfer   Transfer Safety Concerns Noted decreased weight-shifting ability   Impairments Contributing to Impaired Transfers pain;decreased ROM;decreased sensation   Sit-Stand Transfer   Sit-Stand Diller (Transfers)  contact guard;supervision   Assistive Device (Sit-Stand Transfers) walker, front-wheeled   Sit/Stand Transfer Comments CGA for safety upon standing for the first time after surgery   Gait/Stairs (Locomotion)   [place] Level (Gait) contact guard;supervision;verbal cues   Assistive Device (Gait) walker, front-wheeled   Distance in Feet (Required for LE Total Joints) 150   Pattern (Gait) 3-point;step-to   Deviations/Abnormal Patterns (Gait) antalgic;gait speed decreased;stride length decreased   Maintains Weight-bearing Status (Gait) able to maintain   Balance   Balance other (describe)   Sitting Balance: Static normal balance   Sit-to-Stand Balance good balance   Standing Balance: Static good balance   Balance Quick Add Sitting balance: Static;Sit to stand balance;Standing balance: static   Sensory Examination   Sensory Perception patient reports no sensory changes   Sensory Perception Comments Decreased L LE due to nerve block   Coordination   Coordination no deficits were identified   Muscle Tone   Muscle Tone no deficits were identified   Clinical Impression   Criteria for Skilled Therapeutic Intervention yes, treatment indicated   PT Diagnosis (PT) Gait disturbance, decreased L knee ROM, decreased strength, edema   Influenced by the following impairments s/p L TKA   Functional limitations due to impairments Walking, transfers, stairs, squatting, kneeling   Clinical Presentation Stable/Uncomplicated   Clinical Presentation Rationale Clinical judgement   Clinical Decision Making (Complexity) low complexity   Therapy Frequency (PT) Daily   Predicted Duration of Therapy Intervention (days/wks) 1-2 days   Planned Therapy Interventions (PT) gait training;balance training;bed mobility training;home exercise program;neuromuscular re-education;patient/family education;ROM (range of motion);stair training;strengthening;stretching   Anticipated Equipment Needs at Discharge (PT) other (see comments)  (Pt. has equipment  at home)   Risk & Benefits of therapy have been explained evaluation/treatment results reviewed;care plan/treatment goals reviewed;risks/benefits reviewed;participants voiced agreement with care plan;participants included;patient;spouse/significant other   Clinical Impression Comments 56 y.o. female presents POD#0 L TKA. Pt. demonstrates decreased ROM, decreased strength, pain and edema at affected area. These deficits contributing to difficulty with ambulation, transfers, lower body dressing, and prolonged positioning. Pt. will benefit from skilled PT to address these deficits in order to facilitate safe discharge home with spouse.    PT Discharge Planning    PT Discharge Recommendation (DC Rec) home;home with outpatient physical therapy   PT Rationale for DC Rec Pt. from home with her , split entry on [employer] [address] [employer]. Pt. has a fww and SEC at home, she utilized SEC prior to surgery. Pt. mobilizes with decent strength, requires Khushi with transferring L LE in/out of bed, able to transfer sit<>stand SBA, and ambulates CGA with fww. Anticipate pt. will do well with discharge home with her . Will benefit from OP PT to further address post op restrictions and facilitate improved functional abilities.   PT Brief overview of current status  Khushi with L LE with bed mobility, SBA with sit to/from stand with walker, CGA/SBA with ambulating with fww.   Total Evaluation Time   Total Evaluation Time (Minutes) 16   Thank you for your referral.   Farrah Layne, PT, DPT    [facility]   O: [phone]  E: [email or web address]

## 2022-02-09 NOTE — ANESTHESIA PROCEDURE NOTES
Adductor canal Procedure Note    Pre-Procedure   Staff -        CRNA: Melo Vernon APRN CRNA       Other Anesthesia Staff: Maye Vargas       Performed By: CRNA       Location: post-op       Procedure Start/Stop Times: 2/9/2022 12:20 PM and 2/9/2022 12:30 PM       Pre-Anesthestic Checklist: patient identified, IV checked, site marked, risks and benefits discussed, informed consent, monitors and equipment checked, pre-op evaluation, at physician/surgeon's request and post-op pain management  Timeout:       Correct Patient: Yes        Correct Procedure: Yes        Correct Site: Yes        Correct Position: Yes        Correct Laterality: Yes        Site Marked: Yes  Procedure Documentation  Procedure: Adductor canal       Laterality: left       Patient Position: supine       Patient Prep/Sterile Barriers: sterile gloves, mask       Skin prep: Chloraprep       Local skin infiltrated with 1 mL of 1% lidocaine.        Needle Type: insulated       Needle Gauge: 22.        Needle Length (millimeters): 100        Ultrasound guided       1. Ultrasound was used to identify targeted nerve, plexus, vascular marker, or fascial plane and place a needle adjacent to it in real-time.       2. Ultrasound was used to visualize the spread of anesthetic in close proximity to the above referenced structure.       4. The visualized anatomic structures appeared normal.       5. There were no apparent abnormal pathologic findings.    Assessment/Narrative         The placement was negative for: blood aspirated, painful injection and site bleeding       Paresthesias: No.    Test dose of mL at.         Test dose negative, 3 minutes after injection, for signs of intravascular, subdural, or intrathecal injection.     Bolus given via needle..        Secured via.        Insertion/Infusion Method: Single Shot       Complications: none       Injection made incrementally with aspirations every 5 mL.     Comments:  Pt tolerated the procedure  well.  No complications noted.  Will follow if needed.

## 2022-02-09 NOTE — ANESTHESIA PREPROCEDURE EVALUATION
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      Past Surgical History:   Procedure Laterality Date     CARPAL TUNNEL RELEASE RT/LT      right wrist     COLONOSCOPY N/A 2021    Procedure: COLONOSCOPY, WITH POLYPECTOMY;  Surgeon: Celso Shipley MD;  Location: [facility]     EXPLORE SPINE, REMOVE HARDWARE, COMBINED  2014    Procedure: COMBINED EXPLORE SPINE, REMOVE HARDWARE;  Hardware Removal Lumbar 4-Sacral 1, Exploration of Fusion;  Surgeon: Brenton Ashford MD;  Location: [facility]     FUSION LUMBAR ANTERIOR THREE+ LEVELS       [facility] EXCISION TENDON SHEATH LESION, HAND/FINGR  2006    Right middle finger flexor sheath MP joint.     HC EXCISION TENDON SHEATH LESION, HAND/FINGR  06    Right middle finger     [facility] LAPAROSCOPY, SURGICAL; CHOLECYSTECTOMY  2004    Cholecystectomy, Laparoscopic     HC REMOVAL OF OVARY/TUBE(S)      left ovary removed secondary to cyst     HC REMOVAL OF TONSILS,12+ Y/O      Tonsils 12+y.o.     [facility] REVISE MEDIAN N/CARPAL TUNNEL SURG  2005    Left     Z LAP,ABD/PERIT/OMENTUM,UNLIST  04/12/10    pelvic adhesion     [facility] TOTAL ABDOM HYSTERECTOMY  2004    TAHRSAMEERA, Cystoscopy.     [facility] COLONOSCOPY W BIOPSY  04/26/10     [facility] COLONOSCOPY W/WO BRUSH/WASH  2005     [facility] UGI ENDOSCOPY,  SIMPLE EXAM  5/25/2005      Allergies   Allergen Reactions     Aspirin Swelling     Erythromycin Ethylsuccinate GI Disturbance     gi upset     Ibuprofen Swelling     Naproxen      Nsaids Swelling     Zelnorm [Tegaserod Maleate]       Social History     Tobacco Use     Smoking status: Current Every Day Smoker     Packs/day: 0.25     Years: 23.00     Pack years: 5.75     Smokeless tobacco: Former User     Quit date: 1/19/2013   Substance Use Topics     Alcohol use: Yes     Alcohol/week: 0.0 standard drinks     Comment: nightly      Wt Readings from Last 1 Encounters:   02/07/22 73 kg (161 lb)        Anesthesia Evaluation   Pt has had prior anesthetic. Type: General and MAC.    No history of anesthetic complications       ROS/MED HX  ENT/Pulmonary:     (+) tobacco use, Current use,     Neurologic:  - neg neurologic ROS     Cardiovascular:     (+) Dyslipidemia -----    METS/Exercise Tolerance:     Hematologic:  - neg hematologic  ROS     Musculoskeletal: Comment: Degenerative disc disease  Pt has had spinal surgery and has a back stimulator in.    Pt requesting a general anesthestic  (+) arthritis,     GI/Hepatic: Comment: Hx of intestinal obstruction   (-) GERD   Renal/Genitourinary:  - neg Renal ROS     Endo:     (+) thyroid problem, hypothyroidism,     Psychiatric/Substance Use:     (+) psychiatric history depression and anxiety     Infectious Disease:  - neg infectious disease ROS     Malignancy:  - neg malignancy ROS     Other:  - neg other ROS          Physical Exam    Airway  airway exam normal      Mallampati: II   TM distance: > 3 FB   Neck ROM: full   Mouth opening: > 3 cm    Respiratory Devices and Support         Dental  no notable dental history         Cardiovascular   cardiovascular exam normal       Rhythm and rate: regular and normal     Pulmonary   pulmonary exam normal        breath sounds clear to auscultation           OUTSIDE LABS:  CBC:   Lab Results   Component Value Date    WBC 5.6 02/04/2022     WBC 7.1 09/16/2021    HGB 14.7 02/04/2022    HGB 13.6 09/16/2021    HCT 44.5 02/04/2022    HCT 39.9 09/16/2021     02/04/2022     09/16/2021     BMP:   Lab Results   Component Value Date     02/04/2022     06/28/2021    POTASSIUM 4.3 02/04/2022    POTASSIUM 3.9 06/28/2021    CHLORIDE 103 02/04/2022    CHLORIDE 107 06/28/2021    CO2 30 02/04/2022    CO2 28 06/28/2021    BUN 14 02/04/2022    BUN 13 06/28/2021    CR 0.62 02/04/2022    CR 0.70 06/28/2021     (H) 02/04/2022    GLC 95 06/28/2021     COAGS: No results found for: PTT, INR, FIBR  POC:   Lab Results   Component Value Date    HCG Negative 04/12/2004     HEPATIC:   Lab Results   Component Value Date    ALBUMIN 3.9 10/04/2019    PROTTOTAL 7.2 10/04/2019    ALT 25 10/04/2019    AST 18 10/04/2019    ALKPHOS 79 10/04/2019    BILITOTAL 0.3 10/04/2019     OTHER:   Lab Results   Component Value Date    COURTNEY 9.2 02/04/2022    MAG 2.2 08/03/2008    LIPASE 101 02/15/2005    TSH 1.70 06/28/2021    T4 0.93 12/21/2005    CRP 4.5 12/05/2006    SED 7 10/04/2019       Anesthesia Plan    ASA Status:  2   NPO Status:  NPO Appropriate    Anesthesia Type: General.     - Airway: LMA   Induction: Propofol.   Maintenance: Balanced.        Consents    Anesthesia Plan(s) and associated risks, benefits, and realistic alternatives discussed. Questions answered and patient/representative(s) expressed understanding.    - Discussed:     - Discussed with:  Patient    Use of blood products discussed: No .     Postoperative Care    Pain management: IV analgesics, Oral pain medications.   PONV prophylaxis: Ondansetron (or other 5HT-3), Dexamethasone or Solumedrol     Comments:    Other Comments: Pt has had spinal surgery and has a back stimulator in.    Pt requesting a general anesthestic  The risks and benefits of general and Adductor blk anesthesia, and the alternatives where applicable, have been discussed with the patient, and they wish to proceed.             Melo Vernon, APRN CRNA

## 2022-02-09 NOTE — BRIEF OP NOTE
[facility] Brief Operative Note    Pre-operative diagnosis: Primary osteoarthritis of left knee    Post-operative diagnosis: Same   Procedure: Procedure(s):  left total knee replacement   Surgeon:  Anesthesia: Sahil Cottrell DO  General   Assistant(s): Elia Gaviria APRN, CNP, DNP (A advanced practice provider was necessary for his expertise, exposure and surgical assistance throughout the case.)   Estimated blood loss:  Tourniquet time/pressure:  Urine output:  Fluids: Less than 10 ml  80 minutes at 300 mmHg  na  800 ml Crystalloids   Specimens: None   Findings: left knee primary osteoarthritis [id number]     Champ Cottrell D.O.      Implant Name Type Inv. Item Serial No.  Lot No. LRB No. Used Action   BONE CEMENT GENTAMYCIN SMART SET [facility] 5450- - [id number] Cement, Bone BONE CEMENT GENTAMYCIN SMART SET [facility] 5450-  [employer] [facility]- [id number] Left 2 Implanted   attune patella medialized dome 35mm cemented AOX    Depuy [id number] Left 1 Implanted   attune tibial insert rotating platform posterior stabilized size 6 5mm AOX    Depuy [id number] Left 1 Implanted   Attune Knee System tibial base rotating platform size 4 cemented    Depuy [id number] Left 1 Implanted   attune femoral posterior stabilized narrow size 6N left cemented     Depuy [id number] Left 1 Implanted

## 2022-02-09 NOTE — OR NURSING
Transfer from  PACU to Room 273.  Transferred to bed via Glyder Mat.    S: 55 y/o female  S/P Left Total Knee Replacement       Anesthesia Type:  General       Surgeon:  Dr. Cottrell       Allergies:  See Medication Reconciliation Record       DNR: No    B:  Pertinent Medical History:   Past Medical History:   Diagnosis Date     Anxiety      Benign neoplasm of other specified sites of skin 4/6/2006    Finger - right third finger proximal palmar surface     Degenerative disk disease 12/18/12    anterior exposure of L4-L5 and L5-S1 disk spaces for fusion     Depressive disorder      Gout, unspecified      Other and unspecified ovarian cyst      Unspecified hypothyroidism years     Unspecified intestinal obstruction 02/02/2005    Partial bowel obstruction, Obstipation.             Surgical History:    Past Surgical History:   Procedure Laterality Date     CARPAL TUNNEL RELEASE RT/LT  2005    right wrist     COLONOSCOPY N/A 8/16/2021    Procedure: COLONOSCOPY, WITH POLYPECTOMY;  Surgeon: Celso Shipley MD;  Location: [facility]     EXPLORE SPINE, REMOVE HARDWARE, COMBINED  4/7/2014    Procedure: COMBINED EXPLORE SPINE, REMOVE HARDWARE;  Hardware Removal Lumbar 4-Sacral 1, Exploration of Fusion;  Surgeon: Brenton Ashford MD;  Location: [facility]     FUSION LUMBAR ANTERIOR THREE+ LEVELS       HC EXCISION TENDON SHEATH LESION, HAND/FINGR  04/17/2006    Right middle finger flexor sheath MP joint.     HC EXCISION TENDON SHEATH LESION, HAND/FINGR  08/07/06    Right middle finger     [facility] LAPAROSCOPY, SURGICAL; CHOLECYSTECTOMY  4/12/2004    Cholecystectomy, Laparoscopic     HC REMOVAL OF OVARY/TUBE(S)      left ovary removed secondary to cyst     HC REMOVAL OF TONSILS,12+ Y/O      Tonsils 12+y.o.     HC REVISE MEDIAN N/CARPAL TUNNEL SURG  8/22/2005    Left     [facility] LAP,ABD/PERIT/OMENTUM,UNLIST  04/12/10    pelvic adhesion     [facility] TOTAL ABDOM HYSTERECTOMY  09/08/2004    TAHRSAMEERA, Cystoscopy.     [facility] COLONOSCOPY W BIOPSY  04/26/10      [facility] COLONOSCOPY W/WO BRUSH/WASH  5/25/2005     [facility] UGI ENDOSCOPY, SIMPLE EXAM  5/25/2005         A:  EBL: less than 10 mL        IVF:  200 mL        UOP:  None.  Bladder scanned for 100 mL        NPO:  ___Yes _x__No         Vomiting:  ___Yes _x__No         Drainage: None        Skin Integrity: Normal        RFO: ___Yes_x__No (identify item if present)        SSI Patient?  _x__Yes___No         Brace/sling/equipment:  _x__Yes___No --knee immobilizer         See PACU record for ongoing assessment, vital signs and pain assessment.    R: Post-Op vitals and assessments as ordered/indicated per patient's condition.       Follow Post-Op orders and notify Physician prn.       Continue to involve patient/family in plan of care and discharge planning.       Reinforce Pre-Operative education.       Implement skin safety interventions as appropriate.    Name: Lianne Power RN

## 2022-02-09 NOTE — INTERVAL H&P NOTE
This H&P has been reviewed and there are no clinically significant changes in the patient s condition.  The patient was evaluated by myself as well as Kylie Coffman NP prior to surgery. The Patient is approved for the surgery and the stated surgical procedure is still clinically indicated.

## 2022-02-09 NOTE — PLAN OF CARE
S-(situation): Patient arrives to room 273 via cart from PACU at 1400 and was transferred to bed per air tolu.    B-(background): Left TKA    A-(assessment): Rating pain 10/10 in left knee. Ace wrap dressing CDI and immobilizer in place. Good color and movement to left toes but c/o numbness as yet. Oriented x4 with oxygen on at 2 liters per NC and IV fluids infusing. Clear lungs. Afebrile.  arrived to room shortly after pt arrived. Has eczema with outbreak areas to right forehead and elbows.     R-(recommendations): Orders reviewed with patient and . Will monitor patient per MD orders.     Inpatient nursing criteria listed below were met:    Health care directives status obtained and documented: Yes; no written directives, but full code.   SCD's Documented: Yes  Skin issues/needs documented:Yes  Isolation addressed and Signage used: NA  Fall Prevention: Care plan updated Yes Education given and documented Yes  Care Plan initiated and Co-Morbidities added: Yes  Education Assessment documented:Yes  Admission Education Documented: yes  If present CAUTI/CLABI Education done: NA  New medication patient education completed and documented (Possible Side Effects of Common Medications handout): Yes  Allergies Reviewed: Yes  Admission Medication Reconciliation completed: Yes  Home medications if not able to send immediately home with family stored here: NA  Reminder note placed in discharge instructions regarding home meds: NA  Individualized care needs/preferences addressed and charted: Yes

## 2022-02-10 ENCOUNTER — APPOINTMENT (OUTPATIENT)
Dept: PHYSICAL THERAPY | Facility: CLINIC | Age: 57
End: 2022-02-10
Attending: ORTHOPAEDIC SURGERY
Payer: COMMERCIAL

## 2022-02-10 LAB
FASTING STATUS PATIENT QL REPORTED: YES
GLUCOSE BLD-MCNC: 122 MG/DL (ref 70–99)
HGB BLD-MCNC: 12.5 G/DL (ref 11.7–15.7)
HOLD SPECIMEN: NORMAL
PHOSPHATE SERPL-MCNC: 2.7 MG/DL (ref 2.5–4.5)

## 2022-02-10 PROCEDURE — 84100 ASSAY OF PHOSPHORUS: CPT | Performed by: ORTHOPAEDIC SURGERY

## 2022-02-10 PROCEDURE — 36415 COLL VENOUS BLD VENIPUNCTURE: CPT | Performed by: ORTHOPAEDIC SURGERY

## 2022-02-10 PROCEDURE — 250N000011 HC RX IP 250 OP 636: Performed by: NURSE PRACTITIONER

## 2022-02-10 PROCEDURE — 250N000013 HC RX MED GY IP 250 OP 250 PS 637: Performed by: ORTHOPAEDIC SURGERY

## 2022-02-10 PROCEDURE — 82947 ASSAY GLUCOSE BLOOD QUANT: CPT | Performed by: ORTHOPAEDIC SURGERY

## 2022-02-10 PROCEDURE — 97530 THERAPEUTIC ACTIVITIES: CPT | Mod: GP

## 2022-02-10 PROCEDURE — 36415 COLL VENOUS BLD VENIPUNCTURE: CPT | Performed by: NURSE PRACTITIONER

## 2022-02-10 PROCEDURE — 85018 HEMOGLOBIN: CPT | Performed by: NURSE PRACTITIONER

## 2022-02-10 PROCEDURE — 250N000011 HC RX IP 250 OP 636: Performed by: ORTHOPAEDIC SURGERY

## 2022-02-10 PROCEDURE — 250N000013 HC RX MED GY IP 250 OP 250 PS 637: Performed by: NURSE PRACTITIONER

## 2022-02-10 PROCEDURE — 83735 ASSAY OF MAGNESIUM: CPT | Performed by: ORTHOPAEDIC SURGERY

## 2022-02-10 RX ORDER — FLUMAZENIL 0.1 MG/ML
0.2 INJECTION, SOLUTION INTRAVENOUS
Status: DISCONTINUED | OUTPATIENT
Start: 2022-02-10 | End: 2022-02-11 | Stop reason: HOSPADM

## 2022-02-10 RX ORDER — MORPHINE SULFATE 15 MG/1
15 TABLET, FILM COATED, EXTENDED RELEASE ORAL EVERY 12 HOURS SCHEDULED
Status: DISCONTINUED | OUTPATIENT
Start: 2022-02-10 | End: 2022-02-11 | Stop reason: HOSPADM

## 2022-02-10 RX ORDER — OLANZAPINE 5 MG/1
5-10 TABLET, ORALLY DISINTEGRATING ORAL EVERY 6 HOURS PRN
Status: DISCONTINUED | OUTPATIENT
Start: 2022-02-10 | End: 2022-02-11 | Stop reason: HOSPADM

## 2022-02-10 RX ORDER — HALOPERIDOL 5 MG/ML
2.5-5 INJECTION INTRAMUSCULAR EVERY 6 HOURS PRN
Status: DISCONTINUED | OUTPATIENT
Start: 2022-02-10 | End: 2022-02-11 | Stop reason: HOSPADM

## 2022-02-10 RX ORDER — CLONIDINE HYDROCHLORIDE 0.1 MG/1
0.1 TABLET ORAL EVERY 8 HOURS
Status: DISCONTINUED | OUTPATIENT
Start: 2022-02-10 | End: 2022-02-11 | Stop reason: HOSPADM

## 2022-02-10 RX ORDER — DIAZEPAM 5 MG
10 TABLET ORAL EVERY 30 MIN PRN
Status: DISCONTINUED | OUTPATIENT
Start: 2022-02-10 | End: 2022-02-11 | Stop reason: HOSPADM

## 2022-02-10 RX ORDER — GABAPENTIN 100 MG/1
100 CAPSULE ORAL 3 TIMES DAILY
Status: DISCONTINUED | OUTPATIENT
Start: 2022-02-10 | End: 2022-02-11 | Stop reason: HOSPADM

## 2022-02-10 RX ORDER — DIAZEPAM 5 MG
5 TABLET ORAL ONCE
Status: COMPLETED | OUTPATIENT
Start: 2022-02-10 | End: 2022-02-10

## 2022-02-10 RX ORDER — HYDROMORPHONE HYDROCHLORIDE 1 MG/ML
0.3 INJECTION, SOLUTION INTRAMUSCULAR; INTRAVENOUS; SUBCUTANEOUS ONCE
Status: COMPLETED | OUTPATIENT
Start: 2022-02-10 | End: 2022-02-10

## 2022-02-10 RX ORDER — HYDROMORPHONE HYDROCHLORIDE 2 MG/1
2-4 TABLET ORAL EVERY 4 HOURS PRN
Status: DISCONTINUED | OUTPATIENT
Start: 2022-02-10 | End: 2022-02-11 | Stop reason: HOSPADM

## 2022-02-10 RX ORDER — LEVOTHYROXINE SODIUM 100 UG/1
100 TABLET ORAL DAILY
Status: DISCONTINUED | OUTPATIENT
Start: 2022-02-10 | End: 2022-02-11 | Stop reason: HOSPADM

## 2022-02-10 RX ORDER — FOLIC ACID 1 MG/1
1 TABLET ORAL DAILY
Status: DISCONTINUED | OUTPATIENT
Start: 2022-02-10 | End: 2022-02-11 | Stop reason: HOSPADM

## 2022-02-10 RX ORDER — MULTIPLE VITAMINS W/ MINERALS TAB 9MG-400MCG
1 TAB ORAL DAILY
Status: DISCONTINUED | OUTPATIENT
Start: 2022-02-10 | End: 2022-02-11 | Stop reason: HOSPADM

## 2022-02-10 RX ORDER — DIAZEPAM 10 MG/2ML
5-10 INJECTION, SOLUTION INTRAMUSCULAR; INTRAVENOUS EVERY 30 MIN PRN
Status: DISCONTINUED | OUTPATIENT
Start: 2022-02-10 | End: 2022-02-11 | Stop reason: HOSPADM

## 2022-02-10 RX ADMIN — OXYCODONE HYDROCHLORIDE 10 MG: 5 TABLET ORAL at 09:45

## 2022-02-10 RX ADMIN — ACETAMINOPHEN 975 MG: 325 TABLET, FILM COATED ORAL at 21:55

## 2022-02-10 RX ADMIN — HYDROMORPHONE HYDROCHLORIDE 4 MG: 2 TABLET ORAL at 18:12

## 2022-02-10 RX ADMIN — RIVAROXABAN 10 MG: 10 TABLET, FILM COATED ORAL at 08:22

## 2022-02-10 RX ADMIN — MORPHINE SULFATE 15 MG: 15 TABLET, EXTENDED RELEASE ORAL at 19:47

## 2022-02-10 RX ADMIN — SENNOSIDES AND DOCUSATE SODIUM 1 TABLET: 50; 8.6 TABLET ORAL at 08:22

## 2022-02-10 RX ADMIN — HYDROMORPHONE HYDROCHLORIDE 4 MG: 2 TABLET ORAL at 12:28

## 2022-02-10 RX ADMIN — CLONIDINE HYDROCHLORIDE 0.1 MG: 0.1 TABLET ORAL at 21:54

## 2022-02-10 RX ADMIN — CYCLOBENZAPRINE HYDROCHLORIDE 10 MG: 10 TABLET, FILM COATED ORAL at 04:01

## 2022-02-10 RX ADMIN — GABAPENTIN 100 MG: 100 CAPSULE ORAL at 20:28

## 2022-02-10 RX ADMIN — SERTRALINE HYDROCHLORIDE 100 MG: 100 TABLET ORAL at 05:42

## 2022-02-10 RX ADMIN — HYDROMORPHONE HYDROCHLORIDE 0.2 MG: 0.2 INJECTION, SOLUTION INTRAMUSCULAR; INTRAVENOUS; SUBCUTANEOUS at 10:30

## 2022-02-10 RX ADMIN — HYDROXYZINE HYDROCHLORIDE 25 MG: 25 TABLET ORAL at 13:30

## 2022-02-10 RX ADMIN — FOLIC ACID 1 MG: 1 TABLET ORAL at 21:54

## 2022-02-10 RX ADMIN — DIAZEPAM 5 MG: 5 TABLET ORAL at 17:22

## 2022-02-10 RX ADMIN — CEFAZOLIN 1 G: 1 INJECTION, POWDER, FOR SOLUTION INTRAMUSCULAR; INTRAVENOUS at 01:29

## 2022-02-10 RX ADMIN — OXYCODONE HYDROCHLORIDE 10 MG: 5 TABLET ORAL at 05:29

## 2022-02-10 RX ADMIN — LEVOTHYROXINE SODIUM 100 MCG: 100 TABLET ORAL at 05:42

## 2022-02-10 RX ADMIN — FAMOTIDINE 20 MG: 20 TABLET ORAL at 20:28

## 2022-02-10 RX ADMIN — OXYCODONE HYDROCHLORIDE 10 MG: 5 TABLET ORAL at 01:26

## 2022-02-10 RX ADMIN — GABAPENTIN 100 MG: 100 CAPSULE ORAL at 14:07

## 2022-02-10 RX ADMIN — SENNOSIDES AND DOCUSATE SODIUM 1 TABLET: 50; 8.6 TABLET ORAL at 20:28

## 2022-02-10 RX ADMIN — DIAZEPAM 10 MG: 5 TABLET ORAL at 21:54

## 2022-02-10 RX ADMIN — HYDROMORPHONE HYDROCHLORIDE 0.3 MG: 1 INJECTION, SOLUTION INTRAMUSCULAR; INTRAVENOUS; SUBCUTANEOUS at 14:07

## 2022-02-10 RX ADMIN — THIAMINE HCL TAB 100 MG 100 MG: 100 TAB at 21:54

## 2022-02-10 RX ADMIN — ACETAMINOPHEN 975 MG: 325 TABLET, FILM COATED ORAL at 13:30

## 2022-02-10 RX ADMIN — HYDROXYZINE HYDROCHLORIDE 25 MG: 25 TABLET ORAL at 01:34

## 2022-02-10 RX ADMIN — ACETAMINOPHEN 975 MG: 325 TABLET, FILM COATED ORAL at 05:30

## 2022-02-10 RX ADMIN — HYDROMORPHONE HYDROCHLORIDE 0.2 MG: 0.2 INJECTION, SOLUTION INTRAMUSCULAR; INTRAVENOUS; SUBCUTANEOUS at 04:51

## 2022-02-10 RX ADMIN — HYDROMORPHONE HYDROCHLORIDE 0.2 MG: 0.2 INJECTION, SOLUTION INTRAMUSCULAR; INTRAVENOUS; SUBCUTANEOUS at 04:02

## 2022-02-10 RX ADMIN — CYCLOBENZAPRINE HYDROCHLORIDE 10 MG: 10 TABLET, FILM COATED ORAL at 11:21

## 2022-02-10 RX ADMIN — HYDROXYZINE HYDROCHLORIDE 25 MG: 25 TABLET ORAL at 08:22

## 2022-02-10 RX ADMIN — FAMOTIDINE 20 MG: 20 TABLET ORAL at 08:22

## 2022-02-10 RX ADMIN — POLYETHYLENE GLYCOL 3350 17 G: 17 POWDER, FOR SOLUTION ORAL at 08:22

## 2022-02-10 RX ADMIN — MULTIPLE VITAMINS W/ MINERALS TAB 1 TABLET: TAB at 21:54

## 2022-02-10 NOTE — PROGRESS NOTES
"Received word patient having continued severe pain.    Saw and examined patient.    Patient sitting in bed, tearful.  Reports severe pain knee. Pain is centrally located to the knee. Reports some pain to thigh and along leg but secondary to the knee.  Denies any respiratory issues. Voiding, tolerating oral intakes. Denies any tremors, hallucinations, or other withdraw symptoms.  Patient reports drinks about 1-3 glasses of wine a day, states equivalent to about 1/3 to 1 wine bottle a night.  Denies any previous history of withdraws.   Was on tramadol for a short time prior to surgery but no chronic narcotic use otherwise.   Patient has had no sedative effects, no sleep today and no issues from the medications.     BP (!) 159/74 (BP Location: Left arm)   Pulse 100   Temp 96.9  F (36.1  C) (Oral)   Resp 20   Ht 1.549 m (5' 1\")   Wt 79.6 kg (175 lb 7.8 oz)   LMP 01/13/2004   SpO2 98%   BMI 33.16 kg/m       Patient is alert and orient x 4 in distress.  Tearful.  There is expected swelling to the knee, and none to the thigh and calf. DENICE stocking in place. Significant tenderness to the knee, mild tenderness to the anterior thigh and where the tourniquet was. and very mild tenderness along the calf. The compartments are compressible both thigh and calf. No focal tenderness to the calf. No masses, erythema or increased warmth. No pain with df/pf of the pain. No pain with flexion/extension of the great toe/EHL.  Sensation intact to the thigh, knee, calf, and foot. Skin is pink warm dry.  Pulse 2+ to the right foot.  aquacell is clean dry intact without any evidence of drainage. Patient is able to ambulate.  Voiding adequate.     Plan: patient currently on   gabapentin 100mg TID (started this afternoon)  Tylenol 975mg Q8 hours  Dilaudid 2-4 mg q4 hours PRN  Atarax 25mg  q6 hours PRN  Flexeril 10mg q 8 hours PRN    Discussed with patient. There is no current signs of compartment syndrome. No red flags. Vitally WDL " other than systolic of 159 and pulse of 100 which can be explained by the pain.  She has had no fall or trauma since surgery.  Dressing is CDI without shadowing. Expected level of swelling. She does report 1 bottle of wine a night usage. No evidence of DTs but this could be creating opioid tolerance especially with no sedation from the medication regimen she is on.     Discussed with patient. Discussed with starting on a long acting pain medication, continuing to monitor overnight and see.      Before this, will attempt a one time dose of valium.  This will hopefully aid in relaxation, pain control, and muscle spasms.  Will have nursing contact me after the valium. If it provided good relief, will continue with valium and not order the ms contin.  If the valium is ineffective, will discontinue this and order long acting pain medication.  Patient understands if we do a long acting pain medication it will be very short term.     Also discussed at length with both  and spouse that knee replacement will hurt, and the goal will be tolerable or even semi-tolerable pain.  Not realistic to achieve pain elimination and may only get partial control.  Also had sabrina discussion that mixing alcohol with any narcotics or sedatives can be fatal and explained risk of respiratory depression/arrest.  Recommend patient abstain from alcohol while taking narcotics or sedatives.     By the end of the discussion patient was no longer tearful and appeared in much less distress.  Patient is agreeable to overnight monitoring.  Will monitor ETc02 and SPo2 while on the combinations of medications.      Reviewed UR note.  If not discharging tomorrow AM due to intractable pain will change patient status to in-patient.     Will re-evaluate in the AM.     FLORENCIA Crespo, CNP  Orthopedic Surgery    Addendum: 2/10/22 at 1930:  Valium did not provide any pain relief but did cause some unsteadiness with ambulation.  Valium  discontinue.  Ms contin prescribed instead.     Chas Gaviria APRN, CNP  Orthopedic Surgery

## 2022-02-10 NOTE — UTILIZATION REVIEW
Concurrent stay review; Secondary Review Determination    Under the authority of the Utilization Management Committee, the utilization review process indicated a secondary review on the above patient. The review outcome is based on review of the medical records, discussions with staff, and applying clinical experience noted on the date of the review.    (x) Outpatient/Observation Status Appropriate - Concurrent stay review        RATIONALE FOR DETERMINATION:56-year-old female postop day 1 for left total knee arthroplasty.  Surgery without complications but patient required an extra day in the hospital for adequate pain control prior to discharge.   If patient is found to have refractive pain control issues on 2/11/2022, and at that time would advance patient to inpatient care.     The severity of illness, intensity of service provided, expected LOS and risk for adverse outcome make the care appropriate for outpatient/observation.    This document was produced using voice recognition software    The information on this document is developed by the utilization review team in order for the business office to ensure compliance. This only denotes the appropriateness of proper admission status and does not reflect the quality of care rendered.    The definitions of Inpatient Status and Observation Status used in making the determination above are those provided in the CMS Coverage Manual, Chapter 1 and Chapter 6, section 70.4.    Sincerely,    Dylon Marinelli MD  Utilization Review  Physician Advisor  [facility].

## 2022-02-10 NOTE — ANESTHESIA POSTPROCEDURE EVALUATION
Patient: Elisa Al    Procedure: Procedure(s):  left total knee replacement       Diagnosis:Primary osteoarthritis of left knee [M17.12]  Diagnosis Additional Information: No value filed.    Anesthesia Type:  General    Note:  Disposition: Inpatient   Postop Pain Control: Uneventful            Sign Out: ONGOING pain issues (pt being treated with IV and oral meds)   PONV: No   Neuro/Psych: Uneventful            Sign Out: Acceptable/Baseline neuro status   Airway/Respiratory: Uneventful            Sign Out: Acceptable/Baseline resp. status   CV/Hemodynamics: Uneventful            Sign Out: Acceptable CV status   Other NRE: NONE   DID A NON-ROUTINE EVENT OCCUR? No    Event details/Postop Comments:  Pt was happy with anesthesia care.  She is having pain issues at this point, but new meds are being utilized.  I will follow up with the pt if needed.           Last vitals:  Vitals Value Taken Time   /83 02/09/22 1405   Temp 98  F (36.7  C) 02/09/22 1405   Pulse 119 02/09/22 1405   Resp 15 02/09/22 1405   SpO2 95 % 02/09/22 1405       Electronically Signed By: FLORENCIA Zaragoza CRNA  February 10, 2022  2:37 PM

## 2022-02-10 NOTE — PROVIDER NOTIFICATION
PRIMARY DIAGNOSIS: ACUTE PAIN  OUTPATIENT/OBSERVATION GOALS TO BE MET BEFORE DISCHARGE:  1. Pain Status: No improvement noted. Consider adjustment in pain regimen.  Provider notified.  Pain 9/10 prior to oxycodone administration, no relief noted by patient at pain reassessment.    2. Return to near baseline physical activity: Yes    3. Cleared for discharge by consultants (if involved): N/A    Discharge Planner Nurse   Safe discharge environment identified: Yes  Barriers to discharge: Yes       Entered by: Berta Elizabeth 02/10/2022 10:36 AM     Please review provider order for any additional goals.   Nurse to notify provider when observation goals have been met and patient is ready for discharge.

## 2022-02-10 NOTE — PLAN OF CARE
Reports that pain is much improved from pre-surgery pain. Oxycodone worked well to control pain. Still some numbness in left foot, but good color and wiggles toes easily. Dressing remains CDI. On room air to sat 93%. Has ambulated and voided. Tolerated regular supper. Anxious to sleep as was up most of the night worrying prior to surgery. Will continue to monitor pain, CMS, activity tolerance, dressing.

## 2022-02-10 NOTE — PROGRESS NOTES
Received word from nursing. Oxycodone is not helping with pain.  Struggling with pain. discharge IV dilaudid. Discontinued oxycodone. Ordered PO dilaudid. Will prescribe this upon discharge as well.    FLORENCIA Crespo, CNP  Orthopedic Surgery

## 2022-02-10 NOTE — PROGRESS NOTES
Patient having pain.  She was evaluated reports pain directly over the knee and upper thigh.  She reports the block wore off last night and started having pain afterwards.  She reports no numbness and tingling in the leg.  Unfortunately medications have not been helpful.  She is was switched to oral Dilaudid with no improvement.  She is been on Flexeril Tylenol as well.    Knee is evaluated.  Aquacel in place.  No drainage.  Expected swelling and slight bruising around the knee itself.  No peripheral edema.  There is no calf swelling.  Compartments are soft and compressible.  Sensations intact to light touch through the leg.  Active dorsiflexion plantarflexion is intact.  Range of motion to the knee not tested.  She has 2+ dorsalis pedis and posterior tibialis pulses.    Postop radiographs unremarkable total knee replacement.    Plan:  We will add Neurontin for pain control.  Continue oral Dilaudid.  We will add a bump of IV narcotic x1.

## 2022-02-10 NOTE — PROGRESS NOTES
Patient is in pain 10/10, states current pain regimen is providing no relief. Patient observed restless in bed and tearful.

## 2022-02-10 NOTE — PLAN OF CARE
Problem: Adult Inpatient Plan of Care  Goal: Patient-Specific Goal (Individualized)  Flowsheets  Taken 2/10/2022 0448 by Mikey Mart RN  Individualized Care Needs: Patient likes fan for sleep  Patient-Specific Goals (Include Timeframe): Patient will transition from IV to PO pain medications 2/10  Taken 2/9/2022 1419 by Lianne Power RN  Anxieties, Fears or Concerns: denies  Goal: Absence of Hospital-Acquired Illness or Injury  Intervention: Identify and Manage Fall Risk  Recent Flowsheet Documentation  Taken 2/10/2022 0000 by Mikey Mart RN  Safety Promotion/Fall Prevention: activity supervised  Intervention: Prevent Skin Injury  Recent Flowsheet Documentation  Taken 2/10/2022 0000 by Mikey Mart RN  Body Position: position changed independently  Intervention: Prevent and Manage VTE (Venous Thromboembolism) Risk  Recent Flowsheet Documentation  Taken 2/10/2022 0000 by Mikey Mart RN  VTE Prevention/Management: pneumatic compression device  Taken 2/9/2022 2003 by Mikey Mart RN  VTE Prevention/Management: pneumatic compression device     Problem: Joint Function Impaired (Knee Arthroplasty)  Goal: Optimal Functional Ability  Intervention: Promote Optimal Functional Status  Recent Flowsheet Documentation  Taken 2/10/2022 0000 by Mikey Mart RN  Assistive Device Utilized: walker  Activity Management: ambulated to bathroom     Problem: Ongoing Anesthesia Effects (Knee Arthroplasty)  Goal: Anesthesia/Sedation Recovery  Intervention: Optimize Anesthesia Recovery  Recent Flowsheet Documentation  Taken 2/10/2022 0000 by Mikey Mart RN  Safety Promotion/Fall Prevention: activity supervised  Administration (IS): self-administered  Level Incentive Spirometer (mL): 2500  Number of Repetitions (IS): 5  Patient Tolerance (IS): no adverse signs/symptoms present  Taken 2/9/2022 2003 by Mikey Mart RN  Administration (IS): self-administered  Level Incentive Spirometer (mL): 2500  Number of  Repetitions (IS): 5     Problem: Pain (Knee Arthroplasty)  Goal: Acceptable Pain Control  Intervention: Prevent or Manage Pain  Recent Flowsheet Documentation  Taken 2/9/2022 2112 by Mikey Mart, RN  Pain Management Interventions: medication (see MAR)  Taken 2/9/2022 2003 by Mikey Mart, RN  Pain Management Interventions: medication (see MAR)    Pt is alert and oriented. Feeling has returned to surgical leg, she has good plantar flexion and dorsiflexion. Skin is warm and pulses are good.  She is up with walker about every hour to the bathroom. She has consumed 3 pitchers of water since 1900 and has voided it out, writer asked for her to sip water for awhile to maybe get a break from getting OOB every hour to the bathroom. UOP is excellent. Pain control has not been great early this morning. She has received Oxy, Dilaudid, Flexeril, and Atarax.  Pain is finally down to 5:10, stating this is much better.

## 2022-02-10 NOTE — PROGRESS NOTES
"[facility]  Orthopedics Progress Note           Assessment and Plan:    Assessment:   Post-operative day #1Post-operative day #1 Procedure(s):  left total knee replacement      Plan:   Encourage IS  Start or continue physical therapy  Activity as tolerated  Weight-bear as tolerated.  Discharge from hospital today.  Pain control measures  Advance diet as tolerated  Routine wound care:just prior to discharge RN to replace bulky dressing with sterile aquacell with sterile procedure, place DENICE stocking after dressing change.  DVT Prophylaxis: Xarelto, SCD's, Compression Hose  No acute medical issues.            Interval History:   Doing well.  Continues to improve.  Pain initially was difficult to control after the block wore off late last night, did receive a couple doses of IV dilaudid and oral medications. Has now been 3 hours since the last IV medication and recently got the oral oxycodone. The pain is currently much better now and tolerable. Has been eating, drinking, voiding, use IS and denies breathing issues.  Has been ambulatory.  As long as continues to progress and pain states tolerable will plan to discharge today. Has help at home.            Review of Systems:    The patient denies any chest pain, shortness of breath, excessive pain, fever, chills, purulent drainage from the wound, nausea or vomiting.               Physical Exam:   General: awake, alert, appropriate and in no acute distress  Blood pressure (!) 149/83, pulse 101, temperature 98.9  F (37.2  C), temperature source Oral, resp. rate 14, height 1.549 m (5' 1\"), weight 79.6 kg (175 lb 7.8 oz), last menstrual period 01/13/2004, SpO2 94 %, not currently breastfeeding.  Left knee:  Dressing clean, dry and intact. Surrounding skin intact, no breakdown. Calf is soft, nontender with no significant swelling. Distal neurovascular grossly intact 2+ distal pulse, sensation intact to foot, able to df/pf against resistance..  Compartments " soft and non-tender.   Brisk cap refill.            Data:     Results for orders placed or performed during the hospital encounter of 02/09/22 (from the past 24 hour(s))   XR Knee Port Left 1/2 Views    Narrative    LEFT KNEE PORTABLE ONE TO TWO VIEWS   2/9/2022 12:55 PM     HISTORY: Postop total knee.    COMPARISON: None.    FINDINGS:  There has been a left knee arthroplasty.  Alignment is  anatomic.  Hardware components are well seated and demonstrate no  evidence for periprosthetic fracture or hardware failure.  Gas is seen  in the joint space and in the soft tissues.       Impression    IMPRESSION:  Expected findings status post left knee arthroplasty.     PARESH MANZO MD         SYSTEM ID:  [id number]   Hemoglobin   Result Value Ref Range    Hemoglobin 12.5 11.7 - 15.7 g/dL   Glucose   Result Value Ref Range    Glucose 122 (H) 70 - 99 mg/dL    Patient Fasting > 8hrs? Yes      FLORENCIA Crespo, CNP  Orthopedic Surgery

## 2022-02-10 NOTE — PLAN OF CARE
Problem: Adult Inpatient Plan of Care  Goal: Plan of Care Review  Outcome: Declining  Goal: Patient-Specific Goal (Individualized)  Outcome: Declining  Goal: Absence of Hospital-Acquired Illness or Injury  Outcome: Declining  Intervention: Identify and Manage Fall Risk  Recent Flowsheet Documentation  Taken 2/10/2022 1318 by Rylee Butcher RN  Safety Promotion/Fall Prevention: activity supervised  Intervention: Prevent Skin Injury  Recent Flowsheet Documentation  Taken 2/10/2022 1318 by Rylee Butcher RN  Body Position: supine, head elevated  Intervention: Prevent and Manage VTE (Venous Thromboembolism) Risk  Recent Flowsheet Documentation  Taken 2/10/2022 1318 by Rylee Butcher RN  VTE Prevention/Management: anticoagulant therapy maintained  Goal: Optimal Comfort and Wellbeing  Outcome: Declining  Goal: Readiness for Transition of Care  Outcome: Declining     Problem: Adjustment to Illness (Knee Arthroplasty)  Goal: Optimal Coping  Outcome: Declining     Problem: Bleeding (Knee Arthroplasty)  Goal: Absence of Bleeding  Outcome: Declining     Problem: Bowel Motility Impaired (Knee Arthroplasty)  Goal: Effective Bowel Elimination  Outcome: Declining     Problem: Infection (Knee Arthroplasty)  Goal: Absence of Infection Signs and Symptoms  Outcome: Declining     Problem: Joint Function Impaired (Knee Arthroplasty)  Goal: Optimal Functional Ability  Outcome: Declining     Problem: Ongoing Anesthesia Effects (Knee Arthroplasty)  Goal: Anesthesia/Sedation Recovery  Outcome: Declining  Intervention: Optimize Anesthesia Recovery  Recent Flowsheet Documentation  Taken 2/10/2022 1318 by Rylee Butcher RN  Safety Promotion/Fall Prevention: activity supervised     Problem: Pain (Knee Arthroplasty)  Goal: Acceptable Pain Control  Outcome: Declining     Problem: Postoperative Nausea and Vomiting (Knee Arthroplasty)  Goal: Nausea and Vomiting Relief  Outcome: Declining     Problem: Postoperative Urinary Retention (Knee  Arthroplasty)  Goal: Effective Urinary Elimination  Outcome: Declining     Problem: Adult Inpatient Plan of Care  Goal: Absence of Hospital-Acquired Illness or Injury  Intervention: Identify and Manage Fall Risk  Recent Flowsheet Documentation  Taken 2/10/2022 1318 by Rylee Butcher RN  Safety Promotion/Fall Prevention: activity supervised  Intervention: Prevent Skin Injury  Recent Flowsheet Documentation  Taken 2/10/2022 1318 by Rylee Butcher RN  Body Position: supine, head elevated  Intervention: Prevent and Manage VTE (Venous Thromboembolism) Risk  Recent Flowsheet Documentation  Taken 2/10/2022 1318 by Rylee Butcher RN  VTE Prevention/Management: anticoagulant therapy maintained     Problem: Ongoing Anesthesia Effects (Knee Arthroplasty)  Goal: Anesthesia/Sedation Recovery  Intervention: Optimize Anesthesia Recovery  Recent Flowsheet Documentation  Taken 2/10/2022 1318 by Rylee Butcher RN  Safety Promotion/Fall Prevention: activity supervised

## 2022-02-11 ENCOUNTER — APPOINTMENT (OUTPATIENT)
Dept: PHYSICAL THERAPY | Facility: CLINIC | Age: 57
End: 2022-02-11
Attending: ORTHOPAEDIC SURGERY
Payer: COMMERCIAL

## 2022-02-11 VITALS
RESPIRATION RATE: 17 BRPM | SYSTOLIC BLOOD PRESSURE: 139 MMHG | HEART RATE: 103 BPM | BODY MASS INDEX: 31.13 KG/M2 | TEMPERATURE: 98.5 F | OXYGEN SATURATION: 96 % | HEIGHT: 61 IN | DIASTOLIC BLOOD PRESSURE: 81 MMHG | WEIGHT: 164.9 LBS

## 2022-02-11 LAB
FASTING STATUS PATIENT QL REPORTED: YES
GLUCOSE BLD-MCNC: 114 MG/DL (ref 70–99)
HGB BLD-MCNC: 11.2 G/DL (ref 11.7–15.7)
MAGNESIUM SERPL-MCNC: 1.9 MG/DL (ref 1.8–2.6)

## 2022-02-11 PROCEDURE — 36415 COLL VENOUS BLD VENIPUNCTURE: CPT | Performed by: ORTHOPAEDIC SURGERY

## 2022-02-11 PROCEDURE — 97116 GAIT TRAINING THERAPY: CPT | Mod: GP | Performed by: PHYSICAL THERAPIST

## 2022-02-11 PROCEDURE — 250N000013 HC RX MED GY IP 250 OP 250 PS 637: Performed by: ORTHOPAEDIC SURGERY

## 2022-02-11 PROCEDURE — 97530 THERAPEUTIC ACTIVITIES: CPT | Mod: GP | Performed by: PHYSICAL THERAPIST

## 2022-02-11 PROCEDURE — 82947 ASSAY GLUCOSE BLOOD QUANT: CPT | Performed by: ORTHOPAEDIC SURGERY

## 2022-02-11 PROCEDURE — 250N000013 HC RX MED GY IP 250 OP 250 PS 637: Performed by: NURSE PRACTITIONER

## 2022-02-11 PROCEDURE — 85018 HEMOGLOBIN: CPT | Performed by: NURSE PRACTITIONER

## 2022-02-11 RX ORDER — CYCLOBENZAPRINE HCL 10 MG
10 TABLET ORAL 3 TIMES DAILY PRN
Qty: 40 TABLET | Refills: 1 | Status: SHIPPED | OUTPATIENT
Start: 2022-02-11 | End: 2022-06-21

## 2022-02-11 RX ORDER — GABAPENTIN 100 MG/1
100 CAPSULE ORAL 3 TIMES DAILY
Qty: 30 CAPSULE | Refills: 1 | Status: SHIPPED | OUTPATIENT
Start: 2022-02-11 | End: 2022-06-21

## 2022-02-11 RX ORDER — DIAZEPAM 5 MG
5 TABLET ORAL EVERY 8 HOURS PRN
Qty: 25 TABLET | Refills: 1 | Status: SHIPPED | OUTPATIENT
Start: 2022-02-11 | End: 2022-06-21

## 2022-02-11 RX ORDER — HYDROMORPHONE HYDROCHLORIDE 2 MG/1
2-4 TABLET ORAL EVERY 4 HOURS PRN
Qty: 30 TABLET | Refills: 0 | Status: SHIPPED | OUTPATIENT
Start: 2022-02-11 | End: 2022-02-16

## 2022-02-11 RX ADMIN — HYDROXYZINE HYDROCHLORIDE 25 MG: 25 TABLET ORAL at 00:50

## 2022-02-11 RX ADMIN — THIAMINE HCL TAB 100 MG 100 MG: 100 TAB at 08:51

## 2022-02-11 RX ADMIN — HYDROMORPHONE HYDROCHLORIDE 4 MG: 2 TABLET ORAL at 00:50

## 2022-02-11 RX ADMIN — CYCLOBENZAPRINE HYDROCHLORIDE 10 MG: 10 TABLET, FILM COATED ORAL at 02:03

## 2022-02-11 RX ADMIN — CLONIDINE HYDROCHLORIDE 0.1 MG: 0.1 TABLET ORAL at 04:48

## 2022-02-11 RX ADMIN — ACETAMINOPHEN 975 MG: 325 TABLET, FILM COATED ORAL at 04:52

## 2022-02-11 RX ADMIN — MULTIPLE VITAMINS W/ MINERALS TAB 1 TABLET: TAB at 08:52

## 2022-02-11 RX ADMIN — HYDROMORPHONE HYDROCHLORIDE 4 MG: 2 TABLET ORAL at 04:47

## 2022-02-11 RX ADMIN — SERTRALINE HYDROCHLORIDE 100 MG: 100 TABLET ORAL at 04:48

## 2022-02-11 RX ADMIN — FOLIC ACID 1 MG: 1 TABLET ORAL at 08:52

## 2022-02-11 RX ADMIN — SENNOSIDES AND DOCUSATE SODIUM 1 TABLET: 50; 8.6 TABLET ORAL at 08:51

## 2022-02-11 RX ADMIN — POLYETHYLENE GLYCOL 3350 17 G: 17 POWDER, FOR SOLUTION ORAL at 08:52

## 2022-02-11 RX ADMIN — HYDROMORPHONE HYDROCHLORIDE 4 MG: 2 TABLET ORAL at 08:51

## 2022-02-11 RX ADMIN — DIAZEPAM 10 MG: 5 TABLET ORAL at 10:31

## 2022-02-11 RX ADMIN — LEVOTHYROXINE SODIUM 100 MCG: 100 TABLET ORAL at 04:47

## 2022-02-11 RX ADMIN — RIVAROXABAN 10 MG: 10 TABLET, FILM COATED ORAL at 08:51

## 2022-02-11 RX ADMIN — FAMOTIDINE 20 MG: 20 TABLET ORAL at 08:51

## 2022-02-11 RX ADMIN — GABAPENTIN 100 MG: 100 CAPSULE ORAL at 08:51

## 2022-02-11 ASSESSMENT — MIFFLIN-ST. JEOR: SCORE: 1275.36

## 2022-02-11 NOTE — PLAN OF CARE
Problem: Adult Inpatient Plan of Care  Goal: Plan of Care Review  Outcome: Improving     Problem: Adult Inpatient Plan of Care  Goal: Patient-Specific Goal (Individualized)  Flowsheets  Taken 2/11/2022 0523 by Mikey Mart RN  Patient-Specific Goals (Include Timeframe): Patient will manage pain with PO Dilaudid 2/11  Taken 2/10/2022 0448 by Mikey Mart RN  Individualized Care Needs: Patient likes fan for sleep  Taken 2/9/2022 1419 by Lianne Power RN  Anxieties, Fears or Concerns: denies  Goal: Absence of Hospital-Acquired Illness or Injury  Intervention: Identify and Manage Fall Risk  Recent Flowsheet Documentation  Taken 2/10/2022 2319 by Mikey Mart RN  Safety Promotion/Fall Prevention: activity supervised  Intervention: Prevent Skin Injury  Recent Flowsheet Documentation  Taken 2/11/2022 0300 by Mikey Mart RN  Body Position:   supine   position changed independently  Taken 2/10/2022 2319 by Mikey Mart RN  Body Position:   side-lying   left  Intervention: Prevent and Manage VTE (Venous Thromboembolism) Risk  Recent Flowsheet Documentation  Taken 2/10/2022 2319 by Mikey Mart RN  VTE Prevention/Management: (Lovenox) anticoagulant therapy maintained     Problem: Joint Function Impaired (Knee Arthroplasty)  Goal: Optimal Functional Ability  Intervention: Promote Optimal Functional Status  Recent Flowsheet Documentation  Taken 2/10/2022 2319 by Mikey Mart RN  Assistive Device Utilized: (bedside commode)   gait belt   walker   other (see comments)  Activity Management: activity adjusted per tolerance     Problem: Ongoing Anesthesia Effects (Knee Arthroplasty)  Goal: Anesthesia/Sedation Recovery  Intervention: Optimize Anesthesia Recovery  Recent Flowsheet Documentation  Taken 2/11/2022 0400 by Mikey Mart RN  Administration (IS): proper technique demonstrated  Level Incentive Spirometer (mL): 2500  Number of Repetitions (IS): 6  Patient Tolerance (IS): no adverse signs/symptoms  present  Taken 2/10/2022 2319 by Mikey Mart RN  Safety Promotion/Fall Prevention: activity supervised  Administration (IS): (d/t sedatio) unable to perform     Problem: Pain (Knee Arthroplasty)  Goal: Acceptable Pain Control  Intervention: Prevent or Manage Pain  Recent Flowsheet Documentation  Taken 2/11/2022 0447 by Mikey Mart RN  Pain Management Interventions: medication (see MAR)  Taken 2/11/2022 0050 by Mikey Mart RN  Pain Management Interventions: medication (see MAR)    Patient is alert and oriented, CIWA 0-4 no valium administered. Slightly hypertensive. She appears to have slept well through the night, snoring between cares. She was up with 2 to commode at 2300 but is now assist 1 with walker/belt. Her movements are sometimes erratic like her body gets moving faster than her thoughts, she doesn't seem aware that she is moving too fast for the situation. Pain is typically always high, particularly after getting up. PO Dilaudid, Flexeril, and Atarax have helped.  Left knee is more swollen. No increase in bruising noted. Aquacell is CDI. CMS is intact.   IS to 2500. Patient questioned about MV, Folate, and Valium last night. Discussed concern for alcohol withdaraw, pt appeared irritated to have this conversation. She denies this is an issue.

## 2022-02-11 NOTE — DISCHARGE INSTRUCTIONS
Total Knee Replacement Discharge Instructions                                     [phone]  Bone and Joint Service Line for issues or concerns    You have been sent on multiple medications for pain, including the valium, dilaudid, atarax, flexeril, gabapentin.  Take these only as needed for pain control.   Do NOT mix any with alcohol as that can be life threatening.        General Care:  After surgery you may feel tired/sleepy. This is normal. If you have any question along the way please contact the office. If you feel it is an issue cannot wait for normal office hours, contact the 24 hour bone and joint line at [phone]. You should not drive or operate machines/equipment until released by your physician to do so.     Wound Care:  Keep incision covered with hospital dressing (Aquacel) for one week. It is okay to shower with this dressing on. However, do not submerge your dressing and incision in water for roughly 2 weeks. After one week remove this dressing and then keep it clean, dry, and covered. If this dressing become looses or falls off it is ok to cover with gauze and tape.  Wash the incision gently with warm soapy water after removing your hospital dressing and lightly pat dry.       Diet:  Start with non-alcoholic liquids at first, particularly water or sports drinks after surgery. Progress to bland foods such as crackers and bread and finally to your normal diet if you have no problems. Avoid alcohol when taking narcotic pain medications.      Pain control:  Take your pain medications as prescribed. These medications may make you sleepy. Do not drive, operate equipment, or drink alcohol when taking these.  You may take Tylenol (Generic name is acetaminophen) as directed on the bottle for additional relief or in place of the prescribed pain medications as your pain gets better. Do not take any other NSAIDs (Motrin, Ibuprofen, Aleve, Naproxen) while taking the blood thinner. If the medications cause  a reaction such as nausea or skin rash, stop taking them and contact your doctor. Please plan accordingly, pain medications will not be re-filled on the weekends or at night. Call the office during the day if you need more medications.    Blood thinner:  It is very important to take it as prescribed. It is a medication to help prevent blood clots in your legs or lungs. No medication is perfect, so if you notice a sudden onset of pain/swelling in your calf area call your doctor. If you notice a sudden onset of troubles breathing and/or chest pain call 911.     Swelling (edema) control:  Preventing swelling (edema) in your legs after surgery is very important. It is helpful for achieving optimal range of motion as well as preventing blood clots.  It starts with simple things such as elevation of your legs and icing. Elevate your legs above your heart.    Do this for 20 minutes every couple hours the first few days after surgery. We also recommend DENICE hose (compression hose) to be worn. Wear on both legs during the day. You may remove at night. Wear these until directed to stop.      Icing:  It is common for some swelling, aching and stiffness to occur for up to 6-9 months after a knee replacement. If you knee swells, get off your feet, elevate your leg and apply some ice packs. Apply for 20 minutes at a time. For the first 1-2 weeks apply ice 2-3 x day or more after therapy.    Walker/crutches:  Use a walker/crutches when you go home. You will transition to the use of a cane and finally to no additional support.     Braces:  You will go home with a knee immobilizer. You can take it off when you are lying or sitting down. Wear it when you are walking. This immobilizer can come off after you are doing a straight leg raise. Your physician and or physical therapist will help to determine when to stop wearing it.     Physical Therapy:  The success of your knee replacement is based on doing physical therapy. You will have  some pain and discomfort along the way. If you feel your pain is limiting your progress make sure to take some pain medication prior to your therapy session. If you pain medications are not working talk to your surgeon.   For the first 2 weeks after going home you will have in-home physical therapy. The goal is to work on range of motion. While it is important to working on bending your knee, it is equally important to make sure you knee comes out all the way straight. To assist in this do not place any bumps, pillows and or blankets under your knee when you are lying down. You should have an outpatient physical therapy appointment scheduled for about 2 weeks after surgery.     Activity:  Unless otherwise instructed, you can weight bear as tolerated. While laying or sitting down you should straighten your knee all the way out and then gently work on bending the knee back. Do not worry at first if your knee feels stiff and will not bend like normal, this will get better. Never put a pillow or bump under you knee, instead put a pillow under your ankle so your knee will straighten out all the way.     Normal findings after surgery:  Numbness and tenderness around the incisions and to the outside of the incision is normal.  You may have bruising around the incisions and down the lower leg.   Your knee will be swollen for months after surgery. It will feel  tight  to move.   Low grade fevers less than 100.5 degrees Fahrenheit are normal.   You may have some minor swelling in the leg/calf area.  You will have some increased pain after your therapy sessions.     When to call the Office:  Temperature greater than 101.5 degrees Fahreheit.  Fever, chills, and increasing pain in the knee.  Excessive drainage from the incisions that include bright red blood.  Drainage from the incisions sites that appear yellow, pus-like, or foul smelling.  Increasing pain the knee not relieved by the prescribed pain medications or  ice.  Persistent nausea or vomiting not helped by the Zofran.  Increased pain or swelling in your calf area (in back above your ankle) that wasn t there when in the hospital.  Any other effects you feel are significant.  Call 911 if you experience any chest pain and/or shortness or breath.

## 2022-02-11 NOTE — PROGRESS NOTES
S-(situation): Patient discharged to home via wheelchair with     B-(background): Patient had a TKA left knee. Patient has had severe pain and stayed an extra night to help manage pain.     A-(assessment): Patient pain has been managed with valium, oxycodone and dilaudid. Patient ok to discharge.     R-(recommendations): Discharge instructions reviewed with patient. Listed belongings gathered and returned to patient.       Discharge Nursing Criteria:     Care Plan and Patient education resolved: Yes    New Medications- pt has been educated about purpose and side effects: Yes    Vaccines  Influenza status verified at discharge:  Yes    MISC  Prescriptions if needed, hard copies sent with patient  NA  Home medications returned to patient: NA  Medication Bin checked and emptied on discharge Yes  Patient reports post-discharge pain management plan is effective: Yes    [place] ONLY:   Discharged with DENICE Stockings Yes  DENICE stocking Education Completed Yes     Klarissa CONTRERAS RN, [facility] 02/11/22 11:09 AM

## 2022-02-11 NOTE — PROGRESS NOTES
"Patient complaining of \"excruciating\" pain throughout shift. Pain unrelieved with current medications. MD notified and prescribed other medications for trial. Unsuccessful. Nonpharmalogical approaches also attempted throughout shift, but unsuccessful. Patient unsteady when ambulating requiring 2 assist. Alert and oriented x's 4, VSS excluding pain.   "

## 2022-02-11 NOTE — DISCHARGE SUMMARY
"[facility] Discharge Summary     Elisa Al MRN# [id number]   YOB: 1965 Age: 56 year old     Date of Admission:  2/9/2022  Date of Discharge:  2/11/2022  Admitting Physician:  Sahil Cottrell DO  Discharge Physician:  FLORENCIA Weinberg CNP  Discharging Service:  Orthopedics     Primary Provider: Kylie Coffman          Admission Diagnoses:   Primary osteoarthritis of left knee [M17.12]  S/P TKR (total knee replacement) using cement, left [Z96.652]          Discharge Diagnosis:     Principal Problem:    Primary osteoarthritis of left knee  Active Problems:    S/P TKR (total knee replacement) using cement, left               Discharge Disposition:     Discharged to home           Condition on Discharge:     Discharge condition: Stable   Discharge vitals: Blood pressure 139/81, pulse 103, temperature 98.5  F (36.9  C), temperature source Oral, resp. rate 17, height 1.549 m (5' 1\"), weight 74.8 kg (164 lb 14.4 oz), last menstrual period 01/13/2004, SpO2 96 %, not currently breastfeeding.   Code status on discharge: Full Code           Procedures / Labs / Imaging:   No other procedures performed during this admission          Medications Prior to Admission:     Medications Prior to Admission   Medication Sig Dispense Refill Last Dose     levothyroxine (SYNTHROID/LEVOTHROID) 100 MCG tablet Take 1 tablet (100 mcg) by mouth daily Appointment needed for additional refills. 90 tablet 3 2/9/2022 at 0200     pseudoePHEDrine (SUDAFED) 120 MG 12 hr tablet Take 120 mg by mouth daily as needed for congestion    Past Week at Unknown time     rosuvastatin (CRESTOR) 5 MG tablet Take 1 tablet (5 mg) by mouth daily 30 tablet 3 2/9/2022 at 0200     sertraline (ZOLOFT) 100 MG tablet TAKE ONE TABLET BY MOUTH ONCE DAILY 90 tablet 3 2/9/2022 at 0200     sertraline (ZOLOFT) 50 MG tablet TAKE ONE TABLET BY MOUTH ONCE DAILY 90 tablet 3 2/9/2022 at 0200     [DISCONTINUED] cyclobenzaprine " (FLEXERIL) 10 MG tablet Take 1 tablet (10 mg) by mouth 3 times daily as needed for muscle spasms (pain) 40 tablet 1 Past Month at 2000     [DISCONTINUED] tiZANidine (ZANAFLEX) 4 MG tablet Take 1 tablet (4 mg) by mouth 3 times daily as needed for muscle spasms Can cause sedation. Do not mix with alcohol. 45 tablet 1 2/8/2022 at 2000     [DISCONTINUED] TYLENOL EXTRA STRENGTH 500 MG OR TABS  60 0 2/8/2022 at 2000             Discharge Medications:     Current Discharge Medication List      START taking these medications    Details   diazepam (VALIUM) 5 MG tablet Take 1 tablet (5 mg) by mouth every 8 hours as needed for anxiety  Qty: 25 tablet, Refills: 1    Associated Diagnoses: S/P total knee replacement using cement, left      gabapentin (NEURONTIN) 100 MG capsule Take 1 capsule (100 mg) by mouth 3 times daily  Qty: 30 capsule, Refills: 1    Associated Diagnoses: S/P total knee replacement using cement, left      HYDROmorphone (DILAUDID) 2 MG tablet Take 1-2 tablets (2-4 mg) by mouth every 4 hours as needed for moderate to severe pain  Qty: 30 tablet, Refills: 0    Associated Diagnoses: S/P total knee replacement using cement, left      hydrOXYzine (ATARAX) 25 MG tablet Take 1 tablet (25 mg) by mouth every 6 hours as needed for itching or anxiety (with pain, moderate pain)  Qty: 30 tablet, Refills: 0    Associated Diagnoses: S/P total knee replacement using cement, left      naloxone (NARCAN) 4 MG/0.1ML nasal spray Spray 1 spray (4 mg) into one nostril alternating nostrils once as needed for opioid reversal every 2-3 minutes until assistance arrives  Qty: 0.2 mL, Refills: 0    Associated Diagnoses: S/P total knee replacement using cement, left      oxyCODONE (ROXICODONE) 5 MG tablet Take 1-2 tablets (5-10 mg) by mouth every 4 hours as needed for pain (Moderate to Severe)  Qty: 30 tablet, Refills: 0    Associated Diagnoses: S/P total knee replacement using cement, left      polyethylene glycol (MIRALAX) 17 g packet  Take 17 g by mouth daily  Qty: 7 packet, Refills: 0    Associated Diagnoses: S/P total knee replacement using cement, left      rivaroxaban ANTICOAGULANT (XARELTO) 10 MG TABS tablet Take 1 tablet (10 mg) by mouth daily for 14 days  Qty: 14 tablet, Refills: 0    Comments: For DVT Prevention  Associated Diagnoses: S/P total knee replacement using cement, left      senna-docusate (SENOKOT-S/PERICOLACE) 8.6-50 MG tablet Take 1-2 tablets by mouth 2 times daily Take while on oral narcotics to prevent or treat constipation.  Qty: 30 tablet, Refills: 0    Comments: While taking narcotics  Associated Diagnoses: S/P total knee replacement using cement, left         CONTINUE these medications which have CHANGED    Details   acetaminophen (TYLENOL) 325 MG tablet Take 3 tablets (975 mg) by mouth every 8 hours as needed for pain  Qty: 100 tablet, Refills: 0    Associated Diagnoses: S/P total knee replacement using cement, left      cyclobenzaprine (FLEXERIL) 10 MG tablet Take 1 tablet (10 mg) by mouth 3 times daily as needed for muscle spasms (pain)  Qty: 40 tablet, Refills: 1    Associated Diagnoses: Primary osteoarthritis of left knee         CONTINUE these medications which have NOT CHANGED    Details   levothyroxine (SYNTHROID/LEVOTHROID) 100 MCG tablet Take 1 tablet (100 mcg) by mouth daily Appointment needed for additional refills.  Qty: 90 tablet, Refills: 3    Associated Diagnoses: Hypothyroidism, unspecified type      pseudoePHEDrine (SUDAFED) 120 MG 12 hr tablet Take 120 mg by mouth daily as needed for congestion       rosuvastatin (CRESTOR) 5 MG tablet Take 1 tablet (5 mg) by mouth daily  Qty: 30 tablet, Refills: 3    Associated Diagnoses: Hyperlipidemia LDL goal <130      !! sertraline (ZOLOFT) 100 MG tablet TAKE ONE TABLET BY MOUTH ONCE DAILY  Qty: 90 tablet, Refills: 3    Associated Diagnoses: Depression with anxiety      !! sertraline (ZOLOFT) 50 MG tablet TAKE ONE TABLET BY MOUTH ONCE DAILY  Qty: 90 tablet,  Refills: 3    Associated Diagnoses: Depression with anxiety       !! - Potential duplicate medications found. Please discuss with provider.      STOP taking these medications       tiZANidine (ZANAFLEX) 4 MG tablet Comments:   Reason for Stopping:                     Consultations:     No consultations were requested during this admission             Brief History of Illness:   See operative report          Hospital Course:     Patient admitted after routine elective surgery.  Patient discharge POD2 By day of discharge discharge: Doing well and pain much improved. Patient initially had very difficult to control pain starting PM of POD0 until AM of POD1 when a good combination of medications was found.    Patient this AM reports pain much better, and is feeling very good. physical therapy cleared patient for discharge home. No respiratory issues from the combination of medications. Patient was initially started on flexeril, atarax, oxycodone, tylenol, and this did not control pain. Oxycodone was switched to dilaudid and this helped but not significantly. MS contin was trialed and did not help. Valium was trialed and did help quite a bit. Gabapentin helped.  No reports of sedation on this combination. She reports was able to sleep last night, ambulate.   Has been eating, drinking, voiding, use IS and denies breathing issues.  Has been ambulatory.  She expresses desire to discharge today.  Direct to outpatient physical therapy  Xarelto for DVT prevention     Current Pain medication that is working and not causing respiratory issues.            -gabapentin 100mg TID           -atarax 25mg q6 hours PRN           -flexeril 10mg q8 hours PRN           -dilaudid 2-4mg q4 hours PRN           -valium 5mg Q8 hours PRN            -tylenol 975mg q8 hours PRN     With her history and amount of medication narcan will discharged with patient as well.                   Significant Results:     None             Pending Results:      None           Discharge Instructions and Follow-Up:     Discharge diet: Regular   Discharge activity: Activity as tolerated   Discharge follow-up: 14 days with Dr. Cottrell   Effected Extremity Left leg       Weight Bearing status Weight bearing as tolerated       Lines and drains: None    Wound care: Keep incision covered with hospital dressing (Aquacel) for one week. It is okay to shower with this dressing on. However, do not submerge your dressing and incision in water for roughly 2 weeks. After one week remove this dressing and then keep it clean, dry, and covered. If this dressing become looses or falls off it is ok to cover with gauze and tape.  Wash the incision gently with warm soapy water after removing your hospital dressing and lightly pat dry.             FLORENCIA Crespo, CNP Orthopedic Surgery

## 2022-02-11 NOTE — PROGRESS NOTES
[facility]  Orthopedics Progress Note           Assessment and Plan:    Assessment:   Post-operative day #2 Post-operative day #1 Procedure(s):  left total knee replacement  Initially intractable pain  Chronic alcohol use - (1 bottle of wine/day reported)      Plan:   Encourage IS  Start or continue physical therapy  Activity as tolerated  Weight-bear as tolerated.  Discharge from hospital today.  Pain control measures  Advance diet as tolerated  Routine wound care:just prior to discharge RN to replace bulky dressing with sterile aquacell with sterile procedure, place DENICE stocking after dressing change.  DVT Prophylaxis: Xarelto, SCD's, Compression Hose  No acute medical issues.            Interval History:   Doing well.  Much better today.  Pain initially was difficult yesterday, was switched to dilaudid. did add gabapentin. Then added valium. Initially received report that the valium did not help and cause more side effects but then later heard the valium did improve symptoms quite a bit.  She was continued on valium last night along with CIWA protocol due to her history of alcohol use.  Her CIWA scores this AM have been <4.  Patient this AM reports pain much better, and is feeling very good. physical therapy cleared patient for discharge home. No respiratory issues from the combination of medications. No reports of sedation. She reports was able to sleep last night, ambulate.   Has been eating, drinking, voiding, use IS and denies breathing issues.  Has been ambulatory.  She expresses desire to discharge today.  Direct to outpatient physical therapy  Xarelto for DVT prevention     Current Pain medication that is working and not causing respiratory issues.            -gabapentin 100mg TID           -atarax 25mg q6 hours PRN           -flexeril 10mg q8 hours PRN           -dilaudid 2-4mg q4 hours PRN           -valium 5mg Q8 hours PRN            -tylenol 975mg q8 hours PRN    With her history and  "amount of medication narcan will discharged with patient as well.            Review of Systems:    The patient denies any chest pain, shortness of breath, excessive pain, fever, chills, purulent drainage from the wound, nausea or vomiting.               Physical Exam:   General: awake, alert, appropriate and in no acute distress. Sitting in bed, smiling, talking and reporting feeling well.   Blood pressure 139/81, pulse 103, temperature 98.5  F (36.9  C), temperature source Oral, resp. rate 17, height 1.549 m (5' 1\"), weight 74.8 kg (164 lb 14.4 oz), last menstrual period 01/13/2004, SpO2 96 %, not currently breastfeeding.  Left knee:  Dressing clean, dry and intact. Surrounding skin intact, no breakdown. Calf is soft, nontender with no significant swelling. Distal neurovascular grossly intact 2+ distal pulse, sensation intact to foot, able to df/pf against resistance. No pain with Df/PF, no pain with flexion/extension of the great toe. No focal masses, swelling, tenderness or increased warmth to the calf.  Compartments soft and non-tender.  Brisk cap refill.  No tender to the calf and thigh. Tender only to the knee.  Overall much less tenderness to the leg than yesterday.             Data:     Results for orders placed or performed during the hospital encounter of 02/09/22 (from the past 24 hour(s))   Magnesium   Result Value Ref Range    Magnesium 1.9 1.8 - 2.6 mg/dL   Phosphorus   Result Value Ref Range    Phosphorus 2.7 2.5 - 4.5 mg/dL   Extra Tube    Narrative    The following orders were created for panel order Extra Tube.  Procedure                               Abnormality         Status                     ---------                               -----------         ------                     Extra Purple Top Tube[[id number]]                            Final result                 Please view results for these tests on the individual orders.   Extra Purple Top Tube   Result Value Ref Range    Hold Specimen " [facility]    Glucose   Result Value Ref Range    Glucose 114 (H) 70 - 99 mg/dL    Patient Fasting > 8hrs? Yes    Hemoglobin   Result Value Ref Range    Hemoglobin 11.2 (L) 11.7 - 15.7 g/dL     FLORENCIA Crespo, CNP  Orthopedic Surgery

## 2022-02-11 NOTE — PLAN OF CARE
Physical Therapy Discharge Summary    Reason for therapy discharge:    Discharged to home with outpatient therapy.    Progress towards therapy goal(s). See goals on Care Plan in [facility] electronic health record for goal details.  Goals partially met.  Barriers to achieving goals:   limited tolerance for therapy and patient demonstrates functional strength and good capacity to compete greater activity. mobility completed sufficent for safe home navigation did not need to ambulate further or complete additional stairs.    Therapy recommendation(s):    Continued therapy is recommended.  Rationale/Recommendations:   . Patient would benefit from continued skilled therapeutic intervention in order to progress them towards a higher level of function in accordance with their surgeon's protocol.    Thank you for your referral.  Yvette Cortés, PT, DPT, ATC, [facility]  O: [phone]  E: [email or web address]

## 2022-02-14 DIAGNOSIS — Z96.652 S/P TOTAL KNEE REPLACEMENT USING CEMENT, LEFT: ICD-10-CM

## 2022-02-16 ENCOUNTER — HOSPITAL ENCOUNTER (OUTPATIENT)
Dept: PHYSICAL THERAPY | Facility: CLINIC | Age: 57
Setting detail: THERAPIES SERIES
End: 2022-02-16
Attending: NURSE PRACTITIONER
Payer: COMMERCIAL

## 2022-02-16 DIAGNOSIS — M17.12 PRIMARY OSTEOARTHRITIS OF LEFT KNEE: ICD-10-CM

## 2022-02-16 PROCEDURE — 97161 PT EVAL LOW COMPLEX 20 MIN: CPT | Mod: GP,59

## 2022-02-16 PROCEDURE — 97110 THERAPEUTIC EXERCISES: CPT | Mod: GP

## 2022-02-16 RX ORDER — HYDROMORPHONE HYDROCHLORIDE 2 MG/1
2-4 TABLET ORAL EVERY 4 HOURS PRN
Qty: 30 TABLET | Refills: 0 | Status: SHIPPED | OUTPATIENT
Start: 2022-02-16 | End: 2022-03-01

## 2022-02-16 RX ORDER — OXYCODONE HYDROCHLORIDE 5 MG/1
5-10 TABLET ORAL EVERY 4 HOURS PRN
Qty: 30 TABLET | Refills: 0 | Status: SHIPPED | OUTPATIENT
Start: 2022-02-16 | End: 2022-06-21

## 2022-02-16 NOTE — TELEPHONE ENCOUNTER
Reason for Call:  Other prescription    Detailed comments: pt calling wanting a refill on her pain meds. She has enough to get her through today 02/16/22 but the pharmacy is telling her she can not fill it until next week. Please call pt back once it is filled or with any questions.     Phone Number Patient can be reached at: Home number on file [phone] (home)    Best Time: any    Can we leave a detailed message on this number? YES    Call taken on 2/16/2022 at 10:34 AM by Marbella Soliman

## 2022-02-16 NOTE — TELEPHONE ENCOUNTER
I reviewed this chart for the medication requested but I found that Dr. Lockhart has already okayed these medications earlier today.  I called to let the patient know.

## 2022-02-16 NOTE — TELEPHONE ENCOUNTER
Pharmacy called for refill clarification. Should patient continue to take both narcotics for post-op pain? Should dose start to be decreased and weaned down?    Refills were already signed by Dr. Lockhart; however, please review and notify pharmacy.     Annamaria Gibbons RN on 2/16/2022 at 3:00 PM

## 2022-02-16 NOTE — PROGRESS NOTES
"   02/16/22 0911   General Information   Type of Visit Initial OP Ortho PT Evaluation   Start of Care Date 02/16/22   Referring Physician FLORENCIA Crespo, CNP   Patient/Family Goals Statement \"To have no pain and walk normal\"   Orders Evaluate and Treat   Date of Order 02/07/22   Certification Required? No   Medical Diagnosis Primary osteoarthritis of left knee   Surgical/Medical history reviewed Yes   Precautions/Limitations no known precautions/limitations   Weight-Bearing Status - LUE full weight-bearing   Weight-Bearing Status - RUE full weight-bearing   Weight-Bearing Status - LLE weight-bearing as tolerated   Weight-Bearing Status - RLE full weight-bearing       Present No   Body Part(s)   Body Part(s) Knee   Presentation and Etiology   Pertinent history of current problem (include personal factors and/or comorbidities that impact the POC) 56 y.o. female presents s/p L TKA on 2/9/2022. Pt. presents with longstanding progressive knee pain. Has trialed conservative care including OTC medication, steroid injection, activity modification, and bracing without improvement. Pt. lives with her  in a split [place] where she cares for horses, cats, and dog. Pt. demonstrates decreased ROM, decreased strength, and edema at affected area resulting in gait disturbance, difficulty transfers, and prolonged positioning. Pt. will benefit from skilled PT to address these deficits in order to facilitate return to PLOF. PMH includes spinal stimulator and multiple previous back surgeries.    Impairments A. Pain;B. Decreased WB tolerance;C. Swelling;D. Decreased ROM;E. Decreased flexibility;F. Decreased strength and endurance;G. Impaired balance;H. Impaired gait   Functional Limitations perform activities of daily living;perform desired leisure / sports activities   Symptom Location Pt. indicates entire L LE   How/Where did it occur From Degenerative Joint Disease   Onset date " "of current episode/exacerbation 02/09/22   Chronicity New   Pain rating (0-10 point scale) Best (/10);Worst (/10)   Best (/10) 5/10   Worst (/10) 8-9/10   Pain quality D. Burning;C. Aching   Frequency of pain/symptoms A. Constant   Pain/symptoms are: The same all the time   Pain/symptoms exacerbated by B. Walking;G. Certain positions   Pain/symptoms eased by C. Rest;H. Cold;I. OTC medication(s)   Progression of symptoms since onset: Other  (\"Different type of pain\")   Prior Level of Function   Prior Level of Function-Mobility Ind   Prior Level of Function-ADLs Ind   Current Level of Function   Current Community Support Family/friend caregiver   Patient role/employment history C. Homemaker   Living environment House/[facility]   Home/community accessibility Split entry   Current equipment-Gait/Locomotion Front wheeled walker;Standard cane   Fall Risk Screen   Fall screen completed by PT   Have you fallen 2 or more times in the past year? No   Have you fallen and had an injury in the past year? No   Is patient a fall risk? Yes;Department fall risk interventions implemented   Abuse Screen (yes response referral indicated)   Feels Unsafe at Home or Work/School no   Feels Threatened by Someone no   Does Anyone Try to Keep You From Having Contact with Others or Doing Things Outside Your Home? no   Physical Signs of Abuse Present no   System Outcome Measures   Outcome Measures   (LEFS)   Knee Objective Findings   Side (if bilateral, select both right and left) Left;Right   Observation Pt. presents wearing compression socks and immobilizer with fww. Mild edema grossly throughout L LE.   Gait/Locomotion Antalgic L LE, uses fww to ambulate. Shortened stride, step to gait pattern   Knee Special Test Comments (-) Alec's bilat.   Palpation Min TTP along anterior L knee/incisonal margins   Right Knee Extension AROM WFL   Right Knee Flexion AROM WFL   Left Knee Extension AROM +8 deg   Left Knee Flexion AROM 61 deg   Left Knee " Flexion Strength 3+/5   Left Knee Extension Strength 3+/5   Left Gastrocnemius Flexibility Limited due to pain/guarding and recent surgery   Left Hamstring Flexibility Limited due to pain/guarding and recent surgery   Planned Therapy Interventions   Planned Therapy Interventions ROM;strengthening;stretching;manual therapy;joint mobilization;gait training;balance training   Planned Modality Interventions   Planned Modality Interventions Comments PRN   Clinical Impression   Criteria for Skilled Therapeutic Interventions Met yes, treatment indicated   PT Diagnosis L knee pain, decreased ROM, decreased strength, gait disturbance   Influenced by the following impairments s/p L TKA   Functional limitations due to impairments Walking, standing, sitting, transfers, dressing, bathing, squatting, stairs   Clinical Presentation Stable/Uncomplicated   Clinical Presentation Rationale Clinical judgement   Clinical Decision Making (Complexity) Low complexity   Therapy Frequency other (see comments)  (1-2x/week)   Predicted Duration of Therapy Intervention (days/wks) 12 weeks   Risk & Benefits of therapy have been explained Yes   Patient, Family & other staff in agreement with plan of care Yes   Clinical Impression Comments 56 y.o. female presents s/p 1 week L TKA. Pt. demonstrates decreased ROM, decreased strength, edema, and pain at affected area. These deficits contributing to gait disturbance, decreased tolerance for prolonged positioning, and difficulty with lower body dressing/bathing. Pt. will benefit from skilled PT to address these deficits in order to facilitate improved functional abilities.   Education Assessment   Preferred Learning Style Listening;Reading;Demonstration;Pictures/video   Barriers to Learning No barriers   ORTHO GOALS   PT Ortho Eval Goals 1;2;3   Ortho Goal 1   Goal Identifier ROM   Goal Description Pt. will increase L knee AROM WNL grossly for ease with walking, sitting, stairs, transfers, etc.    Target Date 03/30/22   Ortho Goal 2   Goal Identifier Strength   Goal Description Pt. will increase L LE MMT to 5/5 grossly for ease with transfers, ambulation, stairs, etc.   Target Date 04/13/22   Ortho Goal 3   Goal Identifier Function   Goal Description Pt. will improve LEFS to 60/80 or greater demonstrating improved function.   Target Date 05/04/22   Total Evaluation Time   PT Eval, Low Complexity Minutes (63382) 20   Thank you for your referral.   Farrah Layne, PT, DPT    [facility]   O: [phone]  E: [email or web address]

## 2022-02-16 NOTE — TELEPHONE ENCOUNTER
Patient calling today requesting a refill of pain medication.      Patient underwent left total knee on 2/9 with Dr. Cottrell.    Patient denies any recent injury or signs and symptoms related to infection.     Patient reports pain is currently 6/10 with pain medication.    Patient currently taking:   Oxycodone 1 tablet every forr hours  Dilaudid 1 tablet every 4 hours  Tylenol 3 tablets every 8 hours     Patient utilizing the following at home interventions:   Ice 2-3 times a day  Physical therapy and exercises    History of narcotic fills for this issue:   No refills previously    In additional to all other unrelated medications reflected on their medication list.     Patient has 3 tabs remaining.     Patient uses [place] [facility].     Next follow up appointment: 2/23 with Dr. Cottrell    Educated patient on weaning off narcotics pain medications. Educated patient on resting and elevating.     Annamaria Gibbons RN on 2/16/2022 at 10:53 AM

## 2022-02-18 NOTE — TELEPHONE ENCOUNTER
Dr. Lockhart refilled both medications and thus she will continue both.  I believe we will take one away next time and wean it down.  I tried to call the pharmacy but they were not there yet, I will try and call back later.

## 2022-02-18 NOTE — TELEPHONE ENCOUNTER
I called and talked to the pharmacy. I just let them know that on her next refill most likely we will have her choose 1 or the other or and decrease the amount and increase the duration. They will let their team know.

## 2022-02-23 ENCOUNTER — ANCILLARY PROCEDURE (OUTPATIENT)
Dept: GENERAL RADIOLOGY | Facility: CLINIC | Age: 57
End: 2022-02-23
Attending: NURSE PRACTITIONER
Payer: COMMERCIAL

## 2022-02-23 ENCOUNTER — OFFICE VISIT (OUTPATIENT)
Dept: ORTHOPEDICS | Facility: CLINIC | Age: 57
End: 2022-02-23
Payer: COMMERCIAL

## 2022-02-23 VITALS
SYSTOLIC BLOOD PRESSURE: 136 MMHG | BODY MASS INDEX: 30.99 KG/M2 | WEIGHT: 164.14 LBS | HEIGHT: 61 IN | DIASTOLIC BLOOD PRESSURE: 86 MMHG

## 2022-02-23 DIAGNOSIS — Z96.652 S/P TKR (TOTAL KNEE REPLACEMENT) USING CEMENT, LEFT: ICD-10-CM

## 2022-02-23 DIAGNOSIS — Z96.652 S/P TKR (TOTAL KNEE REPLACEMENT) USING CEMENT, LEFT: Primary | ICD-10-CM

## 2022-02-23 PROCEDURE — 99024 POSTOP FOLLOW-UP VISIT: CPT | Performed by: NURSE PRACTITIONER

## 2022-02-23 PROCEDURE — 73562 X-RAY EXAM OF KNEE 3: CPT | Mod: TC | Performed by: RADIOLOGY

## 2022-02-23 RX ORDER — HYDROMORPHONE HYDROCHLORIDE 2 MG/1
2 TABLET ORAL EVERY 4 HOURS PRN
Qty: 25 TABLET | Refills: 0 | Status: SHIPPED | OUTPATIENT
Start: 2022-02-23 | End: 2022-02-26

## 2022-02-23 NOTE — PROGRESS NOTES
Orthopedic Clinic Post-Operative Note    CHIEF COMPLAINT:   Chief Complaint   Patient presents with     Surgical Followup     DOS: 2/9/2022~Left total knee arthroplasty~14 days postop       HISTORY OF PRESENT ILLNESS  Returns 2 weeks post-op. Reports doing much better. States is already noticing an improvement from prior to surgery. Down to taking 1 dilaudid q4-6 hours. Has stopped the valium, flexeril, as was not helping. Finished the xarelto. Attending therapy,. Making good progress. Happy with current progress. No new concerns. No incision concerns. Did stop TEDs as was not tolerating them.     Patient's past medical, surgical, social and family histories reviewed.     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       Past Surgical History:   Procedure Laterality Date     ARTHROPLASTY KNEE Left 2/9/2022    Procedure: left total knee replacement;  Surgeon: Sahil Cottrell DO;  Location: [facility] OR     CARPAL TUNNEL RELEASE RT/LT  2005    right wrist     COLONOSCOPY N/A 8/16/2021    Procedure: COLONOSCOPY, WITH POLYPECTOMY;  Surgeon: Celso Shipley MD;  Location: [facility] GI     EXPLORE SPINE, REMOVE HARDWARE, COMBINED  4/7/2014    Procedure: COMBINED EXPLORE SPINE, REMOVE HARDWARE;  Hardware Removal Lumbar 4-Sacral 1, Exploration of Fusion;  Surgeon: Brenton Ashford MD;  Location: [facility] OR     FUSION LUMBAR ANTERIOR THREE+ LEVELS       [facility] EXCISION TENDON SHEATH LESION, HAND/FINGR  04/17/2006    Right middle finger flexor sheath MP joint.     HC EXCISION TENDON SHEATH  LESION, HAND/FINGR  08/07/06    Right middle finger     [facility] LAPAROSCOPY, SURGICAL; CHOLECYSTECTOMY  4/12/2004    Cholecystectomy, Laparoscopic     HC REMOVAL OF OVARY/TUBE(S)      left ovary removed secondary to cyst     HC REMOVAL OF TONSILS,12+ Y/O      Tonsils 12+y.o.     [facility] REVISE MEDIAN N/CARPAL TUNNEL SURG  8/22/2005    Left     [facility] LAP,ABD/PERIT/OMENTUM,UNLIST  04/12/10    pelvic adhesion     [facility] TOTAL ABDOM HYSTERECTOMY  09/08/2004    TAHRSO, Cystoscopy.     [facility] COLONOSCOPY W BIOPSY  04/26/10     [facility] COLONOSCOPY W/WO BRUSH/WASH  5/25/2005     [facility] UGI ENDOSCOPY, SIMPLE EXAM  5/25/2005       Medications:  acetaminophen (TYLENOL) 325 MG tablet, Take 3 tablets (975 mg) by mouth every 8 hours as needed for pain  cyclobenzaprine (FLEXERIL) 10 MG tablet, Take 1 tablet (10 mg) by mouth 3 times daily as needed for muscle spasms (pain)  diazepam (VALIUM) 5 MG tablet, Take 1 tablet (5 mg) by mouth every 8 hours as needed for anxiety  gabapentin (NEURONTIN) 100 MG capsule, Take 1 capsule (100 mg) by mouth 3 times daily  HYDROmorphone (DILAUDID) 2 MG tablet, Take 1-2 tablets (2-4 mg) by mouth every 4 hours as needed for moderate to severe pain  hydrOXYzine (ATARAX) 25 MG tablet, Take 1 tablet (25 mg) by mouth every 6 hours as needed for itching or anxiety (with pain, moderate pain)  naloxone (NARCAN) 4 MG/0.1ML nasal spray, Spray 1 spray (4 mg) into one nostril alternating nostrils once as needed for opioid reversal every 2-3 minutes until assistance arrives  oxyCODONE (ROXICODONE) 5 MG tablet, Take 1-2 tablets (5-10 mg) by mouth every 4 hours as needed for pain (Moderate to Severe)  polyethylene glycol (MIRALAX) 17 g packet, Take 17 g by mouth daily  pseudoePHEDrine (SUDAFED) 120 MG 12 hr tablet, Take 120 mg by mouth daily as needed for congestion   rivaroxaban ANTICOAGULANT (XARELTO) 10 MG TABS tablet, Take 1 tablet (10 mg) by mouth daily for 14 days  rosuvastatin (CRESTOR) 5 MG tablet, Take 1 tablet (5 mg) by mouth  "daily  senna-docusate (SENOKOT-S/PERICOLACE) 8.6-50 MG tablet, Take 1-2 tablets by mouth 2 times daily Take while on oral narcotics to prevent or treat constipation.  sertraline (ZOLOFT) 100 MG tablet, TAKE ONE TABLET BY MOUTH ONCE DAILY  sertraline (ZOLOFT) 50 MG tablet, TAKE ONE TABLET BY MOUTH ONCE DAILY  levothyroxine (SYNTHROID/LEVOTHROID) 100 MCG tablet, Take 1 tablet (100 mcg) by mouth daily Appointment needed for additional refills.    No current facility-administered medications on file prior to visit.      Allergies   Allergen Reactions     Aspirin Swelling     Erythromycin Ethylsuccinate GI Disturbance     gi upset     Ibuprofen Swelling     Naproxen      Nsaids Swelling     Zelnorm [Tegaserod Maleate]        Social History     Occupational History     Employer: [employer]   Tobacco Use     Smoking status: Current Every Day Smoker     Packs/day: 0.25     Years: 23.00     Pack years: 5.75     Smokeless tobacco: Former User     Quit date: 1/19/2013   Substance and Sexual Activity     Alcohol use: Yes     Alcohol/week: 0.0 standard drinks     Comment: nightly     Drug use: No     Sexual activity: Yes     Partners: Male       Family History   Problem Relation Age of Onset     Blood Disease Mother         leukemia-passed 2008     Cardiovascular Father         triple bypass     Respiratory Father         Black Lung     Alzheimer Disease Father      Cancer Maternal Grandmother         ovarian/brain ca       Physical Exam:  Vitals: /86   Ht 1.549 m (5' 1\")   Wt 74.5 kg (164 lb 2.2 oz)   LMP 01/13/2004   BMI 31.01 kg/m    BMI= Body mass index is 31.01 kg/m .  Constitutional: healthy, alert and no acute distress   Psychiatric: mentation appears normal and affect normal/bright  NEURO: no focal deficits  SKIN: .healing well, well approximated skin edges, without signs of infection including no erythema, incision breakdown or purlent drainage  JOINT/EXTREMITIES:expected postop swelling to the knee. " AROM 3/100. PROM 1-110.. Extensor intact. No instability.  No swelling distal of the knee. Some bruising tracking distal and superior of the knee. Calf is soft non-tender. No masses, erythema. Distal neurovascular grossly intact.   GAIT: with assistive device    Diagnostic Modalities:  left knee X-ray: The prosthesis has acceptable alignment. No fractures or dislocations. Prosthesis is well seated with no evidence of loosening  Independent visualization of the images was performed.      Impression:   Chief Complaint   Patient presents with     Surgical Followup     DOS: 2/9/2022~Left total knee arthroplasty~14 days postop   doing very well    Plan:   Activity: continue to increase as tolerated. Continue with physical therapy. Currently in outpatient physical therapy.   Has no lower extremity swelling except to knee, did not tolerate TEDs ok to stop this.   Staples/Sutures out: No monocryl and dermal glue used. Ok to leave open to air. Do not soak or submerge for another 4 weeks.   Pain control: did provide refill. Currently taking 1 dilaudid q4 hours PRN.  At 2 weeks post-op this is reasonable. Continue to wean.    Also discussed icing, rest, elevation       Return to clinic 4 weeks, or sooner as needed for changes.    Re-x-ray on return: No    FLORENCIA Crespo, CNP  Orthopedic Surgery

## 2022-02-23 NOTE — LETTER
2/23/2022         RE: Elisa Al  [address] [facility] [address]        Dear Colleague,    Thank you for referring your patient, Elisa Al, to the [facility]. Please see a copy of my visit note below.    Orthopedic Clinic Post-Operative Note    CHIEF COMPLAINT:   Chief Complaint   Patient presents with     Surgical Followup     DOS: 2/9/2022~Left total knee arthroplasty~14 days postop       HISTORY OF PRESENT ILLNESS  Returns 2 weeks post-op. Reports doing much better. States is already noticing an improvement from prior to surgery. Down to taking 1 dilaudid q4-6 hours. Has stopped the valium, flexeril, as was not helping. Finished the xarelto. Attending therapy,. Making good progress. Happy with current progress. No new concerns. No incision concerns. Did stop TEDs as was not tolerating them.     Patient's past medical, surgical, social and family histories reviewed.     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       Past Surgical History:   Procedure Laterality Date     ARTHROPLASTY KNEE Left 2/9/2022    Procedure: left total knee replacement;  Surgeon: Sahil Cottrell DO;  Location:  OR     CARPAL TUNNEL RELEASE RT/LT  2005    right wrist     COLONOSCOPY N/A 8/16/2021    Procedure: COLONOSCOPY, WITH POLYPECTOMY;  Surgeon: Celso Shipley MD;  Location:  GI     EXPLORE SPINE, REMOVE HARDWARE, COMBINED  4/7/2014    Procedure: COMBINED EXPLORE SPINE, REMOVE HARDWARE;  Hardware Removal Lumbar 4-Sacral 1,  Procedure Date: 01/06/2021      Please note this is the second episode of surgery on this date.      PREOPERATIVE DIAGNOSIS:  Cerebellar hematoma.      POSTOPERATIVE DIAGNOSIS:  Cerebellar hematoma.      PROCEDURES:   1.  Right frontal external ventricular drain placement.   2.  Redo midline posterior fossa craniotomy and evacuation of hematoma.      SURGEON:  Jose Rojas MD      ASSISTANT:  Lisa Veloz PA-C      ANESTHESIA:  General endotracheal anesthesia.      ESTIMATED BLOOD LOSS:  800 mL      INDICATIONS:  The patient is an 86-year-old female who underwent resection of the posterior fossa metastasis today.  She remained dependent and postoperative CT demonstrated a hematoma in the operative cavity.  She was brought back to the operating room for EVD placement and evacuation of the hematoma. Please note that Lisa Veloz PA-C's assistance was needed for positioning, retraction, suctioning, and closure.      DESCRIPTION OF PROCEDURE:  The patient was brought to the operating room, general endotracheal anesthesia was induced in the supine position.  The head was prepped and draped in sterile fashion and right frontal external ventricular drain was placed through a gila hole that had been created at Kocher's point.  Once this was done, this was tunneled through a separate exit site and secured to the scalp with a 3-0 nylon clear drainage was noted.  This was hooked up to a [place] drainage system.  Once the incision was closed with staples, the patient was then placed in the [place] headholder rolled in the prone position with the chin tucked.  The back of the head and neck were prepped and draped in sterile fashion.  Her previous incision was opened and the craniotomy site exposed. The microscope was sterilely draped and brought into the field. The bone flap was removed and the Duragen removed and a large hematoma cavity was evacuated.  Significant time was spent on hemostasis.  Again, a tentorial  Exploration of Fusion;  Surgeon: Brenton Ashford MD;  Location: [facility] OR     FUSION LUMBAR ANTERIOR THREE+ LEVELS       [facility] EXCISION TENDON SHEATH LESION, HAND/FINGR  04/17/2006    Right middle finger flexor sheath MP joint.     HC EXCISION TENDON SHEATH LESION, HAND/FINGR  08/07/06    Right middle finger     [facility] LAPAROSCOPY, SURGICAL; CHOLECYSTECTOMY  4/12/2004    Cholecystectomy, Laparoscopic     HC REMOVAL OF OVARY/TUBE(S)      left ovary removed secondary to cyst     HC REMOVAL OF TONSILS,12+ Y/O      Tonsils 12+y.o.     HC REVISE MEDIAN N/CARPAL TUNNEL SURG  8/22/2005    Left     [facility] LAP,ABD/PERIT/OMENTUM,UNLIST  04/12/10    pelvic adhesion     [facility] TOTAL ABDOM HYSTERECTOMY  09/08/2004    TAHRSO, Cystoscopy.     [facility] COLONOSCOPY W BIOPSY  04/26/10     [facility] COLONOSCOPY W/WO BRUSH/WASH  5/25/2005     [facility] UGI ENDOSCOPY, SIMPLE EXAM  5/25/2005       Medications:  acetaminophen (TYLENOL) 325 MG tablet, Take 3 tablets (975 mg) by mouth every 8 hours as needed for pain  cyclobenzaprine (FLEXERIL) 10 MG tablet, Take 1 tablet (10 mg) by mouth 3 times daily as needed for muscle spasms (pain)  diazepam (VALIUM) 5 MG tablet, Take 1 tablet (5 mg) by mouth every 8 hours as needed for anxiety  gabapentin (NEURONTIN) 100 MG capsule, Take 1 capsule (100 mg) by mouth 3 times daily  HYDROmorphone (DILAUDID) 2 MG tablet, Take 1-2 tablets (2-4 mg) by mouth every 4 hours as needed for moderate to severe pain  hydrOXYzine (ATARAX) 25 MG tablet, Take 1 tablet (25 mg) by mouth every 6 hours as needed for itching or anxiety (with pain, moderate pain)  naloxone (NARCAN) 4 MG/0.1ML nasal spray, Spray 1 spray (4 mg) into one nostril alternating nostrils once as needed for opioid reversal every 2-3 minutes until assistance arrives  oxyCODONE (ROXICODONE) 5 MG tablet, Take 1-2 tablets (5-10 mg) by mouth every 4 hours as needed for pain (Moderate to Severe)  polyethylene glycol (MIRALAX) 17 g packet, Take 17 g by mouth daily  pseudoePHEDrine  bleeder was noted.  This was covered with Gelfoam, which was then secured with figure-of-eight stitch around the bleeder.  Once hemostasis was achieved, again, a Duragen was placed over the dural defect.  The bone flap was plated back into place with the low profile cranial plating system.  The fascia was closed with 0 interrupted Vicryl, 2-0 inverted interrupted Vicryl was used for the galea layer and a running 3-0 nylon was used for skin.  The patient was then rolled back in supine position and taken to recovery in good condition.         SELVIN MENEZES MD             D: 2021   T: 2021   MT: BRANDI      Name:     MIHAI GRAHAM   MRN:      [id number]        Account:        [id number]   :      1934           Procedure Date: 2021      Document: [id number]       "(SUDAFED) 120 MG 12 hr tablet, Take 120 mg by mouth daily as needed for congestion   rivaroxaban ANTICOAGULANT (XARELTO) 10 MG TABS tablet, Take 1 tablet (10 mg) by mouth daily for 14 days  rosuvastatin (CRESTOR) 5 MG tablet, Take 1 tablet (5 mg) by mouth daily  senna-docusate (SENOKOT-S/PERICOLACE) 8.6-50 MG tablet, Take 1-2 tablets by mouth 2 times daily Take while on oral narcotics to prevent or treat constipation.  sertraline (ZOLOFT) 100 MG tablet, TAKE ONE TABLET BY MOUTH ONCE DAILY  sertraline (ZOLOFT) 50 MG tablet, TAKE ONE TABLET BY MOUTH ONCE DAILY  levothyroxine (SYNTHROID/LEVOTHROID) 100 MCG tablet, Take 1 tablet (100 mcg) by mouth daily Appointment needed for additional refills.    No current facility-administered medications on file prior to visit.      Allergies   Allergen Reactions     Aspirin Swelling     Erythromycin Ethylsuccinate GI Disturbance     gi upset     Ibuprofen Swelling     Naproxen      Nsaids Swelling     Zelnorm [Tegaserod Maleate]        Social History     Occupational History     Employer: [employer]   Tobacco Use     Smoking status: Current Every Day Smoker     Packs/day: 0.25     Years: 23.00     Pack years: 5.75     Smokeless tobacco: Former User     Quit date: 1/19/2013   Substance and Sexual Activity     Alcohol use: Yes     Alcohol/week: 0.0 standard drinks     Comment: nightly     Drug use: No     Sexual activity: Yes     Partners: Male       Family History   Problem Relation Age of Onset     Blood Disease Mother         leukemia-passed 2008     Cardiovascular Father         triple bypass     Respiratory Father         Black Lung     Alzheimer Disease Father      Cancer Maternal Grandmother         ovarian/brain ca       Physical Exam:  Vitals: /86   Ht 1.549 m (5' 1\")   Wt 74.5 kg (164 lb 2.2 oz)   LMP 01/13/2004   BMI 31.01 kg/m    BMI= Body mass index is 31.01 kg/m .  Constitutional: healthy, alert and no acute distress   Psychiatric: mentation appears " normal and affect normal/bright  NEURO: no focal deficits  SKIN: .healing well, well approximated skin edges, without signs of infection including no erythema, incision breakdown or purlent drainage  JOINT/EXTREMITIES:expected postop swelling to the knee. AROM 3/100. PROM 1-110.. Extensor intact. No instability.  No swelling distal of the knee. Some bruising tracking distal and superior of the knee. Calf is soft non-tender. No masses, erythema. Distal neurovascular grossly intact.   GAIT: with assistive device    Diagnostic Modalities:  left knee X-ray: The prosthesis has acceptable alignment. No fractures or dislocations. Prosthesis is well seated with no evidence of loosening  Independent visualization of the images was performed.      Impression:   Chief Complaint   Patient presents with     Surgical Followup     DOS: 2/9/2022~Left total knee arthroplasty~14 days postop   doing very well    Plan:   Activity: continue to increase as tolerated. Continue with physical therapy. Currently in outpatient physical therapy.   Has no lower extremity swelling except to knee, did not tolerate TEDs ok to stop this.   Staples/Sutures out: No monocryl and dermal glue used. Ok to leave open to air. Do not soak or submerge for another 4 weeks.   Pain control: did provide refill. Currently taking 1 dilaudid q4 hours PRN.  At 2 weeks post-op this is reasonable. Continue to wean.    Also discussed icing, rest, elevation       Return to clinic 4 weeks, or sooner as needed for changes.    Re-x-ray on return: No    FLORENCIA Crespo, CNP  Orthopedic Surgery          Again, thank you for allowing me to participate in the care of your patient.        Sincerely,        FLORENCIA Weinberg CNP

## 2022-02-25 ENCOUNTER — HOSPITAL ENCOUNTER (OUTPATIENT)
Dept: PHYSICAL THERAPY | Facility: CLINIC | Age: 57
Setting detail: THERAPIES SERIES
End: 2022-02-25
Attending: NURSE PRACTITIONER
Payer: COMMERCIAL

## 2022-02-25 PROCEDURE — 97110 THERAPEUTIC EXERCISES: CPT | Mod: GP

## 2022-02-25 PROCEDURE — 97116 GAIT TRAINING THERAPY: CPT | Mod: GP

## 2022-02-28 DIAGNOSIS — E78.5 HYPERLIPIDEMIA LDL GOAL <130: ICD-10-CM

## 2022-02-28 RX ORDER — ROSUVASTATIN CALCIUM 5 MG/1
5 TABLET, COATED ORAL DAILY
Qty: 30 TABLET | Refills: 1 | Status: SHIPPED | OUTPATIENT
Start: 2022-02-28 | End: 2022-04-29

## 2022-03-02 ENCOUNTER — HOSPITAL ENCOUNTER (OUTPATIENT)
Dept: PHYSICAL THERAPY | Facility: CLINIC | Age: 57
Setting detail: THERAPIES SERIES
End: 2022-03-02
Attending: NURSE PRACTITIONER
Payer: COMMERCIAL

## 2022-03-02 PROCEDURE — 97110 THERAPEUTIC EXERCISES: CPT | Mod: GP

## 2022-03-04 ENCOUNTER — HOSPITAL ENCOUNTER (OUTPATIENT)
Dept: PHYSICAL THERAPY | Facility: CLINIC | Age: 57
Setting detail: THERAPIES SERIES
End: 2022-03-04
Attending: NURSE PRACTITIONER
Payer: COMMERCIAL

## 2022-03-04 PROCEDURE — 97110 THERAPEUTIC EXERCISES: CPT | Mod: GP

## 2022-03-07 DIAGNOSIS — Z96.652 S/P TOTAL KNEE REPLACEMENT USING CEMENT, LEFT: ICD-10-CM

## 2022-03-08 RX ORDER — HYDROMORPHONE HYDROCHLORIDE 2 MG/1
2 TABLET ORAL EVERY 6 HOURS PRN
Qty: 30 TABLET | Refills: 0 | OUTPATIENT
Start: 2022-03-08

## 2022-03-08 RX ORDER — HYDROMORPHONE HYDROCHLORIDE 2 MG/1
2 TABLET ORAL 2 TIMES DAILY PRN
Qty: 28 TABLET | Refills: 0 | Status: SHIPPED | OUTPATIENT
Start: 2022-03-08 | End: 2022-03-22

## 2022-03-08 NOTE — TELEPHONE ENCOUNTER
4 weeks from surgery.    Needs to continue to wean.   One last prescription. 1 tablet up to twice a day, will provide 28, this should last 2 weeks will then be 6 weeks from surgery and can no longer provide narcotics.       FLORENCIA Crespo, CNP  Orthopedic Surgery

## 2022-03-09 ENCOUNTER — HOSPITAL ENCOUNTER (OUTPATIENT)
Dept: PHYSICAL THERAPY | Facility: CLINIC | Age: 57
Setting detail: THERAPIES SERIES
End: 2022-03-09
Attending: NURSE PRACTITIONER
Payer: COMMERCIAL

## 2022-03-09 PROCEDURE — 97110 THERAPEUTIC EXERCISES: CPT | Mod: GP

## 2022-03-16 ENCOUNTER — TELEPHONE (OUTPATIENT)
Dept: ORTHOPEDICS | Facility: CLINIC | Age: 57
End: 2022-03-16
Payer: COMMERCIAL

## 2022-03-16 NOTE — TELEPHONE ENCOUNTER
RN Patient Contact    Attempt # 1    Was call answered?  No.  Left message on voicemail with information to call me back.    Per Elia, she used the medication more than what was prescribed, this was supposed to last 2 week and then be done with narcotics.      Is she using her flexeril or other at home medications?     Is she having any red-flag symptoms or new injuries?     Rosetta COLBERT, Lead RN, BSN. . .  3/16/2022, 4:24 PM

## 2022-03-16 NOTE — TELEPHONE ENCOUNTER
Was given a refill of the dilaudid on 3/8 and this was Elia's note:     Chas Gaviria APRN CNP     CE    9:36 AM  Note  4 weeks from surgery.    Needs to continue to wean.   One last prescription. 1 tablet up to twice a day, will provide 28, this should last 2 weeks will then be 6 weeks from surgery and can no longer provide narcotics.         FLORENCIA Crespo, CNP  Orthopedic Surgery               She is now out 8 days later....     Will let elia address this.     Rosetta COLBERT, Lead RN, BSN. . .  3/16/2022, 3:30 PM

## 2022-03-16 NOTE — TELEPHONE ENCOUNTER
Reason for Call:  Other call back    Detailed comments: pt calling to let  know last Wednesday after PT she was in horrible pain so she cancelled the rest of her PT appointments because she is scared to be in pain. She is out of pain meds and would like some advise on what to do. Please call pt back thank you    Phone Number Patient can be reached at: Home number on file [phone] (home)    Best Time: any    Can we leave a detailed message on this number? YES    Call taken on 3/16/2022 at 12:21 PM by Marbella Soliman

## 2022-03-17 NOTE — TELEPHONE ENCOUNTER
Spoke to patient on the phone to relay the message provided by Elia Gaviria and Rosetta Qiu, RN.     Patient had wanted to switch prescription to Tramadol. Explained to the patient our policy for narcotic refills. Patient verbalized understanding.     Patient is utilizing Flexril, tylenol, and ibuprofen at home. Patient denies a new injury. Patient denies red-flag symptoms. States swelling is still significant.    Educated patient on proper compression, elevation, and massage to decrease swelling and pain patient is having. Patient verbalized understanding. Patient has no further questions or concerns at this time.     Annamaria Gibbons RN on 3/17/2022 at 8:42 AM

## 2022-03-18 NOTE — DISCHARGE SUMMARY
[facility]    Outpatient Physical Therapy Discharge Note  Patient: Elisa Al  : 1965    Beginning/End Dates of Reporting Period:  2022 to 3/9/2022    Referring Provider: FLORENCIA Crespo, CNP    Therapy Diagnosis: L knee pain, decreased ROM, decreased strength, gait disturbance     Client Self Report: Pt. states she feels like her knee is improving, she notes some times of less/no pain. Still having difficulty getting in her car and stiff in the morning.     Objective Measurements:  Objective Measure: L knee AROM  Details: Flex: 84 deg, Ext: +7 deg  Objective Measure: Gait  Details: Antalgic L LE, ambulates with SEC. L intoeing decreased today as pt. is focusing on correcting that with ambulation       Goals:  Goal Identifier ROM   Goal Description Pt. will increase L knee AROM WNL grossly for ease with walking, sitting, stairs, transfers, etc.   Target Date 22   Date Met      Progress (detail required for progress note):       Goal Identifier Strength   Goal Description Pt. will increase L LE MMT to 5/5 grossly for ease with transfers, ambulation, stairs, etc.   Target Date 22   Date Met      Progress (detail required for progress note):       Goal Identifier Function   Goal Description Pt. will improve LEFS to 60/80 or greater demonstrating improved function.   Target Date 22   Date Met      Progress (detail required for progress note):       Pt. Called and left voicemail on 3/14/2022 stating she cancelled all PT due to her knee pain. Attempted to call patient back and left voicemail on 3/15 and 3/16 with no return call. At this time, will discharge patient from PT.     Plan:  Discharge from therapy.    Discharge:    Reason for Discharge: Patient chooses to discontinue therapy.  Patient has failed to schedule further appointments.    Equipment Issued:  none    Discharge Plan: Patient to continue home program.    Thank you for your referral.   Farrah Layne PT, DPT    [facility]   O: [phone]  E: [email or web address]

## 2022-03-24 ENCOUNTER — TELEPHONE (OUTPATIENT)
Dept: ORTHOPEDICS | Facility: OTHER | Age: 57
End: 2022-03-24
Payer: COMMERCIAL

## 2022-03-24 NOTE — TELEPHONE ENCOUNTER
Spoke with patient to get more information as she called about pain meds last week, see telephone encounter 3/16/22. Patient states she is still having significant pain that radiates down to her foot. States the swelling is down, but cannot tolerate compression anymore. Has been icing and elevating frequently. Taking acetaminophen 1000mg every 4 hrs. States that doesn't work well. Unable to take ibuprofen-see allergy list, did try it for a bit but was unable to tolerate. Patient rates her pain as a 5/10. Please advise what other things she can try to help with the pain as she states she needs to be able to get outside and take care of her animals. Wendi Connors, CMA

## 2022-03-24 NOTE — TELEPHONE ENCOUNTER
Reason for Call:  Other appointment    Detailed comments: Patient had knee surgery in February. She still has knee pain and is needing to get out and do some work in her barn and she's  wondering if she would be able to get pain medication? Uses [facility] in [place]    Phone Number Patient can be reached at: Home number on file [phone] (home)    Best Time: any    Can we leave a detailed message on this number? YES    Call taken on 3/24/2022 at 7:48 AM by Lori Draper

## 2022-03-25 NOTE — TELEPHONE ENCOUNTER
Prescription pain medication is not appropriate for this patient due to be more than 6 weeks post op. Patient has a follow-up with Dr. Cottrell on Monday and can discussed homeopathic interventions.     Annamaria Gibbons RN on 3/25/2022 at 12:33 PM

## 2022-04-27 DIAGNOSIS — F41.8 DEPRESSION WITH ANXIETY: ICD-10-CM

## 2022-04-28 RX ORDER — SERTRALINE HYDROCHLORIDE 100 MG/1
TABLET, FILM COATED ORAL
Qty: 90 TABLET | Refills: 0 | Status: SHIPPED | OUTPATIENT
Start: 2022-04-28 | End: 2022-06-21

## 2022-04-28 NOTE — TELEPHONE ENCOUNTER
Pending Prescriptions:                       Disp   Refills    sertraline (ZOLOFT) 100 MG tablet [Pharma*90 tab*0            Sig: TAKE ONE TABLET BY MOUTH ONCE DAILY    sertraline (ZOLOFT) 50 MG tablet [Pharmac*90 tab*0            Sig: TAKE ONE TABLET BY MOUTH ONCE DAILY    Medication is being filled for 1 time humberto refill only due to:  Patient is due for annual, PHQ9, mood check    Please call and help schedule.  Thank you!        ''

## 2022-04-29 DIAGNOSIS — E78.5 HYPERLIPIDEMIA LDL GOAL <130: ICD-10-CM

## 2022-04-29 DIAGNOSIS — E03.9 HYPOTHYROIDISM, UNSPECIFIED TYPE: ICD-10-CM

## 2022-04-29 RX ORDER — ROSUVASTATIN CALCIUM 5 MG/1
5 TABLET, COATED ORAL DAILY
Qty: 30 TABLET | Refills: 1 | Status: SHIPPED | OUTPATIENT
Start: 2022-04-29 | End: 2022-06-07

## 2022-06-04 DIAGNOSIS — E78.5 HYPERLIPIDEMIA LDL GOAL <130: ICD-10-CM

## 2022-06-07 RX ORDER — ROSUVASTATIN CALCIUM 5 MG/1
TABLET, COATED ORAL
Qty: 30 TABLET | Refills: 1 | Status: SHIPPED | OUTPATIENT
Start: 2022-06-07 | End: 2022-06-21

## 2022-06-21 ENCOUNTER — OFFICE VISIT (OUTPATIENT)
Dept: FAMILY MEDICINE | Facility: CLINIC | Age: 57
End: 2022-06-21
Payer: COMMERCIAL

## 2022-06-21 VITALS
RESPIRATION RATE: 20 BRPM | TEMPERATURE: 97.5 F | DIASTOLIC BLOOD PRESSURE: 82 MMHG | HEART RATE: 80 BPM | OXYGEN SATURATION: 99 % | WEIGHT: 158.2 LBS | HEIGHT: 60 IN | SYSTOLIC BLOOD PRESSURE: 140 MMHG | BODY MASS INDEX: 31.06 KG/M2

## 2022-06-21 DIAGNOSIS — F32.5 MAJOR DEPRESSION IN REMISSION (H): ICD-10-CM

## 2022-06-21 DIAGNOSIS — F41.8 DEPRESSION WITH ANXIETY: ICD-10-CM

## 2022-06-21 DIAGNOSIS — E03.9 HYPOTHYROIDISM, UNSPECIFIED TYPE: ICD-10-CM

## 2022-06-21 DIAGNOSIS — E78.5 HYPERLIPIDEMIA LDL GOAL <130: ICD-10-CM

## 2022-06-21 DIAGNOSIS — E55.9 VITAMIN D DEFICIENCY: ICD-10-CM

## 2022-06-21 DIAGNOSIS — Z00.00 ROUTINE GENERAL MEDICAL EXAMINATION AT A HEALTH CARE FACILITY: Primary | ICD-10-CM

## 2022-06-21 LAB
ANION GAP SERPL CALCULATED.3IONS-SCNC: 6 MMOL/L (ref 3–14)
BUN SERPL-MCNC: 16 MG/DL (ref 7–30)
CALCIUM SERPL-MCNC: 8.5 MG/DL (ref 8.5–10.1)
CHLORIDE BLD-SCNC: 105 MMOL/L (ref 94–109)
CHOLEST SERPL-MCNC: 205 MG/DL
CO2 SERPL-SCNC: 27 MMOL/L (ref 20–32)
CREAT SERPL-MCNC: 0.51 MG/DL (ref 0.52–1.04)
DEPRECATED CALCIDIOL+CALCIFEROL SERPL-MC: 38 UG/L (ref 20–75)
FASTING STATUS PATIENT QL REPORTED: NO
GFR SERPL CREATININE-BSD FRML MDRD: >90 ML/MIN/1.73M2
GLUCOSE BLD-MCNC: 102 MG/DL (ref 70–99)
HDLC SERPL-MCNC: 79 MG/DL
LDLC SERPL CALC-MCNC: 53 MG/DL
NONHDLC SERPL-MCNC: 126 MG/DL
POTASSIUM BLD-SCNC: 4.2 MMOL/L (ref 3.4–5.3)
SODIUM SERPL-SCNC: 138 MMOL/L (ref 133–144)
TRIGL SERPL-MCNC: 363 MG/DL
TSH SERPL DL<=0.005 MIU/L-ACNC: 1.9 MU/L (ref 0.4–4)

## 2022-06-21 PROCEDURE — 36415 COLL VENOUS BLD VENIPUNCTURE: CPT | Performed by: NURSE PRACTITIONER

## 2022-06-21 PROCEDURE — 99396 PREV VISIT EST AGE 40-64: CPT | Performed by: NURSE PRACTITIONER

## 2022-06-21 PROCEDURE — 84443 ASSAY THYROID STIM HORMONE: CPT | Performed by: NURSE PRACTITIONER

## 2022-06-21 PROCEDURE — 80048 BASIC METABOLIC PNL TOTAL CA: CPT | Performed by: NURSE PRACTITIONER

## 2022-06-21 PROCEDURE — 80061 LIPID PANEL: CPT | Performed by: NURSE PRACTITIONER

## 2022-06-21 PROCEDURE — 82306 VITAMIN D 25 HYDROXY: CPT | Performed by: NURSE PRACTITIONER

## 2022-06-21 PROCEDURE — 99214 OFFICE O/P EST MOD 30 MIN: CPT | Mod: 25 | Performed by: NURSE PRACTITIONER

## 2022-06-21 PROCEDURE — 96127 BRIEF EMOTIONAL/BEHAV ASSMT: CPT | Performed by: NURSE PRACTITIONER

## 2022-06-21 RX ORDER — LEVOTHYROXINE SODIUM 100 UG/1
100 TABLET ORAL DAILY
Qty: 90 TABLET | Refills: 3 | Status: SHIPPED | OUTPATIENT
Start: 2022-06-21 | End: 2023-06-20

## 2022-06-21 RX ORDER — ROSUVASTATIN CALCIUM 5 MG/1
5 TABLET, COATED ORAL DAILY
Qty: 90 TABLET | Refills: 3 | Status: SHIPPED | OUTPATIENT
Start: 2022-06-21 | End: 2022-06-23

## 2022-06-21 RX ORDER — SERTRALINE HYDROCHLORIDE 100 MG/1
150 TABLET, FILM COATED ORAL DAILY
Qty: 135 TABLET | Refills: 3 | Status: SHIPPED | OUTPATIENT
Start: 2022-06-21 | End: 2023-07-17

## 2022-06-21 ASSESSMENT — ENCOUNTER SYMPTOMS
HEMATURIA: 0
HEMATOCHEZIA: 0
CHILLS: 0
WEAKNESS: 0
PALPITATIONS: 0
HEADACHES: 1
COUGH: 0
EYE PAIN: 0
SORE THROAT: 0
FEVER: 0
SHORTNESS OF BREATH: 0
DYSURIA: 0
NERVOUS/ANXIOUS: 1
MYALGIAS: 0
ABDOMINAL PAIN: 1
CONSTIPATION: 0
ARTHRALGIAS: 1
JOINT SWELLING: 1
FREQUENCY: 0
DIARRHEA: 0
DIZZINESS: 0
NAUSEA: 0
PARESTHESIAS: 0
HEARTBURN: 0

## 2022-06-21 ASSESSMENT — PATIENT HEALTH QUESTIONNAIRE - PHQ9
SUM OF ALL RESPONSES TO PHQ QUESTIONS 1-9: 1
10. IF YOU CHECKED OFF ANY PROBLEMS, HOW DIFFICULT HAVE THESE PROBLEMS MADE IT FOR YOU TO DO YOUR WORK, TAKE CARE OF THINGS AT HOME, OR GET ALONG WITH OTHER PEOPLE: NOT DIFFICULT AT ALL
SUM OF ALL RESPONSES TO PHQ QUESTIONS 1-9: 1

## 2022-06-21 ASSESSMENT — PAIN SCALES - GENERAL: PAINLEVEL: MODERATE PAIN (5)

## 2022-06-21 NOTE — LETTER
June 23, 2022      Elisa Al  [address] [facility] [address]        Dear ,    We are writing to inform you of your test results.    Kenton Pérez- your cholesterol is elevated- lets increase your pill to 10mg- you can take two 5mg pills until you run out.  Your blood sugar is just slightly elevated.  Otherwise all other labs are normal.      Resulted Orders   Vitamin D Deficiency   Result Value Ref Range    Vitamin D, Total (25-Hydroxy) 38 20 - 75 ug/L    Narrative    Season, race, dietary intake, and treatment affect the concentration of 25-hydroxy-Vitamin D. Values may decrease during winter months and increase during summer months. Values 20-29 ug/L may indicate Vitamin D insufficiency and values <20 ug/L may indicate Vitamin D deficiency.    Vitamin D determination is routinely performed by an immunoassay specific for 25 hydroxyvitamin D3.  If an individual is on vitamin D2(ergocalciferol) supplementation, please specify 25 OH vitamin D2 and D3 level determination by LCMSMS test VITD23.     Basic metabolic panel  (Ca, Cl, CO2, Creat, Gluc, K, Na, BUN)   Result Value Ref Range    Sodium 138 133 - 144 mmol/L    Potassium 4.2 3.4 - 5.3 mmol/L    Chloride 105 94 - 109 mmol/L    Carbon Dioxide (CO2) 27 20 - 32 mmol/L    Anion Gap 6 3 - 14 mmol/L    Urea Nitrogen 16 7 - 30 mg/dL    Creatinine 0.51 (L) 0.52 - 1.04 mg/dL    Calcium 8.5 8.5 - 10.1 mg/dL    Glucose 102 (H) 70 - 99 mg/dL    GFR Estimate >90 >60 mL/min/1.73m2      Comment:      Effective December 21, 2021 eGFRcr in adults is calculated using the 2021 CKD-EPI creatinine equation which includes age and gender (Clarisa higuera al., NEJM, DOI: 10.1056/IDMLal3123483)   Lipid panel reflex to direct LDL Fasting   Result Value Ref Range    Cholesterol 205 (H) <200 mg/dL    Triglycerides 363 (H) <150 mg/dL    Direct Measure HDL 79 >=50 mg/dL    LDL Cholesterol Calculated 53 <=100 mg/dL    Non HDL Cholesterol 126 <130 mg/dL    Patient Fasting > 8hrs? No      Narrative    Cholesterol  Desirable:  <200 mg/dL    Triglycerides  Normal:  Less than 150 mg/dL  Borderline High:  150-199 mg/dL  High:  200-499 mg/dL  Very High:  Greater than or equal to 500 mg/dL    Direct Measure HDL  Female:  Greater than or equal to 50 mg/dL   Male:  Greater than or equal to 40 mg/dL    LDL Cholesterol  Desirable:  <100mg/dL  Above Desirable:  100-129 mg/dL   Borderline High:  130-159 mg/dL   High:  160-189 mg/dL   Very High:  >= 190 mg/dL    Non HDL Cholesterol  Desirable:  130 mg/dL  Above Desirable:  130-159 mg/dL  Borderline High:  160-189 mg/dL  High:  190-219 mg/dL  Very High:  Greater than or equal to 220 mg/dL   TSH WITH FREE T4 REFLEX   Result Value Ref Range    TSH 1.90 0.40 - 4.00 mU/L       If you have any questions or concerns, please call the clinic at the number listed above.       Sincerely,      Kylie Coffman NP

## 2022-06-21 NOTE — PROGRESS NOTES
SUBJECTIVE:   CC: Elisa Al is an 56 year old woman who presents for preventive health visit.       Patient has been advised of split billing requirements and indicates understanding: Yes  Healthy Habits:     Getting at least 3 servings of Calcium per day:  Yes    Bi-annual eye exam:  Yes    Dental care twice a year:  NO    Sleep apnea or symptoms of sleep apnea:  None    Diet:  Regular (no restrictions)    Frequency of exercise:  None    Taking medications regularly:  Yes    Medication side effects:  None    PHQ-2 Total Score: 0    Additional concerns today:  No          Hyperlipidemia Follow-Up      Are you regularly taking any medication or supplement to lower your cholesterol?   Yes- .    Are you having muscle aches or other side effects that you think could be caused by your cholesterol lowering medication?  No    Depression Followup    How are you doing with your depression since your last visit? No change    Are you having other symptoms that might be associated with depression? No    Have you had a significant life event?  OTHER:  lump neck, dog surgery     Are you feeling anxious or having panic attacks?   No    Do you have any concerns with your use of alcohol or other drugs? No    Social History     Tobacco Use     Smoking status: Current Every Day Smoker     Packs/day: 0.25     Years: 23.00     Pack years: 5.75     Smokeless tobacco: Former User     Quit date: 1/19/2013   Substance Use Topics     Alcohol use: Yes     Alcohol/week: 0.0 standard drinks     Comment: nightly     Drug use: No     PHQ 6/28/2021 9/16/2021 6/21/2022   PHQ-9 Total Score 5 7 1   Q9: Thoughts of better off dead/self-harm past 2 weeks Not at all Not at all Not at all     KHANG-7 SCORE 2/21/2018 12/12/2018 8/14/2019   Total Score 0 16 12     Last PHQ-9 6/21/2022   1.  Little interest or pleasure in doing things 0   2.  Feeling down, depressed, or hopeless 1   3.  Trouble falling or staying asleep, or sleeping too much 0    4.  Feeling tired or having little energy 0   5.  Poor appetite or overeating 0   6.  Feeling bad about yourself 0   7.  Trouble concentrating 0   8.  Moving slowly or restless 0   Q9: Thoughts of better off dead/self-harm past 2 weeks 0   PHQ-9 Total Score 1   Difficulty at work, home, or with people -     KHANG-7  8/14/2019   1. Feeling nervous, anxious, or on edge 3   2. Not being able to stop or control worrying 3   3. Worrying too much about different things 3   4. Trouble relaxing 1   5. Being so restless that it is hard to sit still 0   6. Becoming easily annoyed or irritable 1   7. Feeling afraid, as if something awful might happen 1   KHANG-7 Total Score 12   If you checked any problems, how difficult have they made it for you to do your work, take care of things at home, or get along with other people? Not difficult at all       Suicide Assessment Five-step Evaluation and Treatment (SAFE-T)    Hypothyroidism Follow-up      Since last visit, patient describes the following symptoms: Weight stable, no hair loss, no skin changes, no constipation, no loose stools      Today's PHQ-2 Score:   PHQ-2 ( 1999 Pfizer) 6/21/2022   Q1: Little interest or pleasure in doing things 0   Q2: Feeling down, depressed or hopeless 0   PHQ-2 Score 0   PHQ-2 Total Score (12-17 Years)- Positive if 3 or more points; Administer PHQ-A if positive -   Q1: Little interest or pleasure in doing things Not at all   Q2: Feeling down, depressed or hopeless Not at all   PHQ-2 Score 0       Abuse: Current or Past (Physical, Sexual or Emotional) - No  Do you feel safe in your environment? Yes        Social History     Tobacco Use     Smoking status: Current Every Day Smoker     Packs/day: 0.25     Years: 23.00     Pack years: 5.75     Smokeless tobacco: Former User     Quit date: 1/19/2013   Substance Use Topics     Alcohol use: Yes     Alcohol/week: 0.0 standard drinks     Comment: nightly     If you drink alcohol do you typically have >3  drinks per day or >7 drinks per week? Yes      Alcohol Use 6/21/2022   Prescreen: >3 drinks/day or >7 drinks/week? Yes   Prescreen: >3 drinks/day or >7 drinks/week? -   AUDIT SCORE  7     AUDIT - Alcohol Use Disorders Identification Test - Reproduced from the World Health Organization Audit 2001 (Second Edition) 6/21/2022   1.  How often do you have a drink containing alcohol? 4 or more times a week   2.  How many drinks containing alcohol do you have on a typical day when you are drinking? 3 or 4   3.  How often do you have five or more drinks on one occasion? Monthly   4.  How often during the last year have you found that you were not able to stop drinking once you had started? Never   5.  How often during the last year have you failed to do what was normally expected of you because of drinking? Never   6.  How often during the last year have you needed a first drink in the morning to get yourself going after a heavy drinking session? Never   7.  How often during the last year have you had a feeling of guilt or remorse after drinking? Never   8.  How often during the last year have you been unable to remember what happened the night before because of your drinking? Never   9.  Have you or someone else been injured because of your drinking? No   10. Has a relative, friend, doctor or other health care worker been concerned about your drinking or suggested you cut down? No   TOTAL SCORE 7       Reviewed orders with patient.  Reviewed health maintenance and updated orders accordingly - Yes  Lab work is in process    Breast Cancer Screening:    Breast CA Risk Assessment (FHS-7) 6/28/2021   Do you have a family history of breast, colon, or ovarian cancer? No / Unknown         Mammogram Screening: Recommended mammography every 1-2 years with patient discussion and risk factor consideration  Pertinent mammograms are reviewed under the imaging tab.    History of abnormal Pap smear: NO - age 30-65 PAP every 5 years with  negative HPV co-testing recommended     Reviewed and updated as needed this visit by clinical staff   Tobacco  Allergies  Meds   Med Hx  Surg Hx  Fam Hx  Soc Hx          Reviewed and updated as needed this visit by Provider                   Past Medical History:   Diagnosis Date     Anxiety      Benign neoplasm of other specified sites of skin 4/6/2006    Finger - right third finger proximal palmar surface     Degenerative disk disease 12/18/12    anterior exposure of L4-L5 and L5-S1 disk spaces for fusion     Depressive disorder      Gout, unspecified      Other and unspecified ovarian cyst      Unspecified hypothyroidism years     Unspecified intestinal obstruction 02/02/2005    Partial bowel obstruction, Obstipation.      Past Surgical History:   Procedure Laterality Date     ARTHROPLASTY KNEE Left 2/9/2022    Procedure: left total knee replacement;  Surgeon: Sahil Cottrell DO;  Location: [facility]     CARPAL TUNNEL RELEASE RT/LT  2005    right wrist     COLONOSCOPY N/A 8/16/2021    Procedure: COLONOSCOPY, WITH POLYPECTOMY;  Surgeon: Celso Shipley MD;  Location: [facility]     EXPLORE SPINE, REMOVE HARDWARE, COMBINED  4/7/2014    Procedure: COMBINED EXPLORE SPINE, REMOVE HARDWARE;  Hardware Removal Lumbar 4-Sacral 1, Exploration of Fusion;  Surgeon: Brenton Ashford MD;  Location: [facility] OR     FUSION LUMBAR ANTERIOR THREE+ LEVELS       HC EXCISION TENDON SHEATH LESION, HAND/FINGR  04/17/2006    Right middle finger flexor sheath MP joint.     HC EXCISION TENDON SHEATH LESION, HAND/FINGR  08/07/06    Right middle finger     HC LAPAROSCOPY, SURGICAL; CHOLECYSTECTOMY  4/12/2004    Cholecystectomy, Laparoscopic     HC REMOVAL OF OVARY/TUBE(S)      left ovary removed secondary to cyst     HC REMOVAL OF TONSILS,12+ Y/O      Tonsils 12+y.o.     HC REVISE MEDIAN N/CARPAL TUNNEL SURG  8/22/2005    Left     ZZC LAP,ABD/PERIT/OMENTUM,UNLIST  04/12/10    pelvic adhesion     ZZC TOTAL ABDOM HYSTERECTOMY   09/08/2004    SERGIO, Cystoscopy.     [facility] COLONOSCOPY W BIOPSY  04/26/10     [facility] COLONOSCOPY W/WO BRUSH/WASH  5/25/2005     [facility] UGI ENDOSCOPY, SIMPLE EXAM  5/25/2005       Review of Systems   Constitutional: Negative for chills and fever.   HENT: Positive for congestion. Negative for ear pain, hearing loss and sore throat.    Eyes: Negative for pain and visual disturbance.   Respiratory: Negative for cough and shortness of breath.    Cardiovascular: Positive for peripheral edema. Negative for chest pain and palpitations.   Gastrointestinal: Positive for abdominal pain. Negative for constipation, diarrhea, heartburn, hematochezia and nausea.   Genitourinary: Negative for dysuria, frequency, genital sores, hematuria, pelvic pain, urgency and vaginal bleeding.   Musculoskeletal: Positive for arthralgias and joint swelling. Negative for myalgias.   Skin: Negative for rash.   Neurological: Positive for headaches. Negative for dizziness, weakness and paresthesias.   Psychiatric/Behavioral: Negative for mood changes. The patient is nervous/anxious.      OBJECTIVE:   Temp 97.5  F (36.4  C) (Temporal)   Ht 1.524 m (5')   Wt 71.8 kg (158 lb 3.2 oz)   LMP 01/13/2004   SpO2 99%   BMI 30.90 kg/m    Physical Exam  GENERAL: healthy, alert and no distress  EYES: Eyes grossly normal to inspection, PERRL and conjunctivae and sclerae normal  HENT: ear canals and TM's normal, nose and mouth without ulcers or lesions  NECK: no adenopathy, no asymmetry, masses, or scars and thyroid normal to palpation  RESP: lungs clear to auscultation - no rales, rhonchi or wheezes  BREAST: normal without masses, tenderness or nipple discharge and no palpable axillary masses or adenopathy  CV: regular rate and rhythm, normal S1 S2, no S3 or S4, no murmur, click or rub, no peripheral edema and peripheral pulses strong  ABDOMEN: soft, nontender, no hepatosplenomegaly, no masses and bowel sounds normal  MS: no gross musculoskeletal defects noted,  no edema  SKIN: no suspicious lesions or rashes  NEURO: Normal strength and tone, mentation intact and speech normal  PSYCH: mentation appears normal, affect normal/bright    Diagnostic Test Results:  Labs reviewed in Epic  No results found for this or any previous visit (from the past 24 hour(s)).    ASSESSMENT/PLAN:   (Z00.00) Routine general medical examination at a health care facility  (primary encounter diagnosis)  Comment: recommend yearly physical    (F32.5) Major depression in remission (H)  Comment: PHQ-9 is 1.  Good control    (E78.5) Hyperlipidemia LDL goal <130  Comment: stable on meds.  Check labs  Plan: rosuvastatin (CRESTOR) 5 MG tablet, Lipid panel        reflex to direct LDL Fasting    (E03.9) Hypothyroidism, unspecified type  Comment: stable- asymptomatic  Plan: TSH WITH FREE T4 REFLEX, Basic metabolic panel         (Ca, Cl, CO2, Creat, Gluc, K, Na, BUN),         levothyroxine (SYNTHROID/LEVOTHROID) 100 MCG         tablet    (E55.9) Vitamin D deficiency  Comment: screen  Plan: Vitamin D Deficiency      Patient has been advised of split billing requirements and indicates understanding: Yes    COUNSELING:  Reviewed preventive health counseling, as reflected in patient instructions    Estimated body mass index is 30.9 kg/m  as calculated from the following:    Height as of this encounter: 1.524 m (5').    Weight as of this encounter: 71.8 kg (158 lb 3.2 oz).    Weight management plan: Discussed healthy diet and exercise guidelines    She reports that she has been smoking. She has a 5.75 pack-year smoking history. She quit smokeless tobacco use about 9 years ago.  Tobacco Cessation Action Plan:   Information offered: Patient not interested at this time      Counseling Resources:  ATP IV Guidelines  Pooled Cohorts Equation Calculator  Breast Cancer Risk Calculator  BRCA-Related Cancer Risk Assessment: FHS-7 Tool  FRAX Risk Assessment  ICSI Preventive Guidelines  Dietary Guidelines for Americans,  2010  [employer] MyPlate  ASA Prophylaxis  Lung CA Screening    Kylie Coffman NP  [facility]  Answers for HPI/ROS submitted by the patient on 6/21/2022  If you checked off any problems, how difficult have these problems made it for you to do your work, take care of things at home, or get along with other people?: Not difficult at all  PHQ9 TOTAL SCORE: 1

## 2022-06-21 NOTE — PATIENT INSTRUCTIONS
Orthopedics [phone]  Try adding vitamin D 1000 units   Preventive Health Recommendations  Female Ages 50 - 64    Yearly exam: See your health care provider every year in order to  o Review health changes.   o Discuss preventive care.    o Review your medicines if your doctor has prescribed any.      Get a Pap test every three years (unless you have an abnormal result and your provider advises testing more often).    If you get Pap tests with HPV test, you only need to test every 5 years, unless you have an abnormal result.     You do not need a Pap test if your uterus was removed (hysterectomy) and you have not had cancer.    You should be tested each year for STDs (sexually transmitted diseases) if you're at risk.     Have a mammogram every 1 to 2 years.    Have a colonoscopy at age 50, or have a yearly FIT test (stool test). These exams screen for colon cancer.      Have a cholesterol test every 5 years, or more often if advised.    Have a diabetes test (fasting glucose) every three years. If you are at risk for diabetes, you should have this test more often.     If you are at risk for osteoporosis (brittle bone disease), think about having a bone density scan (DEXA).    Shots: Get a flu shot each year. Get a tetanus shot every 10 years.    Nutrition:     Eat at least 5 servings of fruits and vegetables each day.    Eat whole-grain bread, whole-wheat pasta and brown rice instead of white grains and rice.    Get adequate Calcium and Vitamin D.     Lifestyle    Exercise at least 150 minutes a week (30 minutes a day, 5 days a week). This will help you control your weight and prevent disease.    Limit alcohol to one drink per day.    No smoking.     Wear sunscreen to prevent skin cancer.     See your dentist every six months for an exam and cleaning.    See your eye doctor every 1 to 2 years.

## 2022-06-23 RX ORDER — ROSUVASTATIN CALCIUM 10 MG/1
10 TABLET, COATED ORAL DAILY
Qty: 90 TABLET | Refills: 1 | Status: SHIPPED | OUTPATIENT
Start: 2022-06-23 | End: 2022-12-20

## 2022-06-23 NOTE — PROGRESS NOTES
"Elisa Al is a 54 year old female who is being evaluated via a billable telephone visit.     Telephone visit performed in lieu of an in-person visit due to current concerns regarding the current COVID-19 situation including social distancing and keeping at risk people safe.  Patient agreed to proceed with this visit due to these circumstances.      The patient has been notified of following:     \"This telephone visit will be conducted via a call between you and your physician/provider. We have found that certain health care needs can be provided without the need for a physical exam.  This service lets us provide the care you need with a short phone conversation.  If a prescription is necessary we can send it directly to your pharmacy.  If lab work is needed we can place an order for that and you can then stop by our lab to have the test done at a later time.    Telephone visits are billed at different rates depending on your insurance coverage. During this emergency period, for some insurers they may be billed the same as an in-person visit.  Please reach out to your insurance provider with any questions.    If during the course of the call the physician/provider feels a telephone visit is not appropriate, you will not be charged for this service.\"    Patient has given verbal consent for Telephone visit?  Yes    How would you like to obtain your AVS?     Subjective     Elisa Al is a 54 year old female who presents to clinic today for the following health issues:               Patient Active Problem List   Diagnosis     Pain in joint, shoulder region     CARDIOVASCULAR SCREENING; LDL GOAL LESS THAN 160     Hypothyroidism, unspecified type     Major depression in complete remission (H)     Primary osteoarthritis of left knee     Past Surgical History:   Procedure Laterality Date     C LAP,ABD/PERIT/OMENTUM,UNLIST  04/12/10    pelvic adhesion     C TOTAL ABDOM HYSTERECTOMY  09/08/2004    TAMOISÉS, Cystoscopy. " Viral Upper Respiratory Illness (Adult)  You have a viral upper respiratory illness (URI), which is another term for the common cold. This illness is contagious during the first few days. It is spread through the air by coughing and sneezing. It may also be spread by direct contact (touching the sick person and then touching your own eyes, nose, or mouth). Frequent handwashing will decrease risk of spread. Most viral illnesses go away within 7 to 10 days with rest and simple home remedies. Sometimes the illness may last for several weeks. Antibiotics will not kill a virus, and they are generally not prescribed for this condition.  Home care  If symptoms are severe, rest at home for the first 2 to 3 days. When you resume activity, don't let yourself get too tired.  Don't smoke. If you need help stopping, talk with your healthcare provider.  Avoid being exposed to cigarette smoke (yours or others’).  You may use acetaminophen or ibuprofen to control pain and fever, unless another medicine was prescribed. If you have chronic liver or kidney disease, have ever had a stomach ulcer or gastrointestinal bleeding, or are taking blood-thinning medicines, talk with your healthcare provider before using these medicines. Aspirin should never be given to anyone under 18 years of age who is ill with a viral infection or fever. It may cause severe liver or brain damage.  Your appetite may be poor, so a light diet is fine. Stay well hydrated by drinking 6 to 8 glasses of fluids per day (water, soft drinks, juices, tea, or soup). Extra fluids will help loosen secretions in the nose and lungs.  Over-the-counter cold medicines will not shorten the length of time you’re sick, but they may be helpful for the following symptoms: cough, sore throat, and nasal and sinus congestion. If you take prescription medicines, ask your healthcare provider or pharmacist which over-the-counter medicines are safe to use. (Note: Don't use decongestants      CARPAL TUNNEL RELEASE RT/LT  2005    right wrist     EXPLORE SPINE, REMOVE HARDWARE, COMBINED  4/7/2014    Procedure: COMBINED EXPLORE SPINE, REMOVE HARDWARE;  Hardware Removal Lumbar 4-Sacral 1, Exploration of Fusion;  Surgeon: Brenton Ashford MD;  Location: [facility]     FUSION LUMBAR ANTERIOR THREE+ LEVELS       HC COLONOSCOPY W BIOPSY  04/26/10     HC COLONOSCOPY W/WO BRUSH/WASH  5/25/2005     HC EXCISION TENDON SHEATH LESION, HAND/FINGR  04/17/2006    Right middle finger flexor sheath MP joint.     HC EXCISION TENDON SHEATH LESION, HAND/FINGR  08/07/06    Right middle finger     HC LAPAROSCOPY, SURGICAL; CHOLECYSTECTOMY  4/12/2004    Cholecystectomy, Laparoscopic     HC REMOVAL OF OVARY/TUBE(S)      left ovary removed secondary to cyst     HC REMOVAL OF TONSILS,12+ Y/O      Tonsils 12+y.o.     HC REVISE MEDIAN N/CARPAL TUNNEL SURG  8/22/2005    Left     HC UGI ENDOSCOPY, SIMPLE EXAM  5/25/2005       Social History     Tobacco Use     Smoking status: Current Every Day Smoker     Packs/day: 0.25     Years: 23.00     Pack years: 5.75     Smokeless tobacco: Former User     Quit date: 1/19/2013   Substance Use Topics     Alcohol use: Yes     Alcohol/week: 0.0 standard drinks     Comment: nightly     Family History   Problem Relation Age of Onset     Blood Disease Mother         leukemia-passed 2008     Cardiovascular Father         triple bypass     Respiratory Father         Black Lung     Alzheimer Disease Father      Cancer Maternal Grandmother         ovarian/brain ca         Elisa reports left foot pain for about 1 month.  It is swollen and very tender and she cannot put weight on it.  She can walk but she is limping significantly, essentially hopping.  She developed a viral illness on February 10.  She is concerned it may have been coronavirus or influenza and was very sick for about a week, with ongoing symptoms for a total of about 6 weeks.  Her foot pain started in about 4 weeks after the onset of  if you have high blood pressure.)  When to seek medical advice  Call your healthcare provider right away if any of these occur:  Cough with lots of colored sputum (mucus)  Severe headache; face, neck, or ear pain  Difficulty swallowing due to throat pain  Fever of 100.4°F (38°C) or higher, or as directed by your healthcare provider    illness.  She wonders if it was related.  Then about 2 weeks later the rest of her viral symptoms really went away but the foot pain persists.  She also still has a persistent cough but no fever no other symptoms of illness.  She thinks she may have pulled a muscle in the chest from coughing.    I advised Elisa that she really should come in for a visit in person to evaluate this better.  I am not charging her for this telephone visit but she will be seen in the clinic today.      Assessment/Plan:    ICD-10-CM    1. Left foot pain  M79.672    2. Cough  R05         No follow-ups on file.      Phone call duration:  6 minutes    Amina Gibbons MD

## 2022-06-29 RX ORDER — ROSUVASTATIN CALCIUM 5 MG/1
TABLET, COATED ORAL
Qty: 30 TABLET | Refills: 1 | OUTPATIENT
Start: 2022-06-29

## 2022-08-02 ENCOUNTER — OFFICE VISIT (OUTPATIENT)
Dept: ORTHOPEDICS | Facility: CLINIC | Age: 57
End: 2022-08-02

## 2022-08-02 ENCOUNTER — ANCILLARY PROCEDURE (OUTPATIENT)
Dept: GENERAL RADIOLOGY | Facility: CLINIC | Age: 57
End: 2022-08-02
Attending: ORTHOPAEDIC SURGERY
Payer: COMMERCIAL

## 2022-08-02 VITALS
WEIGHT: 158 LBS | RESPIRATION RATE: 18 BRPM | HEART RATE: 101 BPM | DIASTOLIC BLOOD PRESSURE: 97 MMHG | HEIGHT: 60 IN | BODY MASS INDEX: 31.02 KG/M2 | SYSTOLIC BLOOD PRESSURE: 175 MMHG

## 2022-08-02 DIAGNOSIS — Z96.652 S/P TKR (TOTAL KNEE REPLACEMENT) USING CEMENT, LEFT: ICD-10-CM

## 2022-08-02 DIAGNOSIS — Z96.652 S/P TKR (TOTAL KNEE REPLACEMENT) USING CEMENT, LEFT: Primary | ICD-10-CM

## 2022-08-02 PROCEDURE — 73562 X-RAY EXAM OF KNEE 3: CPT | Mod: TC | Performed by: RADIOLOGY

## 2022-08-02 PROCEDURE — 99213 OFFICE O/P EST LOW 20 MIN: CPT | Performed by: ORTHOPAEDIC SURGERY

## 2022-08-02 NOTE — LETTER
8/2/2022         RE: Elisa Al  [address] [facility] [id number]        Dear Colleague,    Thank you for referring your patient, Elisa Al, to the [facility]. Please see a copy of my visit note below.    Elisa Al is seen for follow up left total knee arthroplasty from 2/9/22 by Dr. Sahil Cottrell .  She complains of pain and swelling in knee.  Also complains of noise and clicking in knee if she bends knee and jiggles leg.  Exercise:  walks    Past Medical History:   Diagnosis Date     Anxiety      Benign neoplasm of other specified sites of skin 4/6/2006    Finger - right third finger proximal palmar surface     Degenerative disk disease 12/18/12    anterior exposure of L4-L5 and L5-S1 disk spaces for fusion     Depressive disorder      Gout, unspecified      Other and unspecified ovarian cyst      Unspecified hypothyroidism years     Unspecified intestinal obstruction 02/02/2005    Partial bowel obstruction, Obstipation.       Past Surgical History:   Procedure Laterality Date     ARTHROPLASTY KNEE Left 2/9/2022    Procedure: left total knee replacement;  Surgeon: Sahil Cottrell DO;  Location: [facility] OR     CARPAL TUNNEL RELEASE RT/LT  2005    right wrist     COLONOSCOPY N/A 8/16/2021    Procedure: COLONOSCOPY, WITH POLYPECTOMY;  Surgeon: Celso Shipley MD;  Location: [facility] GI     EXPLORE SPINE, REMOVE HARDWARE, COMBINED  4/7/2014    Procedure: COMBINED EXPLORE SPINE, REMOVE HARDWARE;  Hardware Removal Lumbar 4-Sacral 1, Exploration of Fusion;  Surgeon: Brenton Ashford MD;  Location: [facility] OR     FUSION LUMBAR ANTERIOR THREE+ LEVELS       HC EXCISION TENDON SHEATH LESION, HAND/FINGR  04/17/2006    Right middle finger flexor sheath MP joint.     HC EXCISION TENDON SHEATH LESION, HAND/FINGR  08/07/06    Right middle finger     HC LAPAROSCOPY, SURGICAL; CHOLECYSTECTOMY  4/12/2004    Cholecystectomy, Laparoscopic     HC REMOVAL OF OVARY/TUBE(S)      left  ovary removed secondary to cyst     HC REMOVAL OF TONSILS,12+ Y/O      Tonsils 12+y.o.     HC REVISE MEDIAN N/CARPAL TUNNEL SURG  8/22/2005    Left     ZZC LAP,ABD/PERIT/OMENTUM,UNLIST  04/12/10    pelvic adhesion     ZZC TOTAL ABDOM HYSTERECTOMY  09/08/2004    TAHRSO, Cystoscopy.     ZZ COLONOSCOPY W BIOPSY  04/26/10     ZZ COLONOSCOPY W/WO BRUSH/WASH  5/25/2005     [facility] UGI ENDOSCOPY, SIMPLE EXAM  5/25/2005       Family History   Problem Relation Age of Onset     Blood Disease Mother         leukemia-passed 2008     Cardiovascular Father         triple bypass     Respiratory Father         Black Lung     Alzheimer Disease Father      Cancer Maternal Grandmother         ovarian/brain ca       Social History     Socioeconomic History     Marital status:      Spouse name: Melo     Number of children: 0     Years of education: Not on file     Highest education level: Not on file   Occupational History     Employer: [employer]   Tobacco Use     Smoking status: Current Every Day Smoker     Packs/day: 0.25     Years: 23.00     Pack years: 5.75     Smokeless tobacco: Former User     Quit date: 1/19/2013   Substance and Sexual Activity     Alcohol use: Yes     Alcohol/week: 0.0 standard drinks     Comment: nightly     Drug use: No     Sexual activity: Yes     Partners: Male   Other Topics Concern     Parent/sibling w/ CABG, MI or angioplasty before 65F 55M? Not Asked   Social History Narrative     Not on file     Social Determinants of Health     Financial Resource Strain: Not on file   Food Insecurity: Not on file   Transportation Needs: Not on file   Physical Activity: Not on file   Stress: Not on file   Social Connections: Not on file   Intimate Partner Violence: Not on file   Housing Stability: Not on file       Current Outpatient Medications   Medication Sig Dispense Refill     acetaminophen (TYLENOL) 325 MG tablet Take 3 tablets (975 mg) by mouth every 8 hours as needed for pain 100 tablet 0      levothyroxine (SYNTHROID/LEVOTHROID) 100 MCG tablet Take 1 tablet (100 mcg) by mouth daily Appointment needed for additional refills. 90 tablet 3     pseudoePHEDrine (SUDAFED) 120 MG 12 hr tablet Take 120 mg by mouth daily as needed for congestion        rosuvastatin (CRESTOR) 10 MG tablet Take 1 tablet (10 mg) by mouth daily for 90 days 90 tablet 1     sertraline (ZOLOFT) 100 MG tablet Take 1.5 tablets (150 mg) by mouth daily for 90 days 135 tablet 3       Allergies   Allergen Reactions     Aspirin Swelling     Erythromycin Ethylsuccinate GI Disturbance     gi upset     Ibuprofen Swelling     Naproxen      Nsaids Swelling     Zelnorm [Tegaserod Maleate]        REVIEW OF SYSTEMS:  CONSTITUTIONAL:  NEGATIVE for fever, chills, change in weight, not feeling tired  SKIN:  NEGATIVE for worrisome rashes, no skin lumps, no skin ulcers and no non-healing wounds  EYES:  NEGATIVE for vision changes or irritation.  ENT/MOUTH:  NEGATIVE.  No hearing loss, no hoarseness, no difficulty swallowing.  RESP:  NEGATIVE. No cough or shortness of breath.  BREAST:  NEGATIVE for masses, tenderness or discharge  CV:  NEGATIVE for chest pain, palpitations or peripheral edema  GI:  NEGATIVE for nausea, abdominal pain, heartburn, or change in bowel habits  :  Negative. No dysuria, no hematuria  MUSCULOSKELETAL:  See HPI above  NEURO:  NEGATIVE . No headaches, no dizziness,  no numbness  ENDOCRINE:  NEGATIVE for temperature intolerance, skin/hair changes  HEME/ALLERGY/IMMUNE:  NEGATIVE for bleeding problems  PSYCHIATRIC:  NEGATIVE. no anxiety, no depression.      Xray:  Xray images were independently visualized with the patient.  This shows good position of components.  No sign of loosening or wear.     Exam:  Vitals: BP (!) 175/97   Pulse 101   Resp 18   Ht 1.524 m (5')   Wt 71.7 kg (158 lb)   LMP 01/13/2004   BMI 30.86 kg/m    BMI= Body mass index is 30.86 kg/m .  Range of motion right 0-130 degrees, left 2-120 degrees.  Alignment   Normal.  Stability:   Right       Lateral collateral ligament no laxity       Medial collateral ligament no laxity  Left:       Lateral collateral ligament no laxity       Medial collateral ligament no laxity  With flexion of 30 degrees, there is the expected toggle and mechanical joint noise.  Wound:  Well healed.  Edema: None  Effusion: mild  Tenderness: Right None       Left:  Minor - lateral joint line and medial joint line.    Sensation, motor, and circulation intact.    Assessment:    left total knee arthroplasty with persistent effusion and pain.  No problem on x-ray.  Plan:  Tubigrip applied.   Could try Voltaren gel, but she says she has allergies to NSAIDs  Resume activity as tolerated.    Return to clinic 1 year with xray left knee.  Continue antibiotics for dental work.  I recommend this forever.      Again, thank you for allowing me to participate in the care of your patient.        Sincerely,        Melo Mendez MD

## 2022-09-04 ENCOUNTER — APPOINTMENT (OUTPATIENT)
Dept: CT IMAGING | Facility: CLINIC | Age: 57
End: 2022-09-04
Attending: EMERGENCY MEDICINE
Payer: COMMERCIAL

## 2022-09-04 ENCOUNTER — HOSPITAL ENCOUNTER (EMERGENCY)
Facility: CLINIC | Age: 57
Discharge: HOME | End: 2022-09-04
Attending: EMERGENCY MEDICINE | Admitting: EMERGENCY MEDICINE
Payer: COMMERCIAL

## 2022-09-04 VITALS
HEART RATE: 80 BPM | OXYGEN SATURATION: 95 % | SYSTOLIC BLOOD PRESSURE: 140 MMHG | TEMPERATURE: 98.4 F | DIASTOLIC BLOOD PRESSURE: 73 MMHG | RESPIRATION RATE: 14 BRPM

## 2022-09-04 DIAGNOSIS — M54.2 NECK PAIN: ICD-10-CM

## 2022-09-04 DIAGNOSIS — S09.90XA INJURY OF HEAD, INITIAL ENCOUNTER: ICD-10-CM

## 2022-09-04 LAB
BASOPHILS # BLD AUTO: 0 10E3/UL (ref 0–0.2)
BASOPHILS NFR BLD AUTO: 1 %
EOSINOPHIL # BLD AUTO: 0.1 10E3/UL (ref 0–0.7)
EOSINOPHIL NFR BLD AUTO: 2 %
ERYTHROCYTE [DISTWIDTH] IN BLOOD BY AUTOMATED COUNT: 11.9 % (ref 10–15)
HCT VFR BLD AUTO: 41 % (ref 35–47)
HGB BLD-MCNC: 14.2 G/DL (ref 11.7–15.7)
IMM GRANULOCYTES # BLD: 0 10E3/UL
IMM GRANULOCYTES NFR BLD: 1 %
LYMPHOCYTES # BLD AUTO: 1.8 10E3/UL (ref 0.8–5.3)
LYMPHOCYTES NFR BLD AUTO: 33 %
MCH RBC QN AUTO: 31.6 PG (ref 26.5–33)
MCHC RBC AUTO-ENTMCNC: 34.6 G/DL (ref 31.5–36.5)
MCV RBC AUTO: 91 FL (ref 78–100)
MONOCYTES # BLD AUTO: 0.4 10E3/UL (ref 0–1.3)
MONOCYTES NFR BLD AUTO: 8 %
NEUTROPHILS # BLD AUTO: 3.2 10E3/UL (ref 1.6–8.3)
NEUTROPHILS NFR BLD AUTO: 55 %
NRBC # BLD AUTO: 0 10E3/UL
NRBC BLD AUTO-RTO: 0 /100
PLATELET # BLD AUTO: 175 10E3/UL (ref 150–450)
RBC # BLD AUTO: 4.5 10E6/UL (ref 3.8–5.2)
WBC # BLD AUTO: 5.6 10E3/UL (ref 4–11)

## 2022-09-04 PROCEDURE — 99285 EMERGENCY DEPT VISIT HI MDM: CPT | Performed by: EMERGENCY MEDICINE

## 2022-09-04 PROCEDURE — 99285 EMERGENCY DEPT VISIT HI MDM: CPT | Mod: 25 | Performed by: EMERGENCY MEDICINE

## 2022-09-04 PROCEDURE — 96376 TX/PRO/DX INJ SAME DRUG ADON: CPT | Performed by: EMERGENCY MEDICINE

## 2022-09-04 PROCEDURE — 96361 HYDRATE IV INFUSION ADD-ON: CPT | Performed by: EMERGENCY MEDICINE

## 2022-09-04 PROCEDURE — 96375 TX/PRO/DX INJ NEW DRUG ADDON: CPT | Performed by: EMERGENCY MEDICINE

## 2022-09-04 PROCEDURE — 96374 THER/PROPH/DIAG INJ IV PUSH: CPT | Performed by: EMERGENCY MEDICINE

## 2022-09-04 PROCEDURE — 250N000011 HC RX IP 250 OP 636: Performed by: EMERGENCY MEDICINE

## 2022-09-04 PROCEDURE — 258N000003 HC RX IP 258 OP 636: Performed by: EMERGENCY MEDICINE

## 2022-09-04 PROCEDURE — 36415 COLL VENOUS BLD VENIPUNCTURE: CPT | Performed by: EMERGENCY MEDICINE

## 2022-09-04 PROCEDURE — 70450 CT HEAD/BRAIN W/O DYE: CPT

## 2022-09-04 PROCEDURE — 72125 CT NECK SPINE W/O DYE: CPT

## 2022-09-04 PROCEDURE — 85014 HEMATOCRIT: CPT | Performed by: EMERGENCY MEDICINE

## 2022-09-04 RX ORDER — TRAMADOL HYDROCHLORIDE 50 MG/1
50 TABLET ORAL EVERY 6 HOURS PRN
Qty: 15 TABLET | Refills: 0 | Status: SHIPPED | OUTPATIENT
Start: 2022-09-04 | End: 2022-09-07

## 2022-09-04 RX ORDER — CYCLOBENZAPRINE HCL 10 MG
10 TABLET ORAL 3 TIMES DAILY PRN
Qty: 20 TABLET | Refills: 0 | Status: SHIPPED | OUTPATIENT
Start: 2022-09-04 | End: 2022-09-10

## 2022-09-04 RX ORDER — HYDROMORPHONE HYDROCHLORIDE 1 MG/ML
0.5 INJECTION, SOLUTION INTRAMUSCULAR; INTRAVENOUS; SUBCUTANEOUS
Status: DISCONTINUED | OUTPATIENT
Start: 2022-09-04 | End: 2022-09-04 | Stop reason: HOSPADM

## 2022-09-04 RX ORDER — SODIUM CHLORIDE 9 MG/ML
INJECTION, SOLUTION INTRAVENOUS CONTINUOUS
Status: DISCONTINUED | OUTPATIENT
Start: 2022-09-04 | End: 2022-09-04 | Stop reason: HOSPADM

## 2022-09-04 RX ORDER — ONDANSETRON 2 MG/ML
4 INJECTION INTRAMUSCULAR; INTRAVENOUS EVERY 30 MIN PRN
Status: DISCONTINUED | OUTPATIENT
Start: 2022-09-04 | End: 2022-09-04 | Stop reason: HOSPADM

## 2022-09-04 RX ADMIN — SODIUM CHLORIDE 1000 ML: 9 INJECTION, SOLUTION INTRAVENOUS at 12:32

## 2022-09-04 RX ADMIN — ONDANSETRON 4 MG: 2 INJECTION INTRAMUSCULAR; INTRAVENOUS at 12:29

## 2022-09-04 RX ADMIN — HYDROMORPHONE HYDROCHLORIDE 0.5 MG: 1 INJECTION, SOLUTION INTRAMUSCULAR; INTRAVENOUS; SUBCUTANEOUS at 14:00

## 2022-09-04 RX ADMIN — HYDROMORPHONE HYDROCHLORIDE 0.5 MG: 1 INJECTION, SOLUTION INTRAMUSCULAR; INTRAVENOUS; SUBCUTANEOUS at 12:33

## 2022-09-04 ASSESSMENT — ACTIVITIES OF DAILY LIVING (ADL)
ADLS_ACUITY_SCORE: 35
ADLS_ACUITY_SCORE: 35

## 2022-09-04 NOTE — ED PROVIDER NOTES
History     Chief Complaint   Patient presents with     Headache     Head Injury     HPI  Elisa Al is a 57 year old female who presents to the emergency department secondary to a severe headache and neck pain.  This started on Thursday after she was hit on the top of the head by a horse.  She was bringing the horse into the barn and he lifted up his chin for her to scratch his neck since the insects/flies were bothering him.  He went to get flies off of his leg and came down on with great force on the top of her head and hit the top of her head with his jaw.  It was painful immediately and she had pain in the left neck and cervical spine.  The headache did not show up right away but did later that day and has progressively worsened.  Yesterday she was bending over to do something with the horses and the pain became severe.  It is associated with photophobia and phonophobia but no nausea or vomiting.  No history of headaches or migraines.  No history of concussions.  No loss of consciousness or confusion or ataxia.    Allergies:  Allergies   Allergen Reactions     Aspirin Swelling     Erythromycin Ethylsuccinate GI Disturbance     gi upset     Ibuprofen Swelling     Naproxen      Nsaids Swelling     Zelnorm [Tegaserod Maleate]        Problem List:    Patient Active Problem List    Diagnosis Date Noted     S/P TKR (total knee replacement) using cement, left 02/09/2022     Priority: Medium     Hyperlipidemia LDL goal <130 02/04/2022     Priority: Medium     Tobacco abuse 02/04/2022     Priority: Medium     Peroneal tendonitis, left 04/13/2020     Priority: Medium     Primary osteoarthritis of left knee 09/04/2019     Priority: Medium     Hypothyroidism, unspecified type 02/21/2018     Priority: Medium     Major depression in complete remission (H) 02/21/2018     Priority: Medium     CARDIOVASCULAR SCREENING; LDL GOAL LESS THAN 160 10/31/2010     Priority: Medium     Pain in joint, shoulder region 12/05/2006      Priority: Medium        Past Medical History:    Past Medical History:   Diagnosis Date     Anxiety      Benign neoplasm of other specified sites of skin 4/6/2006     Degenerative disk disease 12/18/12     Depressive disorder      Gout, unspecified      Other and unspecified ovarian cyst      Unspecified hypothyroidism years     Unspecified intestinal obstruction 02/02/2005       Past Surgical History:    Past Surgical History:   Procedure Laterality Date     ARTHROPLASTY KNEE Left 2/9/2022    Procedure: left total knee replacement;  Surgeon: Sahil Cottrell DO;  Location: [facility]     CARPAL TUNNEL RELEASE RT/LT  2005    right wrist     COLONOSCOPY N/A 8/16/2021    Procedure: COLONOSCOPY, WITH POLYPECTOMY;  Surgeon: Celso Shipley MD;  Location: [facility]     EXPLORE SPINE, REMOVE HARDWARE, COMBINED  4/7/2014    Procedure: COMBINED EXPLORE SPINE, REMOVE HARDWARE;  Hardware Removal Lumbar 4-Sacral 1, Exploration of Fusion;  Surgeon: Brenton Ashford MD;  Location: [facility] OR     FUSION LUMBAR ANTERIOR THREE+ LEVELS       HC EXCISION TENDON SHEATH LESION, HAND/FINGR  04/17/2006    Right middle finger flexor sheath MP joint.     HC EXCISION TENDON SHEATH LESION, HAND/FINGR  08/07/06    Right middle finger     HC LAPAROSCOPY, SURGICAL; CHOLECYSTECTOMY  4/12/2004    Cholecystectomy, Laparoscopic     HC REMOVAL OF OVARY/TUBE(S)      left ovary removed secondary to cyst     HC REMOVAL OF TONSILS,12+ Y/O      Tonsils 12+y.o.     HC REVISE MEDIAN N/CARPAL TUNNEL SURG  8/22/2005    Left     ZZC LAP,ABD/PERIT/OMENTUM,UNLIST  04/12/10    pelvic adhesion     Z TOTAL ABDOM HYSTERECTOMY  09/08/2004    [facility]ERG[facility], Cystoscopy.     [facility] COLONOSCOPY W BIOPSY  04/26/10     [facility] COLONOSCOPY W/WO BRUSH/WASH  5/25/2005     [facility] UGI ENDOSCOPY, SIMPLE EXAM  5/25/2005       Family History:    Family History   Problem Relation Age of Onset     Blood Disease Mother         leukemia-passed 2008     Cardiovascular Father         triple  bypass     Respiratory Father         Black Lung     Alzheimer Disease Father      Cancer Maternal Grandmother         ovarian/brain ca       Social History:  Marital Status:   [2]  Social History     Tobacco Use     Smoking status: Current Every Day Smoker     Packs/day: 0.25     Years: 23.00     Pack years: 5.75     Smokeless tobacco: Former User   Substance Use Topics     Alcohol use: Yes     Alcohol/week: 0.0 standard drinks     Comment: nightly     Drug use: No        Medications:    cyclobenzaprine (FLEXERIL) 10 MG tablet  traMADol (ULTRAM) 50 MG tablet  acetaminophen (TYLENOL) 325 MG tablet  levothyroxine (SYNTHROID/LEVOTHROID) 100 MCG tablet  pseudoePHEDrine (SUDAFED) 120 MG 12 hr tablet  rosuvastatin (CRESTOR) 10 MG tablet  sertraline (ZOLOFT) 100 MG tablet          Review of Systems   All other systems reviewed and are negative.      Physical Exam   BP: (!) 177/90  Pulse: 92  Temp: 98.4  F (36.9  C)  Resp: 14  SpO2: 98 %      Physical Exam  Vitals and nursing note reviewed.   Constitutional:       General: She is not in acute distress.     Appearance: She is well-developed. She is not diaphoretic.   HENT:      Head: Normocephalic and atraumatic.      Comments: No cephalohematoma no step-off or fluctuance or evidence for skull fracture     Nose: Nose normal.   Eyes:      General: No scleral icterus.     Extraocular Movements: Extraocular movements intact.   Neck:      Comments: Tenderness palpation over midline cervical spine throughout and left paraspinal cervical musculature.  Pulmonary:      Effort: Pulmonary effort is normal.   Musculoskeletal:      Cervical back: Tenderness present.   Skin:     General: Skin is warm and dry.      Coloration: Skin is not pale.      Findings: No erythema or rash.   Neurological:      Mental Status: She is alert and oriented to person, place, and time.         ED Course                 Procedures                  Results for orders placed or performed during the  hospital encounter of 09/04/22 (from the past 24 hour(s))   CBC with platelets differential    Narrative    The following orders were created for panel order CBC with platelets differential.  Procedure                               Abnormality         Status                     ---------                               -----------         ------                     CBC with platelets and d...[[id number]]                      Final result                 Please view results for these tests on the individual orders.   CBC with platelets and differential   Result Value Ref Range    WBC Count 5.6 4.0 - 11.0 10e3/uL    RBC Count 4.50 3.80 - 5.20 10e6/uL    Hemoglobin 14.2 11.7 - 15.7 g/dL    Hematocrit 41.0 35.0 - 47.0 %    MCV 91 78 - 100 fL    MCH 31.6 26.5 - 33.0 pg    MCHC 34.6 31.5 - 36.5 g/dL    RDW 11.9 10.0 - 15.0 %    Platelet Count 175 150 - 450 10e3/uL    % Neutrophils 55 %    % Lymphocytes 33 %    % Monocytes 8 %    % Eosinophils 2 %    % Basophils 1 %    % Immature Granulocytes 1 %    NRBCs per 100 WBC 0 <1 /100    Absolute Neutrophils 3.2 1.6 - 8.3 10e3/uL    Absolute Lymphocytes 1.8 0.8 - 5.3 10e3/uL    Absolute Monocytes 0.4 0.0 - 1.3 10e3/uL    Absolute Eosinophils 0.1 0.0 - 0.7 10e3/uL    Absolute Basophils 0.0 0.0 - 0.2 10e3/uL    Absolute Immature Granulocytes 0.0 <=0.4 10e3/uL    Absolute NRBCs 0.0 10e3/uL   Head CT w/o contrast    Narrative    EXAM: CT CERVICAL SPINE W/O CONTRAST, CT HEAD W/O CONTRAST  LOCATION: [facility]  DATE/TIME: 9/4/2022 1:09 PM    INDICATION: head injury after head injury by horse  COMPARISON: CT dated 7/25/2013 and 5/8/2018.  TECHNIQUE:   1) Routine CT Head without IV contrast. Multiplanar reformats. Dose reduction techniques were used.  2) Routine CT Cervical Spine without IV contrast. Multiplanar reformats. Dose reduction techniques were used.    FINDINGS:   HEAD CT:   INTRACRANIAL CONTENTS: No intracranial hemorrhage, extraaxial collection, or  mass effect.  No CT evidence of acute infarct. Mild presumed chronic small vessel ischemic changes. Bone compared to CT from 2013, mild generalized volume loss. No acute   hydrocephalus.     VISUALIZED ORBITS/SINUSES/MASTOIDS: No acute intraorbital abnormality. No significant paranasal sinus mucosal disease. No mastoid effusion.    BONES/SOFT TISSUES: No acute abnormality.    CERVICAL SPINE CT:   VERTEBRA: Straightening of the normal cervical lordosis. Normal vertebral body heights and alignment. No fracture or posttraumatic subluxation.     CANAL/FORAMINA: Posterior disc osteophyte complex at C5-C6 with mild spinal canal stenosis. Multilevel mild to moderate foraminal narrowing, most notably at C5-C6 on the left, similar to exam from 2018.    PARASPINAL: Unremarkable prevertebral soft tissues. Visualized lung fields are clear.      Impression    IMPRESSION:  HEAD CT:  1.  No acute intracranial finding.    CERVICAL SPINE CT:  1.  No CT evidence for acute fracture or post traumatic subluxation.   Cervical spine CT w/o contrast    Narrative    EXAM: CT CERVICAL SPINE W/O CONTRAST, CT HEAD W/O CONTRAST  LOCATION: [facility]  DATE/TIME: 9/4/2022 1:09 PM    INDICATION: head injury after head injury by horse  COMPARISON: CT dated 7/25/2013 and 5/8/2018.  TECHNIQUE:   1) Routine CT Head without IV contrast. Multiplanar reformats. Dose reduction techniques were used.  2) Routine CT Cervical Spine without IV contrast. Multiplanar reformats. Dose reduction techniques were used.    FINDINGS:   HEAD CT:   INTRACRANIAL CONTENTS: No intracranial hemorrhage, extraaxial collection, or mass effect.  No CT evidence of acute infarct. Mild presumed chronic small vessel ischemic changes. Bone compared to CT from 2013, mild generalized volume loss. No acute   hydrocephalus.     VISUALIZED ORBITS/SINUSES/MASTOIDS: No acute intraorbital abnormality. No significant paranasal sinus mucosal disease. No mastoid  effusion.    BONES/SOFT TISSUES: No acute abnormality.    CERVICAL SPINE CT:   VERTEBRA: Straightening of the normal cervical lordosis. Normal vertebral body heights and alignment. No fracture or posttraumatic subluxation.     CANAL/FORAMINA: Posterior disc osteophyte complex at C5-C6 with mild spinal canal stenosis. Multilevel mild to moderate foraminal narrowing, most notably at C5-C6 on the left, similar to exam from 2018.    PARASPINAL: Unremarkable prevertebral soft tissues. Visualized lung fields are clear.      Impression    IMPRESSION:  HEAD CT:  1.  No acute intracranial finding.    CERVICAL SPINE CT:  1.  No CT evidence for acute fracture or post traumatic subluxation.       Medications   0.9% sodium chloride BOLUS (0 mLs Intravenous Stopped 9/4/22 1332)       Assessments & Plan (with Medical Decision Making)  57-year-old female with a head injury related to a horse as above.  CT scan of the head and cervical spine were normal.  No evidence of fracture or bleeding.  C-collar was removed.  Patient's pain had improved with IV Dilaudid.  I discussed options with the patient for home pain management and follow-up.  She wanted to try tramadol along with Flexeril.  A prescription was sent to the pharmacy.  Return to ER precautions and fall precautions discussed.  Also recommended rest particularly brain rest.  All questions answered prior to discharge.     I have reviewed the nursing notes.    I have reviewed the findings, diagnosis, plan and need for follow up with the patient.      Discharge Medication List as of 9/4/2022  2:26 PM      START taking these medications    Details   cyclobenzaprine (FLEXERIL) 10 MG tablet Take 1 tablet (10 mg) by mouth 3 times daily as needed for muscle spasms, Disp-20 tablet, R-0, E-Prescribe      traMADol (ULTRAM) 50 MG tablet Take 1 tablet (50 mg) by mouth every 6 hours as needed for severe pain, Disp-15 tablet, R-0, E-Prescribe             Final diagnoses:   Injury of head,  initial encounter   Neck pain       9/4/2022   [facility] EMERGENCY DEPT     Brenton Epps MD  09/04/22 6525

## 2022-09-04 NOTE — DISCHARGE INSTRUCTIONS
Get plenty of rest.  Try to take it easy over the next few days while your head is still hurting.  Get plenty of fluid intake.  Take the tramadol and Flexeril as needed for headache and muscle spasms.  I hope you improve quickly.  If not please follow-up with your doctor.  Return to the ER if pain is severe or new symptoms. It was a pleasure to meet you.

## 2022-09-04 NOTE — ED TRIAGE NOTES
Presents with head pain. States she was head butted by her horse last Thursday. She developed a headache later that day. That head pain has progressed to it's worst ever. Denies LOC      Triage Assessment     Row Name 09/04/22 1059       Triage Assessment (Adult)    Airway WDL WDL       Respiratory WDL    Respiratory WDL WDL       Skin Circulation/Temperature WDL    Skin Circulation/Temperature WDL WDL       Cardiac WDL    Cardiac WDL WDL       Peripheral/Neurovascular WDL    Peripheral Neurovascular WDL WDL       Cognitive/Neuro/Behavioral WDL    Cognitive/Neuro/Behavioral WDL mood/behavior    Mood/Behavior restless

## 2022-12-18 DIAGNOSIS — E78.5 HYPERLIPIDEMIA LDL GOAL <130: ICD-10-CM

## 2022-12-20 RX ORDER — ROSUVASTATIN CALCIUM 10 MG/1
TABLET, COATED ORAL
Qty: 90 TABLET | Refills: 1 | Status: SHIPPED | OUTPATIENT
Start: 2022-12-20 | End: 2023-06-20

## 2023-05-03 ENCOUNTER — NURSE TRIAGE (OUTPATIENT)
Dept: FAMILY MEDICINE | Facility: CLINIC | Age: 58
End: 2023-05-03
Payer: COMMERCIAL

## 2023-05-03 NOTE — TELEPHONE ENCOUNTER
Patient's  is calling and then is giving patient the phone to answer triage questions.  Patient is having difficulty talking and breathing due to continuous cough during this triage conversation.    Patient stated approximately 3 weeks ago, she had injured her hand with a sanjuana instrument and it bled heavily.  She stated the next day and coming up days she developed a sore on her face that will not heal, and a cough that has increasingly been getting worse with difficulty breathing. Patient stated it seems like there are symptoms that are present on the same side as her hand injury that have been occurring and she feels like there may be an infection, blood or bacteria, that has spread from the initial injury to other parts of her body.    Per protocol, patient to be seen in the ED at this time.  Advised patient to seek emergency care at this time.  Patient stated understanding and will go to the ED at this time.      Reason for Disposition    Patient sounds very sick or weak to the triager    MODERATE difficulty breathing (e.g., speaks in phrases, SOB even at rest, pulse 100-120) and still present when not coughing    Additional Information    Negative: Bluish (or gray) lips or face    Negative: SEVERE difficulty breathing (e.g., struggling for each breath, speaks in single words)    Negative: Rapid onset of cough and has hives    Negative: Coughing started suddenly after medicine, an allergic food or bee sting    Negative: Difficulty breathing after exposure to flames, smoke, or fumes    Negative: Sounds like a life-threatening emergency to the triager    Negative: Previous asthma attacks and this feels like asthma attack    Negative: Dry cough (non-productive; no sputum or minimal clear sputum) and within 14 days of COVID-19 Exposure    Negative: Chest pain present when not coughing    Negative: Passed out (i.e., fainted, collapsed and was not responding)    Answer Assessment - Initial Assessment  "Questions  1. ONSET: \"When did the cough begin?\"       Approximately 3 weeks ago    2. SEVERITY: \"How bad is the cough today?\"       Moderate    3. SPUTUM: \"Describe the color of your sputum\" (none, dry cough; clear, white, yellow, green)      Dry for 2 weeks ( 3 weeks ), then sputum for a a week now dry again    4. HEMOPTYSIS: \"Are you coughing up any blood?\" If so ask: \"How much?\" (flecks, streaks, tablespoons, etc.)      No    5. DIFFICULTY BREATHING: \"Are you having difficulty breathing?\" If Yes, ask: \"How bad is it?\" (e.g., mild, moderate, severe)     - MILD: No SOB at rest, mild SOB with walking, speaks normally in sentences, can lie down, no retractions, pulse < 100.     - MODERATE: SOB at rest, SOB with minimal exertion and prefers to sit, cannot lie down flat, speaks in phrases, mild retractions, audible wheezing, pulse 100-120.     - SEVERE: Very SOB at rest, speaks in single words, struggling to breathe, sitting hunched forward, retractions, pulse > 120       Moderate due to cough    6. FEVER: \"Do you have a fever?\" If Yes, ask: \"What is your temperature, how was it measured, and when did it start?\"      No    7. CARDIAC HISTORY: \"Do you have any history of heart disease?\" (e.g., heart attack, congestive heart failure)       No    8. LUNG HISTORY: \"Do you have any history of lung disease?\"  (e.g., pulmonary embolus, asthma, emphysema)      No    9. PE RISK FACTORS: \"Do you have a history of blood clots?\" (or: recent major surgery, recent prolonged travel, bedridden)      No    10. OTHER SYMPTOMS: \"Do you have any other symptoms?\" (e.g., runny nose, wheezing, chest pain)        No    11. PREGNANCY: \"Is there any chance you are pregnant?\" \"When was your last menstrual period?\"        No    12. TRAVEL: \"Have you traveled out of the country in the last month?\" (e.g., travel history, exposures)        No    Protocols used: COUGH-A-[place]    Rama Mendoza RN    "

## 2023-05-04 ENCOUNTER — HOSPITAL ENCOUNTER (EMERGENCY)
Facility: CLINIC | Age: 58
Discharge: HOME | End: 2023-05-04
Attending: FAMILY MEDICINE | Admitting: FAMILY MEDICINE
Payer: COMMERCIAL

## 2023-05-04 VITALS
BODY MASS INDEX: 30.8 KG/M2 | OXYGEN SATURATION: 100 % | TEMPERATURE: 98.7 F | RESPIRATION RATE: 20 BRPM | DIASTOLIC BLOOD PRESSURE: 96 MMHG | WEIGHT: 157.7 LBS | SYSTOLIC BLOOD PRESSURE: 145 MMHG | HEART RATE: 94 BPM

## 2023-05-04 DIAGNOSIS — A49.02 MRSA INFECTION: ICD-10-CM

## 2023-05-04 DIAGNOSIS — J20.9 ACUTE BRONCHITIS, UNSPECIFIED ORGANISM: ICD-10-CM

## 2023-05-04 PROCEDURE — 99284 EMERGENCY DEPT VISIT MOD MDM: CPT

## 2023-05-04 RX ORDER — ALBUTEROL SULFATE 90 UG/1
2 AEROSOL, METERED RESPIRATORY (INHALATION) EVERY 6 HOURS PRN
Qty: 18 G | Refills: 0 | Status: SHIPPED | OUTPATIENT
Start: 2023-05-04 | End: 2023-10-12

## 2023-05-04 RX ORDER — SULFAMETHOXAZOLE/TRIMETHOPRIM 800-160 MG
1 TABLET ORAL 2 TIMES DAILY
Qty: 20 TABLET | Refills: 0 | Status: SHIPPED | OUTPATIENT
Start: 2023-05-04 | End: 2023-05-14

## 2023-05-04 NOTE — ED PROVIDER NOTES
History     Chief Complaint   Patient presents with     Cough     Wound Check     HPI  Elisa Al is a 57 year old female who presents with concerns of a sore on the left side of her face has been there for a year.  She states that occasionally it will swell and drain, she picks at it a lot.  She is coming in today because she has had a cough for the past month it was getting better but now is getting worse again.  Denies any fevers or chills.  Patient is a smoker but has never been formally diagnosed with COPD.    Allergies:  Allergies   Allergen Reactions     Aspirin Swelling     Erythromycin Ethylsuccinate GI Disturbance     gi upset     Ibuprofen Swelling     Naproxen      Nsaids Swelling     Zelnorm [Tegaserod Maleate]        Problem List:    Patient Active Problem List    Diagnosis Date Noted     S/P TKR (total knee replacement) using cement, left 02/09/2022     Priority: Medium     Hyperlipidemia LDL goal <130 02/04/2022     Priority: Medium     Tobacco abuse 02/04/2022     Priority: Medium     Peroneal tendonitis, left 04/13/2020     Priority: Medium     Primary osteoarthritis of left knee 09/04/2019     Priority: Medium     Hypothyroidism, unspecified type 02/21/2018     Priority: Medium     Major depression in complete remission (H) 02/21/2018     Priority: Medium     CARDIOVASCULAR SCREENING; LDL GOAL LESS THAN 160 10/31/2010     Priority: Medium     Pain in joint, shoulder region 12/05/2006     Priority: Medium        Past Medical History:    Past Medical History:   Diagnosis Date     Anxiety      Benign neoplasm of other specified sites of skin 4/6/2006     Degenerative disk disease 12/18/12     Depressive disorder      Gout, unspecified      Other and unspecified ovarian cyst      Unspecified hypothyroidism years     Unspecified intestinal obstruction 02/02/2005       Past Surgical History:    Past Surgical History:   Procedure Laterality Date     ARTHROPLASTY KNEE Left 2/9/2022    Procedure:  left total knee replacement;  Surgeon: Sahil Cottrell DO;  [facility]     CARPAL TUNNEL RELEASE RT/LT  2005    right wrist     COLONOSCOPY N/A 8/16/2021    Procedure: COLONOSCOPY, WITH POLYPECTOMY;  Surgeon: Celso Shipley MD;  [facility]     EXPLORE SPINE, REMOVE HARDWARE, COMBINED  4/7/2014    Procedure: COMBINED EXPLORE SPINE, REMOVE HARDWARE;  Hardware Removal Lumbar 4-Sacral 1, Exploration of Fusion;  Surgeon: Brenton Ashford MD;  Location: [facility]     FUSION LUMBAR ANTERIOR THREE+ LEVELS       HC EXCISION TENDON SHEATH LESION, HAND/FINGR  04/17/2006    Right middle finger flexor sheath MP joint.     HC EXCISION TENDON SHEATH LESION, HAND/FINGR  08/07/06    Right middle finger     HC LAPAROSCOPY, SURGICAL; CHOLECYSTECTOMY  4/12/2004    Cholecystectomy, Laparoscopic     HC REMOVAL OF OVARY/TUBE(S)      left ovary removed secondary to cyst     HC REMOVAL OF TONSILS,12+ Y/O      Tonsils 12+y.o.     HC REVISE MEDIAN N/CARPAL TUNNEL SURG  8/22/2005    Left     ZZC LAP,ABD/PERIT/OMENTUM,UNLIST  04/12/10    pelvic adhesion     ZZC TOTAL ABDOM HYSTERECTOMY  09/08/2004    TAHRSO, Cystoscopy.     ZZ COLONOSCOPY W BIOPSY  04/26/10     ZZHC COLONOSCOPY W/WO BRUSH/WASH  5/25/2005     [facility]  [facility]       Family History:    Family History   Problem Relation Age of Onset     Blood Disease Mother         leukemia-passed 2008     Cardiovascular Father         triple bypass     Respiratory Father         Black Lung     Alzheimer Disease Father      Cancer Maternal Grandmother         ovarian/brain ca       Social History:  Marital Status:   [2]  Social History     Tobacco Use     Smoking status: Every Day     Packs/day: 0.25     Years: 23.00     Pack years: 5.75     Types: Cigarettes     Smokeless tobacco: Former   Substance Use Topics     Alcohol use: Yes     Alcohol/week: 0.0 standard drinks of alcohol     Comment: nightly     Drug use: No         Medications:    albuterol (VENTOLIN HFA) 108 (90 Base) MCG/ACT inhaler  sulfamethoxazole-trimethoprim (BACTRIM DS) 800-160 MG tablet  acetaminophen (TYLENOL) 325 MG tablet  levothyroxine (SYNTHROID/LEVOTHROID) 100 MCG tablet  pseudoePHEDrine (SUDAFED) 120 MG 12 hr tablet  rosuvastatin (CRESTOR) 10 MG tablet  sertraline (ZOLOFT) 100 MG tablet          Review of Systems   All other systems reviewed and are negative.      Physical Exam   BP: (!) 167/104  Pulse: 99  Temp: 98.7  F (37.1  C)  Resp: 20  Weight: 71.5 kg (157 lb 11.2 oz)  SpO2: 100 %      Physical Exam  Vitals and nursing note reviewed.   Constitutional:       General: She is not in acute distress.     Appearance: She is well-developed. She is not diaphoretic.   Eyes:      Conjunctiva/sclera: Conjunctivae normal.   Cardiovascular:      Rate and Rhythm: Normal rate and regular rhythm.      Heart sounds: Normal heart sounds. No murmur heard.     No friction rub. No gallop.   Pulmonary:      Effort: Pulmonary effort is normal. No respiratory distress.      Breath sounds: Wheezing and rhonchi present. No rales.   Chest:      Chest wall: No tenderness.   Abdominal:      General: Bowel sounds are normal. There is no distension.      Palpations: Abdomen is soft. There is no mass.      Tenderness: There is no abdominal tenderness. There is no guarding.   Musculoskeletal:         General: No tenderness. Normal range of motion.      Cervical back: Normal range of motion and neck supple.   Skin:     General: Skin is warm and dry.      Findings: Rash present.      Comments: There is a small red flat plaque on the left side of the cheek.  No drainage.  No fluctuance noted.  Some surrounding erythema noted.   Neurological:      Mental Status: She is alert and oriented to person, place, and time.   Psychiatric:         Judgment: Judgment normal.         ED Course                 Procedures      No results found for this or any previous visit (from the past 24  hour(s)).    Medications - No data to display     I am concerned that this rash could be a possible MRSA infection as she also has some other lesions that look somewhat similar developing other sides of her face.  She does pick at it a lot so we discussed that she needs to stop doing this.  Her vitals are stable and lung exam was mostly unremarkable except for some wheezing.  I think patient likely has acute bronchitis.  We will treat with an inhaler and will put on a course of Bactrim which will also cover the possible MRSA infection.  Patient was told to follow-up with her doctor if there is no improvement in 2 weeks, if this sore on the face continues to swell and drain, may need to be referred to dermatology for an excision.    Assessments & Plan (with Medical Decision Making)  MRSA, acute bronchitis     I have reviewed the nursing notes.    I have reviewed the findings, diagnosis, plan and need for follow up with the patient.      New Prescriptions    ALBUTEROL (VENTOLIN HFA) 108 (90 BASE) MCG/ACT INHALER    Inhale 2 puffs into the lungs every 6 hours as needed for shortness of breath, wheezing or cough    SULFAMETHOXAZOLE-TRIMETHOPRIM (BACTRIM DS) 800-160 MG TABLET    Take 1 tablet by mouth 2 times daily for 10 days       Final diagnoses:   Acute bronchitis, unspecified organism   MRSA infection       5/4/2023   [facility] EMERGENCY DEPT     Derrick Greenwood MD  05/04/23 0800

## 2023-05-04 NOTE — ED TRIAGE NOTES
She has had a wound on her L cheek since December that she keeps picking at and it is spreading. She also has a productive cough for the past month and is having difficulty getting into a Dr.

## 2023-06-19 DIAGNOSIS — E03.9 HYPOTHYROIDISM, UNSPECIFIED TYPE: ICD-10-CM

## 2023-06-19 DIAGNOSIS — E78.5 HYPERLIPIDEMIA LDL GOAL <130: ICD-10-CM

## 2023-06-19 NOTE — LETTER
June 22, 2023      Elisa Al  [address] [facility] [address]        Dear Elisa,     We are concerned about your health care.  We recently provided you with a medication refill.  Many medications require routine follow-up with your Doctor.      At this time we ask that: You schedule a routine office visit with your physician to follow your medication Call the clinic at [phone] Option 1 to schedule.     Your prescription:  Medication is being filled for 1 time humberto refill      Thank you,     Care Team  Kylie Coffman NP

## 2023-06-20 RX ORDER — ROSUVASTATIN CALCIUM 10 MG/1
TABLET, COATED ORAL
Qty: 90 TABLET | Refills: 0 | Status: SHIPPED | OUTPATIENT
Start: 2023-06-20 | End: 2023-09-15

## 2023-06-20 RX ORDER — LEVOTHYROXINE SODIUM 100 UG/1
TABLET ORAL
Qty: 90 TABLET | Refills: 0 | Status: SHIPPED | OUTPATIENT
Start: 2023-06-20 | End: 2023-09-15

## 2023-06-20 NOTE — TELEPHONE ENCOUNTER
Pending Prescriptions:                       Disp   Refills    rosuvastatin (CRESTOR) 10 MG tablet [Phar*90 tab*0            Sig: TAKE ONE TABLET BY MOUTH ONCE DAILY    levothyroxine (SYNTHROID/LEVOTHROID) 100 *90 tab*0            Sig: TAKE ONE TABLET BY MOUTH ONCE DAILY    Medication is being filled for 1 time humberto refill only due to:  Patient is due for yearly visit    Please call and help schedule.  Thank you!    Kelsey Peoples RN on 6/20/2023 at 11:10 AM

## 2023-07-14 ENCOUNTER — TELEPHONE (OUTPATIENT)
Dept: FAMILY MEDICINE | Facility: CLINIC | Age: 58
End: 2023-07-14
Payer: COMMERCIAL

## 2023-07-14 DIAGNOSIS — F41.8 DEPRESSION WITH ANXIETY: ICD-10-CM

## 2023-07-14 NOTE — LETTER
[address] [address] [phone]        July 26, 2023    [place]  [address] [facility] [address]          Dear Elisa,    We are concerned about your health care.  We recently provided you with a medication refill.  Many medications require routine follow-up with your Doctor.      At this time we ask that: You schedule an appointment for your annual physical. Call the clinic at [phone] Option 1 to schedule.     Your prescription:  Has been filled. Please schedule a follow up visit for first available appointment. Courtesy refills will be given if needed until your scheduled appointment.       Thank you,     Sincerely,    Care Team

## 2023-07-17 RX ORDER — SERTRALINE HYDROCHLORIDE 100 MG/1
TABLET, FILM COATED ORAL
Qty: 135 TABLET | Refills: 3 | Status: SHIPPED | OUTPATIENT
Start: 2023-07-17 | End: 2023-10-12

## 2023-07-17 NOTE — TELEPHONE ENCOUNTER
"Routing refill request to provider for review/approval because:  Future Appointments 7/17/2023 - 1/13/2024      None          Sending to scheduling for yearly office visit due    Requested Prescriptions   Pending Prescriptions Disp Refills    sertraline (ZOLOFT) 100 MG tablet [Pharmacy Med Name: SERTRALINE HCL 100MG TABS] 135 tablet 3     Sig: TKAE 1 AND 1/2 TABLETS BY MOUTH ONCE DAILY       SSRIs Protocol Failed - 7/14/2023 12:58 PM        Failed - PHQ-9 score less than 5 in past 6 months     Please review last PHQ-9 score.           Failed - Recent (6 mo) or future (30 days) visit within the authorizing provider's specialty     Patient had office visit in the last 6 months or has a visit in the next 30 days with authorizing provider or within the authorizing provider's specialty.  See \"Patient Info\" tab in inbasket, or \"Choose Columns\" in Meds & Orders section of the refill encounter.         "

## 2023-09-14 DIAGNOSIS — E78.5 HYPERLIPIDEMIA LDL GOAL <130: ICD-10-CM

## 2023-09-14 DIAGNOSIS — E03.9 HYPOTHYROIDISM, UNSPECIFIED TYPE: ICD-10-CM

## 2023-09-15 RX ORDER — LEVOTHYROXINE SODIUM 100 UG/1
TABLET ORAL
Qty: 90 TABLET | Refills: 0 | Status: SHIPPED | OUTPATIENT
Start: 2023-09-15 | End: 2023-10-12

## 2023-09-15 RX ORDER — ROSUVASTATIN CALCIUM 10 MG/1
TABLET, COATED ORAL
Qty: 90 TABLET | Refills: 0 | Status: SHIPPED | OUTPATIENT
Start: 2023-09-15 | End: 2023-10-12

## 2023-10-12 ENCOUNTER — OFFICE VISIT (OUTPATIENT)
Dept: FAMILY MEDICINE | Facility: CLINIC | Age: 58
End: 2023-10-12
Payer: COMMERCIAL

## 2023-10-12 VITALS
OXYGEN SATURATION: 98 % | RESPIRATION RATE: 16 BRPM | TEMPERATURE: 98.4 F | HEIGHT: 60 IN | WEIGHT: 155.6 LBS | BODY MASS INDEX: 30.55 KG/M2 | HEART RATE: 103 BPM | SYSTOLIC BLOOD PRESSURE: 144 MMHG | DIASTOLIC BLOOD PRESSURE: 86 MMHG

## 2023-10-12 DIAGNOSIS — Z12.31 VISIT FOR SCREENING MAMMOGRAM: ICD-10-CM

## 2023-10-12 DIAGNOSIS — F33.0 MILD EPISODE OF RECURRENT MAJOR DEPRESSIVE DISORDER (H): ICD-10-CM

## 2023-10-12 DIAGNOSIS — E78.5 HYPERLIPIDEMIA LDL GOAL <130: ICD-10-CM

## 2023-10-12 DIAGNOSIS — M89.8X8 MASS OF STERNUM: ICD-10-CM

## 2023-10-12 DIAGNOSIS — E03.9 HYPOTHYROIDISM, UNSPECIFIED TYPE: ICD-10-CM

## 2023-10-12 DIAGNOSIS — D64.9 ANEMIA, UNSPECIFIED TYPE: ICD-10-CM

## 2023-10-12 DIAGNOSIS — Z23 NEEDS FLU SHOT: ICD-10-CM

## 2023-10-12 DIAGNOSIS — Z13.220 LIPID SCREENING: ICD-10-CM

## 2023-10-12 DIAGNOSIS — R21 RASH: ICD-10-CM

## 2023-10-12 DIAGNOSIS — Z23 NEED FOR PNEUMOCOCCAL VACCINATION: ICD-10-CM

## 2023-10-12 DIAGNOSIS — Z00.00 ROUTINE GENERAL MEDICAL EXAMINATION AT A HEALTH CARE FACILITY: Primary | ICD-10-CM

## 2023-10-12 DIAGNOSIS — R03.0 ELEVATED BP WITHOUT DIAGNOSIS OF HYPERTENSION: ICD-10-CM

## 2023-10-12 LAB
ANION GAP SERPL CALCULATED.3IONS-SCNC: 14 MMOL/L (ref 7–15)
BASO+EOS+MONOS # BLD AUTO: NORMAL 10*3/UL
BASO+EOS+MONOS NFR BLD AUTO: NORMAL %
BASOPHILS # BLD AUTO: 0.1 10E3/UL (ref 0–0.2)
BASOPHILS NFR BLD AUTO: 1 %
BUN SERPL-MCNC: 10 MG/DL (ref 6–20)
CALCIUM SERPL-MCNC: 9.4 MG/DL (ref 8.6–10)
CHLORIDE SERPL-SCNC: 101 MMOL/L (ref 98–107)
CHOLEST SERPL-MCNC: 223 MG/DL
CREAT SERPL-MCNC: 0.61 MG/DL (ref 0.51–0.95)
DEPRECATED HCO3 PLAS-SCNC: 24 MMOL/L (ref 22–29)
EGFRCR SERPLBLD CKD-EPI 2021: >90 ML/MIN/1.73M2
EOSINOPHIL # BLD AUTO: 0.1 10E3/UL (ref 0–0.7)
EOSINOPHIL NFR BLD AUTO: 1 %
ERYTHROCYTE [DISTWIDTH] IN BLOOD BY AUTOMATED COUNT: 12.2 % (ref 10–15)
FERRITIN SERPL-MCNC: 177 NG/ML (ref 11–328)
GLUCOSE SERPL-MCNC: 104 MG/DL (ref 70–99)
HCT VFR BLD AUTO: 44.6 % (ref 35–47)
HDLC SERPL-MCNC: 79 MG/DL
HGB BLD-MCNC: 15 G/DL (ref 11.7–15.7)
IMM GRANULOCYTES # BLD: 0.1 10E3/UL
IMM GRANULOCYTES NFR BLD: 1 %
LDLC SERPL CALC-MCNC: 68 MG/DL
LYMPHOCYTES # BLD AUTO: 1.9 10E3/UL (ref 0.8–5.3)
LYMPHOCYTES NFR BLD AUTO: 25 %
MCH RBC QN AUTO: 30.6 PG (ref 26.5–33)
MCHC RBC AUTO-ENTMCNC: 33.6 G/DL (ref 31.5–36.5)
MCV RBC AUTO: 91 FL (ref 78–100)
MONOCYTES # BLD AUTO: 0.5 10E3/UL (ref 0–1.3)
MONOCYTES NFR BLD AUTO: 6 %
NEUTROPHILS # BLD AUTO: 5.1 10E3/UL (ref 1.6–8.3)
NEUTROPHILS NFR BLD AUTO: 66 %
NONHDLC SERPL-MCNC: 144 MG/DL
NRBC # BLD AUTO: 0 10E3/UL
NRBC BLD AUTO-RTO: 0 /100
PLATELET # BLD AUTO: 214 10E3/UL (ref 150–450)
POTASSIUM SERPL-SCNC: 4.3 MMOL/L (ref 3.4–5.3)
RBC # BLD AUTO: 4.9 10E6/UL (ref 3.8–5.2)
SODIUM SERPL-SCNC: 139 MMOL/L (ref 135–145)
TRIGL SERPL-MCNC: 378 MG/DL
TSH SERPL DL<=0.005 MIU/L-ACNC: 2.5 UIU/ML (ref 0.3–4.2)
WBC # BLD AUTO: 7.7 10E3/UL (ref 4–11)

## 2023-10-12 PROCEDURE — 90682 RIV4 VACC RECOMBINANT DNA IM: CPT | Performed by: NURSE PRACTITIONER

## 2023-10-12 PROCEDURE — 82728 ASSAY OF FERRITIN: CPT | Performed by: NURSE PRACTITIONER

## 2023-10-12 PROCEDURE — 85025 COMPLETE CBC W/AUTO DIFF WBC: CPT | Performed by: NURSE PRACTITIONER

## 2023-10-12 PROCEDURE — 90677 PCV20 VACCINE IM: CPT | Performed by: NURSE PRACTITIONER

## 2023-10-12 PROCEDURE — 90472 IMMUNIZATION ADMIN EACH ADD: CPT | Performed by: NURSE PRACTITIONER

## 2023-10-12 PROCEDURE — 84443 ASSAY THYROID STIM HORMONE: CPT | Performed by: NURSE PRACTITIONER

## 2023-10-12 PROCEDURE — 80048 BASIC METABOLIC PNL TOTAL CA: CPT | Performed by: NURSE PRACTITIONER

## 2023-10-12 PROCEDURE — 99396 PREV VISIT EST AGE 40-64: CPT | Mod: 25 | Performed by: NURSE PRACTITIONER

## 2023-10-12 PROCEDURE — 96127 BRIEF EMOTIONAL/BEHAV ASSMT: CPT | Performed by: NURSE PRACTITIONER

## 2023-10-12 PROCEDURE — 36415 COLL VENOUS BLD VENIPUNCTURE: CPT | Performed by: NURSE PRACTITIONER

## 2023-10-12 PROCEDURE — 80061 LIPID PANEL: CPT | Performed by: NURSE PRACTITIONER

## 2023-10-12 PROCEDURE — 90471 IMMUNIZATION ADMIN: CPT | Performed by: NURSE PRACTITIONER

## 2023-10-12 PROCEDURE — 99214 OFFICE O/P EST MOD 30 MIN: CPT | Mod: 25 | Performed by: NURSE PRACTITIONER

## 2023-10-12 RX ORDER — SERTRALINE HYDROCHLORIDE 100 MG/1
TABLET, FILM COATED ORAL
Qty: 135 TABLET | Refills: 3 | Status: SHIPPED | OUTPATIENT
Start: 2023-10-12

## 2023-10-12 RX ORDER — MUPIROCIN 20 MG/G
OINTMENT TOPICAL 3 TIMES DAILY
Qty: 30 G | Refills: 0 | Status: SHIPPED | OUTPATIENT
Start: 2023-10-12

## 2023-10-12 RX ORDER — BUPROPION HYDROCHLORIDE 150 MG/1
150 TABLET ORAL EVERY MORNING
Qty: 90 TABLET | Refills: 0 | Status: SHIPPED | OUTPATIENT
Start: 2023-10-12 | End: 2024-01-09

## 2023-10-12 RX ORDER — ROSUVASTATIN CALCIUM 10 MG/1
10 TABLET, COATED ORAL DAILY
Qty: 90 TABLET | Refills: 3 | Status: SHIPPED | OUTPATIENT
Start: 2023-10-12 | End: 2023-10-16

## 2023-10-12 RX ORDER — LEVOTHYROXINE SODIUM 100 UG/1
100 TABLET ORAL DAILY
Refills: 3 | Status: SHIPPED | DISCHARGE
Start: 2023-10-12 | End: 2023-12-15

## 2023-10-12 ASSESSMENT — ANXIETY QUESTIONNAIRES
7. FEELING AFRAID AS IF SOMETHING AWFUL MIGHT HAPPEN: NEARLY EVERY DAY
2. NOT BEING ABLE TO STOP OR CONTROL WORRYING: NEARLY EVERY DAY
IF YOU CHECKED OFF ANY PROBLEMS ON THIS QUESTIONNAIRE, HOW DIFFICULT HAVE THESE PROBLEMS MADE IT FOR YOU TO DO YOUR WORK, TAKE CARE OF THINGS AT HOME, OR GET ALONG WITH OTHER PEOPLE: SOMEWHAT DIFFICULT
3. WORRYING TOO MUCH ABOUT DIFFERENT THINGS: NEARLY EVERY DAY
1. FEELING NERVOUS, ANXIOUS, OR ON EDGE: NEARLY EVERY DAY
5. BEING SO RESTLESS THAT IT IS HARD TO SIT STILL: NOT AT ALL
GAD7 TOTAL SCORE: 15
GAD7 TOTAL SCORE: 15
4. TROUBLE RELAXING: NOT AT ALL
6. BECOMING EASILY ANNOYED OR IRRITABLE: NEARLY EVERY DAY

## 2023-10-12 ASSESSMENT — PATIENT HEALTH QUESTIONNAIRE - PHQ9
SUM OF ALL RESPONSES TO PHQ QUESTIONS 1-9: 17
SUM OF ALL RESPONSES TO PHQ QUESTIONS 1-9: 17
10. IF YOU CHECKED OFF ANY PROBLEMS, HOW DIFFICULT HAVE THESE PROBLEMS MADE IT FOR YOU TO DO YOUR WORK, TAKE CARE OF THINGS AT HOME, OR GET ALONG WITH OTHER PEOPLE: SOMEWHAT DIFFICULT

## 2023-10-12 ASSESSMENT — PAIN SCALES - GENERAL: PAINLEVEL: NO PAIN (0)

## 2023-10-12 NOTE — PROGRESS NOTES
[address] [address]  Phone: [phone]  Fax: [phone]  Primary Provider: Alexa Gusman  Pre-op Performing Provider: ALEXA GUSMAN    {Provider  Link to PREOP SmartSet  Use this to apply standard patient instructions to AVS; includes medication directions, common orders, guidelines for anemia, warfarin, additional testing   :299994}  PREOPERATIVE EVALUATION:  Today's date: 10/12/2023    Elisa is a 58 year old female who presents for a preoperative evaluation.      10/12/2023     8:12 AM   Additional Questions   Roomed by Ruth       Surgical Information:  Surgery/Procedure: Breat Reduction  Surgery Location: [facility]   Surgeon: Unknown  Surgery Date: Oct 25th  Time of Surgery: 9:30ish  Where patient plans to recover: At home with family  Fax number for surgical facility: Note does not need to be faxed, will be available electronically in Epic.    {2021 Provider Charting Preference for Preop :177772}    Subjective   {ALERT  Recent PHQ-9 score indicates suicidal ideations :881585}{Provider Documentation  Link to C-SSRS ([facility]) Flowsheet :395175}    HPI related to upcoming procedure: ***    {Click here to pull in Questionnaire Data after Qnr completed :603785}  Health Care Directive:  Patient does not have a Health Care Directive or Living Will: {ADVANCE_DIRECTIVE_STATUS:634163}    Preoperative Review of :  {Mnpmpreport:841832}  {Review MNPMP for all patients per ICSI MNPMP Profile:481189}    {Chronic problem details (Optional) :802679}    Review of Systems  {ROS Preop Choices:327753}    Patient Active Problem List    Diagnosis Date Noted    S/P TKR (total knee replacement) using cement, left 02/09/2022     Priority: Medium    Hyperlipidemia LDL goal <130 02/04/2022     Priority: Medium    Tobacco abuse 02/04/2022     Priority: Medium    Peroneal tendonitis, left 04/13/2020     Priority: Medium    Primary  osteoarthritis of left knee 09/04/2019     Priority: Medium    Hypothyroidism, unspecified type 02/21/2018     Priority: Medium    Major depression in complete remission (H) 02/21/2018     Priority: Medium    CARDIOVASCULAR SCREENING; LDL GOAL LESS THAN 160 10/31/2010     Priority: Medium    Pain in joint, shoulder region 12/05/2006     Priority: Medium      Past Medical History:   Diagnosis Date    Anxiety     Benign neoplasm of other specified sites of skin 4/6/2006    Finger - right third finger proximal palmar surface    Degenerative disk disease 12/18/12    anterior exposure of L4-L5 and L5-S1 disk spaces for fusion    Depressive disorder     Gout, unspecified     Other and unspecified ovarian cyst     Unspecified hypothyroidism years    Unspecified intestinal obstruction 02/02/2005    Partial bowel obstruction, Obstipation.     Past Surgical History:   Procedure Laterality Date    ARTHROPLASTY KNEE Left 2/9/2022    Procedure: left total knee replacement;  Surgeon: Sahil Cottrell DO;  [facility]    CARPAL TUNNEL RELEASE RT/LT  2005    right wrist    COLONOSCOPY N/A 8/16/2021    Procedure: COLONOSCOPY, WITH POLYPECTOMY;  Surgeon: Celso Shipley MD;  [facility]    EXPLORE SPINE, REMOVE HARDWARE, COMBINED  4/7/2014    Procedure: COMBINED EXPLORE SPINE, REMOVE HARDWARE;  Hardware Removal Lumbar 4-Sacral 1, Exploration of Fusion;  Surgeon: Brenton Ashford MD;  Location[facility]    FUSION LUMBAR ANTERIOR THREE+ LEVELS      HC EXCISION TENDON SHEATH LESION, HAND/FINGR  04/17/2006    Right middle finger flexor sheath MP joint.    HC EXCISION TENDON SHEATH LESION, HAND/FINGR  08/07/06    Right middle finger    HC LAPAROSCOPY, SURGICAL; CHOLECYSTECTOMY  4/12/2004    Cholecystectomy, Laparoscopic    HC REMOVAL OF OVARY/TUBE(S)      left ovary removed secondary to cyst    HC REMOVAL OF TONSILS,12+ Y/O      Tonsils 12+y.o.    HC REVISE MEDIAN N/CARPAL TUNNEL SURG  8/22/2005    Left    ZZC  LAP,ABD/PERIT/OMENTUM,UNLIST  04/12/10    pelvic adhesion    [facility] TOTAL ABDOM HYSTERECTOMY  09/08/2004    TAHRSO, Cystoscopy.    [facility] COLONOSCOPY W BIOPSY  04/26/10    [facility] COLONOSCOPY W/WO BRUSH/WASH  5/25/2005    [facility] UGI ENDOSCOPY, SIMPLE EXAM  5/25/2005     Current Outpatient Medications   Medication Sig Dispense Refill    acetaminophen (TYLENOL) 325 MG tablet Take 3 tablets (975 mg) by mouth every 8 hours as needed for pain 100 tablet 0    levothyroxine (SYNTHROID/LEVOTHROID) 100 MCG tablet TAKE ONE TABLET BY MOUTH ONCE DAILY 90 tablet 0    pseudoePHEDrine (SUDAFED) 120 MG 12 hr tablet Take 120 mg by mouth daily as needed for congestion       rosuvastatin (CRESTOR) 10 MG tablet TAKE ONE TABLET BY MOUTH ONCE DAILY 90 tablet 0    sertraline (ZOLOFT) 100 MG tablet TKAE 1 AND 1/2 TABLETS BY MOUTH ONCE DAILY 135 tablet 3       Allergies   Allergen Reactions    Aspirin Swelling    Erythromycin Ethylsuccinate GI Disturbance     gi upset    Ibuprofen Swelling    Naproxen     Nsaids Swelling    Zelnorm [Tegaserod Maleate]         Social History     Tobacco Use    Smoking status: Every Day     Packs/day: 0.25     Years: 23.00     Additional pack years: 0.00     Total pack years: 5.75     Types: Cigarettes    Smokeless tobacco: Former   Substance Use Topics    Alcohol use: Yes     Alcohol/week: 0.0 standard drinks of alcohol     Comment: nightly     {FAMILY HISTORY (Optional):807124975}  History   Drug Use No         Objective     BP (!) 144/86 (Cuff Size: Adult Large)   Pulse 103   Temp 98.4  F (36.9  C) (Temporal)   Resp 16   Ht 1.524 m (5')   Wt 70.6 kg (155 lb 9.6 oz)   LMP 01/13/2004   SpO2 98%   BMI 30.39 kg/m      Physical Exam  {EXAM Preop Choices:300310}    Recent Labs   Lab Test 09/04/22  1228 06/21/22  0837 02/11/22  0552 02/10/22  0606 02/04/22  0859   HGB 14.2  --  11.2*   < > 14.7     --   --   --  224   NA  --  138  --   --  138   POTASSIUM  --  4.2  --   --  4.3   CR  --  0.51*  --   --  0.62     < > = values in this interval not displayed.        Diagnostics:  {LABS:927714}   {EK}    Revised Cardiac Risk Index (RCRI):  The patient has the following serious cardiovascular risks for perioperative complications:  {PREOP REVISED CARDIAC RISK INDEX (RCRI) :996284}     RCRI Interpretation: {REVISED CARDIAC RISK INTERPRETATION :454931}         Signed Electronically by: Kylie Coffman NP  Copy of this evaluation report is provided to requesting physician.    {Provider Resources  Preop [facility] [facility] Preop Guidelines  Revised Cardiac Risk Index :114751}  Answers submitted by the patient for this visit:  Patient Health Questionnaire (Submitted on 10/12/2023)  If you checked off any problems, how difficult have these problems made it for you to do your work, take care of things at home, or get along with other people?: Somewhat difficult  PHQ9 TOTAL SCORE: 17  KHANG-7 (Submitted on 10/12/2023)  KHANG 7 TOTAL SCORE: 15  Depression / Anxiety Questionnaire (Submitted on 10/12/2023)  Chief Complaint: Chronic problems general questions HPI Form  Depression/Anxiety: Depression & Anxiety  Hypothyroid  (Submitted on 10/12/2023)  Chief Complaint: Chronic problems general questions HPI Form  Since last visit, patient describes the following symptoms:: Weight gain  Depression & Anxiety (Submitted on 10/12/2023)  Chief Complaint: Chronic problems general questions HPI Form  Status since last visit:: worse  Anxiety since last: : worse  Other associated symptoms of depression:: Yes  Other associated symotome: : Yes  Significant life event: : health concerns  Anxious:: Yes  Current substance use:: No  Weight Gain (Submitted on 10/12/2023)  Chief Complaint: Chronic problems general questions HPI Form  Weight gain:: 5 lbs.  General Questionnaire (Submitted on 10/12/2023)  Chief Complaint: Chronic problems general questions HPI Form  How many servings of fruits and vegetables do you eat daily?: 2-3  On  average, how many sweetened beverages do you drink each day (Examples: soda, juice, sweet tea, etc.  Do NOT count diet or artificially sweetened beverages)?: 0  How many minutes a day do you exercise enough to make your heart beat faster?: 30 to 60  How many days a week do you exercise enough to make your heart beat faster?: 7  How many days per week do you miss taking your medication?: 0

## 2023-10-12 NOTE — PROGRESS NOTES
"   SUBJECTIVE:   CC: Elisa is an 58 year old who presents for preventive health visit.       10/12/2023     8:12 AM   Additional Questions   Roomed by Ruth STRICKLAND    Today's PHQ-9 Score:       10/12/2023     7:57 AM   PHQ-9 SCORE   PHQ-9 Total Score MyChart 17 (Moderately severe depression)   PHQ-9 Total Score 17           Social History     Tobacco Use    Smoking status: Every Day     Packs/day: 0.25     Years: 23.00     Additional pack years: 0.00     Total pack years: 5.75     Types: Cigarettes    Smokeless tobacco: Former   Substance Use Topics    Alcohol use: Yes     Alcohol/week: 0.0 standard drinks of alcohol     Comment: nightly             2022     7:55 AM   Alcohol Use   Prescreen: >3 drinks/day or >7 drinks/week? Yes   AUDIT SCORE  7     Reviewed orders with patient.  Reviewed health maintenance and updated orders accordingly - Yes      Breast Cancer Screenin/28/2021     7:04 AM   Breast CA Risk Assessment (FHS-7)   Do you have a family history of breast, colon, or ovarian cancer? No / Unknown     Dec 2022 cut with knif rash started next morning on face- started with big acne pusture- burning- hard to heal.  When gets them has sticky clear glue- relieves pressure.  Getting since December.  On face- arms- had a few on her left arm.  Itchy.   does not get lesions.  Neck is red- is for years.  Redness to neck- feels swollen.  He could not figure out.  Had testingt.      Mood is worse- she has thoughts of self harm but no plan- \"I would never do that\".  Has not seen counseling in years.  Is drinking two large glasses of wine a night.  Does not exercise.  Does not go out of the house much.  Has supportive     Lump on chest- noticed 5 months ago- feels got larger and painful but then a little smaller.    Pertinent mammograms are reviewed under the imaging tab.    History of abnormal Pap smear: Status post benign hysterectomy. Health Maintenance and Surgical History updated.   "   Reviewed and updated as needed this visit by clinical staff   Tobacco  Allergies  Meds              Reviewed and updated as needed this visit by Provider                 Past Medical History:   Diagnosis Date    Anxiety     Benign neoplasm of other specified sites of skin 4/6/2006    Finger - right third finger proximal palmar surface    Degenerative disk disease 12/18/12    anterior exposure of L4-L5 and L5-S1 disk spaces for fusion    Depressive disorder     Gout, unspecified     Other and unspecified ovarian cyst     Unspecified hypothyroidism years    Unspecified intestinal obstruction 02/02/2005    Partial bowel obstruction, Obstipation.      Past Surgical History:   Procedure Laterality Date    ARTHROPLASTY KNEE Left 2/9/2022    Procedure: left total knee replacement;  Surgeon: Sahil Cottrell DO;  Location: [facility]    CARPAL TUNNEL RELEASE RT/LT  2005    right wrist    COLONOSCOPY N/A 8/16/2021    Procedure: COLONOSCOPY, WITH POLYPECTOMY;  Surgeon: Celso Shipley MD;  Location: [facility] GI    EXPLORE SPINE, REMOVE HARDWARE, COMBINED  4/7/2014    Procedure: COMBINED EXPLORE SPINE, REMOVE HARDWARE;  Hardware Removal Lumbar 4-Sacral 1, Exploration of Fusion;  Surgeon: Brenton Ashford MD;  Location: [facility] OR    FUSION LUMBAR ANTERIOR THREE+ LEVELS      HC EXCISION TENDON SHEATH LESION, HAND/FINGR  04/17/2006    Right middle finger flexor sheath MP joint.    HC EXCISION TENDON SHEATH LESION, HAND/FINGR  08/07/06    Right middle finger    [facility] LAPAROSCOPY, SURGICAL; CHOLECYSTECTOMY  4/12/2004    Cholecystectomy, Laparoscopic    HC REMOVAL OF OVARY/TUBE(S)      left ovary removed secondary to cyst    HC REMOVAL OF TONSILS,12+ Y/O      Tonsils 12+y.o.    HC REVISE MEDIAN N/CARPAL TUNNEL SURG  8/22/2005    Left    [facility] LAP,ABD/PERIT/OMENTUM,UNLIST  04/12/10    pelvic adhesion    [facility] TOTAL ABDOM HYSTERECTOMY  09/08/2004    TAHRSAMEERA, Cystoscopy.    [facility] COLONOSCOPY W BIOPSY  04/26/10    [facility] COLONOSCOPY  W/WO BRUSH/WASH  5/25/2005    [facility] UGI ENDOSCOPY, SIMPLE EXAM  5/25/2005       Review of Systems  CONSTITUTIONAL: NEGATIVE for fever, chills, change in weight  INTEGUMENTARY/SKIN: NEGATIVE for worrisome rashes, moles or lesions  EYES: NEGATIVE for vision changes or irritation  ENT: NEGATIVE for ear, mouth and throat problems  RESP: NEGATIVE for significant cough or SOB  BREAST: NEGATIVE for masses, tenderness or discharge  CV: NEGATIVE for chest pain, palpitations or peripheral edema  GI: NEGATIVE for nausea, abdominal pain, heartburn, or change in bowel habits  : NEGATIVE for unusual urinary or vaginal symptoms. No vaginal bleeding.  MUSCULOSKELETAL: NEGATIVE for significant arthralgias or myalgia  NEURO: NEGATIVE for weakness, dizziness or paresthesias  PSYCHIATRIC: NEGATIVE for changes in mood or affect      OBJECTIVE:   BP (!) 144/86 (Cuff Size: Adult Large)   Pulse 103   Temp 98.4  F (36.9  C) (Temporal)   Resp 16   Ht 1.524 m (5')   Wt 70.6 kg (155 lb 9.6 oz)   LMP 01/13/2004   SpO2 98%   BMI 30.39 kg/m    Physical Exam  GENERAL: healthy, alert and no distress  EYES: Eyes grossly normal to inspection, PERRL and conjunctivae and sclerae normal  HENT: ear canals and TM's normal, nose and mouth without ulcers or lesions  NECK: no adenopathy, no asymmetry, masses, or scars and thyroid normal to palpation  RESP: lungs clear to auscultation - no rales, rhonchi or wheezes  CV: regular rate and rhythm, normal S1 S2, no S3 or S4, no murmur, click or rub, no peripheral edema and peripheral pulses strong  ABDOMEN: soft, nontender, no hepatosplenomegaly, no masses and bowel sounds normal  MS: no gross musculoskeletal defects noted, no edema  SKIN: no suspicious lesions or rashes and ulcerated lesions to face, left bicep, a few on chest  Over sternum is hard, nonmobile lesion approx 1 cm- irregular  NEURO: Normal strength and tone, mentation intact and speech normal  PSYCH: mentation appears normal, affect  normal/bright    Diagnostic Test Results:  Labs reviewed in Epic  Results for orders placed or performed in visit on 10/12/23 (from the past 24 hour(s))   CBC with platelets and differential    Narrative    The following orders were created for panel order CBC with platelets and differential.  Procedure                               Abnormality         Status                     ---------                               -----------         ------                     CBC with platelets and d...[[id number]]                      Final result                 Please view results for these tests on the individual orders.   CBC with platelets and differential   Result Value Ref Range    WBC Count 7.7 4.0 - 11.0 10e3/uL    RBC Count 4.90 3.80 - 5.20 10e6/uL    Hemoglobin 15.0 11.7 - 15.7 g/dL    Hematocrit 44.6 35.0 - 47.0 %    MCV 91 78 - 100 fL    MCH 30.6 26.5 - 33.0 pg    MCHC 33.6 31.5 - 36.5 g/dL    RDW 12.2 10.0 - 15.0 %    Platelet Count 214 150 - 450 10e3/uL    % Neutrophils 66 %    % Lymphocytes 25 %    % Monocytes 6 %    Mids % (Monos, Eos, Basos)      % Eosinophils 1 %    % Basophils 1 %    % Immature Granulocytes 1 %    NRBCs per 100 WBC 0 <1 /100    Absolute Neutrophils 5.1 1.6 - 8.3 10e3/uL    Absolute Lymphocytes 1.9 0.8 - 5.3 10e3/uL    Absolute Monocytes 0.5 0.0 - 1.3 10e3/uL    Mids Abs (Monos, Eos, Basos)      Absolute Eosinophils 0.1 0.0 - 0.7 10e3/uL    Absolute Basophils 0.1 0.0 - 0.2 10e3/uL    Absolute Immature Granulocytes 0.1 <=0.4 10e3/uL    Absolute NRBCs 0.0 10e3/uL       ASSESSMENT/PLAN:   (Z00.00) Routine general medical examination at a health care facility  (primary encounter diagnosis)  Comment: recommend yearly physical- declined covid and hep b   Plan: REVIEW OF HEALTH MAINTENANCE PROTOCOL ORDERS,         Adult Mental Health  Referral    (F33.0) Mild episode of recurrent major depressive disorder (H24)  Comment: declines suicide plan.  Referral for counseling.  Add low dose  wellbutrin to her sertraline.  Discussed good self care, sleep hygiene, mindfulness therapies and meditation.  Plan: follow up 8 weeks    (E78.5) Hyperlipidemia LDL goal <130  Comment: no side effects- good control.  Check labs  Plan: REVIEW OF HEALTH MAINTENANCE PROTOCOL ORDERS,         rosuvastatin (CRESTOR) 10 MG tablet    (E03.9) Hypothyroidism, unspecified type  Comment: check labs- takes daily  Plan: REVIEW OF HEALTH MAINTENANCE PROTOCOL ORDERS,         TSH with free T4 reflex, levothyroxine         (SYNTHROID/LEVOTHROID) 100 MCG tablet    (M89.8X8) Mass of sternum  Comment: us  Plan: US Head Neck Soft Tissue    (R03.0) Elevated BP without diagnosis of hypertension  Comment: recheck at home and in office next visit.  Dietary and lifestyle recommenations  Plan: Basic metabolic panel  (Ca, Cl, CO2, Creat,         Gluc, K, Na, BUN)            (D64.9) Anemia, unspecified type  Comment: check labs  Plan: CBC with platelets and differential, Ferritin    (R21) Rash  Comment: trial mupirocin cream, change to sensitive detergent, no dryer sheets  Plan: mupirocin (BACTROBAN) 2 % external ointment      (Z23) Needs flu shot  Comment: update today  Plan: INFLUENZA VACCINE 18-64Y (FLUBLOK)    (Z23) Need for pneumococcal vaccination  Comment: update  Plan: PNEUMOCOCCAL 20 VALENT CONJUGATE (PREVNAR 20)    (Z12.31) Visit for screening mammogram  Comment: screen  Plan: MA Screen Bilateral w/Shar           Patient has been advised of split billing requirements and indicates understanding: Yes    Depression Screening Follow Up        10/12/2023     7:57 AM   PHQ   PHQ-9 Total Score 17   Q9: Thoughts of better off dead/self-harm past 2 weeks More than half the days   F/U: Thoughts of suicide or self-harm Yes   F/U: Self harm-plan No   F/U: Self-harm action No   F/U: Safety concerns No         10/12/2023     7:57 AM   Last PHQ-9   1.  Little interest or pleasure in doing things 3   2.  Feeling down, depressed, or hopeless 3   3.   Trouble falling or staying asleep, or sleeping too much 1   4.  Feeling tired or having little energy 3   5.  Poor appetite or overeating 0   6.  Feeling bad about yourself 3   7.  Trouble concentrating 1   8.  Moving slowly or restless 1   Q9: Thoughts of better off dead/self-harm past 2 weeks 2   PHQ-9 Total Score 17   In the past two weeks have you had thoughts of suicide or self harm? Yes   Do you have concerns about your personal safety or the safety of others? No   In the past 2 weeks have you thought about a plan or had intention to harm yourself? No   In the past 2 weeks have you acted on these thoughts in any way? No     Discussed the following ways the patient can remain in a safe environment:  remove alcohol    COUNSELING:  Reviewed preventive health counseling, as reflected in patient instructions      BMI:   Estimated body mass index is 30.39 kg/m  as calculated from the following:    Height as of this encounter: 1.524 m (5').    Weight as of this encounter: 70.6 kg (155 lb 9.6 oz).   Weight management plan: Discussed healthy diet and exercise guidelines      She reports that she has been smoking. She has a 5.75 pack-year smoking history. She has quit using smokeless tobacco.  Nicotine/Tobacco Cessation Plan:   Information offered: Patient not interested at this time          Kylie Coffman NP  [facility]

## 2023-10-13 ENCOUNTER — MYC MEDICAL ADVICE (OUTPATIENT)
Dept: FAMILY MEDICINE | Facility: CLINIC | Age: 58
End: 2023-10-13
Payer: COMMERCIAL

## 2023-10-13 DIAGNOSIS — E78.5 HYPERLIPIDEMIA LDL GOAL <130: ICD-10-CM

## 2023-10-16 RX ORDER — ROSUVASTATIN CALCIUM 20 MG/1
20 TABLET, COATED ORAL DAILY
Qty: 90 TABLET | Refills: 3 | Status: SHIPPED | OUTPATIENT
Start: 2023-10-16 | End: 2024-10-10

## 2023-10-28 ENCOUNTER — HEALTH MAINTENANCE LETTER (OUTPATIENT)
Age: 58
End: 2023-10-28

## 2023-10-30 ENCOUNTER — HOSPITAL ENCOUNTER (OUTPATIENT)
Dept: MAMMOGRAPHY | Facility: CLINIC | Age: 58
Discharge: HOME | End: 2023-10-30
Attending: NURSE PRACTITIONER
Payer: COMMERCIAL

## 2023-10-30 ENCOUNTER — HOSPITAL ENCOUNTER (OUTPATIENT)
Dept: ULTRASOUND IMAGING | Facility: CLINIC | Age: 58
Discharge: HOME | End: 2023-10-30
Attending: NURSE PRACTITIONER
Payer: COMMERCIAL

## 2023-10-30 DIAGNOSIS — Z12.31 VISIT FOR SCREENING MAMMOGRAM: ICD-10-CM

## 2023-10-30 DIAGNOSIS — M89.8X8 MASS OF STERNUM: ICD-10-CM

## 2023-10-30 PROCEDURE — 76604 US EXAM CHEST: CPT | Mod: 52

## 2023-10-30 PROCEDURE — 77067 SCR MAMMO BI INCL CAD: CPT

## 2023-12-15 DIAGNOSIS — E03.9 HYPOTHYROIDISM, UNSPECIFIED TYPE: ICD-10-CM

## 2023-12-15 RX ORDER — LEVOTHYROXINE SODIUM 100 UG/1
100 TABLET ORAL DAILY
Qty: 90 TABLET | Refills: 0 | Status: SHIPPED | OUTPATIENT
Start: 2023-12-15 | End: 2024-03-11

## 2023-12-15 NOTE — TELEPHONE ENCOUNTER
Medication Question or Refill    Contacts         Type Contact Phone/Fax    12/15/2023 10:02 AM CST Phone (Incoming) Al Elisa BIANCHI (Self) [phone] (H)     Medication refill request - Order from 10/12/23 had been discontinued and pharmacy needs a new order placed  levothyroxine (SYNTHROID/LEVOTHROID) 100 MCG tablet            What medication are you calling about (include dose and sig)?: levothyroxine (SYNTHROID/LEVOTHROID) 100 MCG tablet     Preferred Pharmacy:   [address] [facility]  [address] [facility] [address]  Phone: [phone] Fax: [phone]      Controlled Substance Agreement on file:   CSA -- Patient Level:    CSA: None found at the patient level.       Who prescribed the medication?: Kylie Coffman, NP    Do you need a refill? Yes    When did you use the medication last? 12/15/23    Patient offered an appointment? No    Do you have any questions or concerns?  Yes: Medication will be completely out after 12/15/23 - Needs a new prescription order sent to pharmacy      Could we send this information to you in [employer] or would you prefer to receive a phone call?:   Patient would prefer a phone call   Okay to leave a detailed message?: Yes at Home number on file [phone] (home)

## 2024-01-08 DIAGNOSIS — F33.0 MILD RECURRENT MAJOR DEPRESSION (H): Primary | ICD-10-CM

## 2024-01-09 RX ORDER — BUPROPION HYDROCHLORIDE 150 MG/1
150 TABLET ORAL EVERY MORNING
Qty: 90 TABLET | Refills: 0 | Status: SHIPPED | OUTPATIENT
Start: 2024-01-09 | End: 2024-04-04

## 2024-03-09 DIAGNOSIS — E03.9 HYPOTHYROIDISM, UNSPECIFIED TYPE: ICD-10-CM

## 2024-03-11 RX ORDER — LEVOTHYROXINE SODIUM 100 UG/1
100 TABLET ORAL DAILY
Qty: 90 TABLET | Refills: 1 | Status: SHIPPED | OUTPATIENT
Start: 2024-03-11 | End: 2024-09-09

## 2024-04-04 DIAGNOSIS — F33.0 MILD RECURRENT MAJOR DEPRESSION (H): ICD-10-CM

## 2024-04-04 RX ORDER — BUPROPION HYDROCHLORIDE 150 MG/1
150 TABLET ORAL EVERY MORNING
Qty: 90 TABLET | Refills: 0 | Status: SHIPPED | OUTPATIENT
Start: 2024-04-04 | End: 2024-07-02

## 2024-07-01 DIAGNOSIS — F33.0 MILD RECURRENT MAJOR DEPRESSION (H): ICD-10-CM

## 2024-07-02 RX ORDER — BUPROPION HYDROCHLORIDE 150 MG/1
150 TABLET ORAL EVERY MORNING
Qty: 90 TABLET | Refills: 1 | Status: SHIPPED | OUTPATIENT
Start: 2024-07-02

## 2024-09-08 DIAGNOSIS — E03.9 HYPOTHYROIDISM, UNSPECIFIED TYPE: ICD-10-CM

## 2024-09-09 RX ORDER — LEVOTHYROXINE SODIUM 100 UG/1
100 TABLET ORAL DAILY
Qty: 90 TABLET | Refills: 0 | Status: SHIPPED | OUTPATIENT
Start: 2024-09-09

## 2024-10-12 DIAGNOSIS — F33.0 MILD RECURRENT MAJOR DEPRESSION (H): Primary | ICD-10-CM

## 2024-10-14 RX ORDER — SERTRALINE HYDROCHLORIDE 100 MG/1
TABLET, FILM COATED ORAL
Qty: 135 TABLET | Refills: 0 | Status: SHIPPED | OUTPATIENT
Start: 2024-10-14

## 2024-11-05 DIAGNOSIS — E78.5 HYPERLIPIDEMIA LDL GOAL <130: ICD-10-CM

## 2024-11-06 RX ORDER — ROSUVASTATIN CALCIUM 20 MG/1
20 TABLET, COATED ORAL DAILY
Qty: 90 TABLET | Refills: 3 | Status: SHIPPED | OUTPATIENT
Start: 2024-11-06

## 2024-12-21 ENCOUNTER — HEALTH MAINTENANCE LETTER (OUTPATIENT)
Age: 59
End: 2024-12-21

## 2025-01-06 DIAGNOSIS — E03.9 HYPOTHYROIDISM, UNSPECIFIED TYPE: ICD-10-CM

## 2025-01-06 DIAGNOSIS — F33.0 MILD RECURRENT MAJOR DEPRESSION: ICD-10-CM

## 2025-01-08 RX ORDER — LEVOTHYROXINE SODIUM 100 UG/1
100 TABLET ORAL DAILY
Qty: 30 TABLET | Refills: 0 | Status: SHIPPED | OUTPATIENT
Start: 2025-01-08

## 2025-01-08 RX ORDER — SERTRALINE HYDROCHLORIDE 100 MG/1
TABLET, FILM COATED ORAL
Qty: 135 TABLET | Refills: 0 | Status: SHIPPED | OUTPATIENT
Start: 2025-01-08

## 2025-02-04 DIAGNOSIS — E03.9 HYPOTHYROIDISM, UNSPECIFIED TYPE: ICD-10-CM

## 2025-02-04 RX ORDER — LEVOTHYROXINE SODIUM 100 UG/1
100 TABLET ORAL DAILY
Qty: 20 TABLET | Refills: 0 | Status: SHIPPED | OUTPATIENT
Start: 2025-02-04

## 2025-02-12 ENCOUNTER — TELEPHONE (OUTPATIENT)
Dept: FAMILY MEDICINE | Facility: CLINIC | Age: 60
End: 2025-02-12
Payer: COMMERCIAL

## 2025-02-12 NOTE — TELEPHONE ENCOUNTER
Patient Quality Outreach    Patient is due for the following:   Depression  -  PHQ-9 needed  Physical Preventive Adult Physical      Topic Date Due    Hepatitis B Vaccine (1 of 3 - 19+ 3-dose series) Never done    Flu Vaccine (1) 09/01/2024    COVID-19 Vaccine (1 - 2024-25 season) Never done     Lung Cancer Screening     Action(s) Taken:   Schedule a Adult Preventative  Patient was assigned appropriate questionnaire to complete    Type of outreach:    Sent [facility] [facility] message.    Questions for provider review:    None           Aminta Baez CNA

## 2025-02-19 DIAGNOSIS — F33.0 MILD RECURRENT MAJOR DEPRESSION: ICD-10-CM

## 2025-02-20 RX ORDER — BUPROPION HYDROCHLORIDE 150 MG/1
150 TABLET ORAL EVERY MORNING
Qty: 20 TABLET | Refills: 0 | Status: SHIPPED | OUTPATIENT
Start: 2025-02-20

## 2025-02-26 DIAGNOSIS — F33.0 MILD RECURRENT MAJOR DEPRESSION: ICD-10-CM

## 2025-02-26 DIAGNOSIS — E03.9 HYPOTHYROIDISM, UNSPECIFIED TYPE: ICD-10-CM

## 2025-02-26 RX ORDER — LEVOTHYROXINE SODIUM 100 UG/1
100 TABLET ORAL DAILY
Qty: 20 TABLET | Refills: 0 | OUTPATIENT
Start: 2025-02-26

## 2025-02-28 NOTE — TELEPHONE ENCOUNTER
Patient scheduled appointment.  She is asking if medication can be sent to pharmacy.  Or if she can be worked into providers schedule before appointment date.  Patient will be out of medication on 3/3.  She would like callback.

## 2025-03-02 RX ORDER — SERTRALINE HYDROCHLORIDE 100 MG/1
TABLET, FILM COATED ORAL
Qty: 135 TABLET | Refills: 0 | Status: SHIPPED | OUTPATIENT
Start: 2025-03-02

## 2025-03-02 RX ORDER — BUPROPION HYDROCHLORIDE 150 MG/1
150 TABLET ORAL EVERY MORNING
Qty: 60 TABLET | Refills: 0 | Status: SHIPPED | OUTPATIENT
Start: 2025-03-02

## 2025-03-02 RX ORDER — LEVOTHYROXINE SODIUM 100 UG/1
100 TABLET ORAL DAILY
Qty: 60 TABLET | Refills: 0 | Status: SHIPPED | OUTPATIENT
Start: 2025-03-02

## 2025-03-05 NOTE — TELEPHONE ENCOUNTER
Patient Quality Outreach    Patient is due for the following:   Depression  -  PHQ-9 needed  Physical Preventive Adult Physical      Topic Date Due    Hepatitis B Vaccine (1 of 3 - 19+ 3-dose series) Never done    Flu Vaccine (1) 09/01/2024    COVID-19 Vaccine (1 - 2024-25 season) Never done       Action(s) Taken:   Schedule a Adult Preventative  Patient has upcoming appointment, these items will be addressed at that time.    Type of outreach:    Phone, left message for patient/parent to call back.    Questions for provider review:    None           Aminta Baez CNA

## 2025-03-27 ENCOUNTER — TELEPHONE (OUTPATIENT)
Dept: FAMILY MEDICINE | Facility: CLINIC | Age: 60
End: 2025-03-27
Payer: COMMERCIAL

## 2025-03-27 NOTE — LETTER
March 27, 2025    To  Elisa Al  [address] [facility] [address]    Your team at [facility] cares about your health. We have reviewed your chart and based on our findings; we are making the following recommendations to better manage your health.     You are in particular need of attention regarding the following:     Complete the attached questionnaires to ensure your mental health needs are being properly met.  Please fill them out and send back to us via [employer], and feel free to call us with any questions or concerns at [phone].    PREVENTATIVE VISIT: Physical    If you have already completed these items, please contact the clinic via phone or   [employer] so your care team can review and update your records. Thank you for   choosing [facility] for your healthcare needs. For any questions,   concerns, or to schedule an appointment please contact our clinic.    Healthy Regards,      Your [facility] Care Team            Electronically signed

## 2025-03-27 NOTE — TELEPHONE ENCOUNTER
Patient Quality Outreach    Patient is due for the following:   Depression  -  PHQ-9 needed  Physical Preventive Adult Physical    Action(s) Taken:   Schedule a Adult Preventative    Type of outreach:    Sent letter.    Questions for provider review:    None         Ruth Vera MA  Chart routed to Care Team.

## 2025-04-22 ENCOUNTER — TELEPHONE (OUTPATIENT)
Dept: FAMILY MEDICINE | Facility: CLINIC | Age: 60
End: 2025-04-22

## 2025-04-22 ENCOUNTER — VIRTUAL VISIT (OUTPATIENT)
Dept: FAMILY MEDICINE | Facility: CLINIC | Age: 60
End: 2025-04-22
Payer: COMMERCIAL

## 2025-04-22 DIAGNOSIS — F33.0 MILD RECURRENT MAJOR DEPRESSION: Primary | ICD-10-CM

## 2025-04-22 DIAGNOSIS — Z87.891 PERSONAL HISTORY OF TOBACCO USE, PRESENTING HAZARDS TO HEALTH: ICD-10-CM

## 2025-04-22 DIAGNOSIS — E78.5 HYPERLIPIDEMIA LDL GOAL <130: ICD-10-CM

## 2025-04-22 DIAGNOSIS — M25.50 POLYARTHRALGIA: ICD-10-CM

## 2025-04-22 DIAGNOSIS — E03.9 HYPOTHYROIDISM, UNSPECIFIED TYPE: ICD-10-CM

## 2025-04-22 PROCEDURE — 96127 BRIEF EMOTIONAL/BEHAV ASSMT: CPT | Mod: 93 | Performed by: NURSE PRACTITIONER

## 2025-04-22 PROCEDURE — 98013 SYNCH AUDIO-ONLY EST LOW 20: CPT | Performed by: NURSE PRACTITIONER

## 2025-04-22 RX ORDER — ROSUVASTATIN CALCIUM 20 MG/1
20 TABLET, COATED ORAL DAILY
Qty: 90 TABLET | Refills: 3 | Status: SHIPPED | OUTPATIENT
Start: 2025-04-22

## 2025-04-22 RX ORDER — SERTRALINE HYDROCHLORIDE 100 MG/1
TABLET, FILM COATED ORAL
Qty: 135 TABLET | Refills: 3 | Status: SHIPPED | OUTPATIENT
Start: 2025-04-22

## 2025-04-22 RX ORDER — BUPROPION HYDROCHLORIDE 150 MG/1
150 TABLET ORAL EVERY MORNING
Qty: 90 TABLET | Refills: 3 | Status: SHIPPED | OUTPATIENT
Start: 2025-04-22

## 2025-04-22 RX ORDER — LEVOTHYROXINE SODIUM 100 UG/1
100 TABLET ORAL DAILY
Qty: 90 TABLET | Refills: 3 | Status: SHIPPED | OUTPATIENT
Start: 2025-04-22

## 2025-04-22 ASSESSMENT — PATIENT HEALTH QUESTIONNAIRE - PHQ9
SUM OF ALL RESPONSES TO PHQ QUESTIONS 1-9: 0
SUM OF ALL RESPONSES TO PHQ QUESTIONS 1-9: 0
10. IF YOU CHECKED OFF ANY PROBLEMS, HOW DIFFICULT HAVE THESE PROBLEMS MADE IT FOR YOU TO DO YOUR WORK, TAKE CARE OF THINGS AT HOME, OR GET ALONG WITH OTHER PEOPLE: NOT DIFFICULT AT ALL

## 2025-04-22 NOTE — TELEPHONE ENCOUNTER
Patient states someone called her from clinic.  Unsure if was for her 12 noon appointment.  She missed call.  Did teams staff to let them know.  Did verify call back number is correct.  Ada COLBERT Rn

## 2025-04-22 NOTE — PROGRESS NOTES
Elisa is a 59 year old who is being evaluated via a billable telephone visit.    What phone number would you like to be contacted at? [phone]   How would you like to obtain your AVS? Amadeo  Originating Location (pt. Location): Home    Distant Location (provider location):  On-site  Telephone visit completed due to the patient did not have access to video, while the distant provider did.    Assessment & Plan     Mild recurrent major depression  Well controlled with addition of wellbutrin last year.  PHQ-9 is 0.  Continue medications.  Discussed good self care- nutrition/ exercise, sleep hygiene, mindfulness therapies and meditation.   - sertraline (ZOLOFT) 100 MG tablet; TAKE ONE AND ONE-HALF TABLETS BY MOUTH ONCE DAILY  - buPROPion (WELLBUTRIN XL) 150 MG 24 hr tablet; Take 1 tablet (150 mg) by mouth every morning.    Hyperlipidemia LDL goal <130  Stable on statin- check fasting labs  - rosuvastatin (CRESTOR) 20 MG tablet; Take 1 tablet (20 mg) by mouth daily.  - Lipid panel reflex to direct LDL Fasting; Future    Hypothyroidism, unspecified type  Check labs.  Stable on medication  - levothyroxine (SYNTHROID/LEVOTHROID) 100 MCG tablet; Take 1 tablet (100 mcg) by mouth daily.  - TSH with free T4 reflex; Future  - Basic metabolic panel  (Ca, Cl, CO2, Creat, Gluc, K, Na, BUN); Future    Polyarthralgia  Multiple joint pain with nodules on hands per her report- also shoulder.  Family history Rheumatoid arthritis.  Will check basic labs.  Referral Ortho  - Anti Nuclear Dot IgG by IFA with Reflex; Future  - Uric acid; Future  - Rheumatoid factor; Future  - Orthopedic  Referral; Future    Personal history of tobacco use, presenting hazards to health  Declines assistance with cessation          Nicotine/Tobacco Cessation  She reports that she has been smoking. She has a 5.8 pack-year smoking history. She has quit using smokeless tobacco.  Nicotine/Tobacco Cessation Plan  Information offered: Patient not  interested at this time          Shant Pérez is a 59 year old, presenting for the following health issues:  Recheck Medication and Arthritis (Concerns of Arthritis )      4/22/2025    11:29 AM   Additional Questions   Roomed by Amy     Arthritis    History of Present Illness       Reason for visit:  Arthritis concerns  Symptom onset:  More than a month  Symptoms include:  Pain in joints, hands, shoulders  Symptom intensity:  Moderate  Symptom progression:  Staying the same  Had these symptoms before:  No   She is taking medications regularly.            Review of Systems  Constitutional, HEENT, cardiovascular, pulmonary, GI, , musculoskeletal, neuro, skin, endocrine and psych systems are negative, except as otherwise noted.      Objective           Vitals:  No vitals were obtained today due to virtual visit.    Physical Exam   General: Alert and no distress //Respiratory: No audible wheeze, cough, or shortness of breath // Psychiatric:  Appropriate affect, tone, and pace of words      No results found for this or any previous visit (from the past 24 hours).      Phone call duration: 8 minutes  Signed Electronically by: Kylie Coffman NP

## 2025-04-23 ENCOUNTER — PATIENT OUTREACH (OUTPATIENT)
Dept: CARE COORDINATION | Facility: CLINIC | Age: 60
End: 2025-04-23
Payer: COMMERCIAL

## 2025-04-28 ENCOUNTER — LAB (OUTPATIENT)
Dept: LAB | Facility: CLINIC | Age: 60
End: 2025-04-28
Payer: COMMERCIAL

## 2025-04-28 DIAGNOSIS — E78.5 HYPERLIPIDEMIA LDL GOAL <130: ICD-10-CM

## 2025-04-28 DIAGNOSIS — M25.50 POLYARTHRALGIA: ICD-10-CM

## 2025-04-28 DIAGNOSIS — E03.9 HYPOTHYROIDISM, UNSPECIFIED TYPE: ICD-10-CM

## 2025-04-28 LAB
ANION GAP SERPL CALCULATED.3IONS-SCNC: 11 MMOL/L (ref 7–15)
BUN SERPL-MCNC: 13.4 MG/DL (ref 8–23)
CALCIUM SERPL-MCNC: 9.1 MG/DL (ref 8.8–10.4)
CHLORIDE SERPL-SCNC: 101 MMOL/L (ref 98–107)
CHOLEST SERPL-MCNC: 214 MG/DL
CREAT SERPL-MCNC: 0.65 MG/DL (ref 0.51–0.95)
EGFRCR SERPLBLD CKD-EPI 2021: >90 ML/MIN/1.73M2
FASTING STATUS PATIENT QL REPORTED: YES
FASTING STATUS PATIENT QL REPORTED: YES
GLUCOSE SERPL-MCNC: 97 MG/DL (ref 70–99)
HCO3 SERPL-SCNC: 27 MMOL/L (ref 22–29)
HDLC SERPL-MCNC: 87 MG/DL
LDLC SERPL CALC-MCNC: 75 MG/DL
NONHDLC SERPL-MCNC: 127 MG/DL
POTASSIUM SERPL-SCNC: 4 MMOL/L (ref 3.4–5.3)
RHEUMATOID FACT SERPL-ACNC: <10 IU/ML
SODIUM SERPL-SCNC: 139 MMOL/L (ref 135–145)
TRIGL SERPL-MCNC: 259 MG/DL
TSH SERPL DL<=0.005 MIU/L-ACNC: 1.87 UIU/ML (ref 0.3–4.2)
URATE SERPL-MCNC: 5.2 MG/DL (ref 2.4–5.7)

## 2025-04-28 PROCEDURE — 84550 ASSAY OF BLOOD/URIC ACID: CPT

## 2025-04-28 PROCEDURE — 86038 ANTINUCLEAR ANTIBODIES: CPT

## 2025-04-28 PROCEDURE — 86431 RHEUMATOID FACTOR QUANT: CPT

## 2025-04-28 PROCEDURE — 84443 ASSAY THYROID STIM HORMONE: CPT

## 2025-04-28 PROCEDURE — 86039 ANTINUCLEAR ANTIBODIES (ANA): CPT

## 2025-04-28 PROCEDURE — 80061 LIPID PANEL: CPT

## 2025-04-28 PROCEDURE — 80048 BASIC METABOLIC PNL TOTAL CA: CPT

## 2025-04-28 PROCEDURE — 36415 COLL VENOUS BLD VENIPUNCTURE: CPT

## 2025-04-29 LAB
ANA PAT SER IF-IMP: ABNORMAL
ANA SER QL IF: ABNORMAL
ANA TITR SER IF: ABNORMAL {TITER}

## (undated) DEVICE — GLOVE PROTEXIS W/NEU-THERA 7.0  2D73TE70

## (undated) DEVICE — GLOVE PROTEXIS W/NEU-THERA 7.5  2D73TE75

## (undated) DEVICE — SUCTION IRR SYSTEM W/O TIP INTERPULSE HANDPIECE 0210-100-000

## (undated) DEVICE — KIT ATTUNE EPAK PIN SYSTEM 2544-00-111

## (undated) DEVICE — DRAPE EXTREMITY W/ARMBOARD 29405

## (undated) DEVICE — DRAPE CONVERTORS U-DRAPE 60X72" 8476

## (undated) DEVICE — BLADE SAW OSCIL/SAG STRK 11X90X1.19MM 4/2000 4111-119-090

## (undated) DEVICE — SOL WATER IRRIG 1000ML BOTTLE 07139-09

## (undated) DEVICE — NDL COUNTER 40CT  31142311

## (undated) DEVICE — CAST PADDING 6" UNSTERILE 9046

## (undated) DEVICE — ADH SKIN CLOSURE PREMIERPRO EXOFIN 1.0ML 3470

## (undated) DEVICE — TOURNIQUET CUFF 34"

## (undated) DEVICE — BONE CLEANING TIP INTERPULSE  0210-010-000

## (undated) DEVICE — SU VICRYL 2-0 CP-2 18" UND J762D

## (undated) DEVICE — DRAPE POUCH INSTRUMENT 1018

## (undated) DEVICE — PREP CHLORAPREP 26ML TINTED ORANGE  260815

## (undated) DEVICE — SUCTION TIP YANKAUER ORTHO SUPER SUCKER EFS-111

## (undated) DEVICE — BNDG ESMARK 6" STERILE

## (undated) DEVICE — GLOVE PROTEXIS BLUE W/NEU-THERA 8.0  2D73EB80

## (undated) DEVICE — DRAPE U SPLIT 74X120" 29440

## (undated) DEVICE — BLADE SAW SAGITTAL STRK 18X90X1.19MM HD SYS 6 6118-119-090

## (undated) DEVICE — BONE CEMENT MIXING BOWL W/SPATULA

## (undated) DEVICE — SU VICRYL 0 OS-6 18" UND J754T

## (undated) DEVICE — ESU PENCIL SMOKE EVAC W/ROCKER SWITCH 0703-047-000

## (undated) DEVICE — SOL NACL 0.9% IRRIG 3000ML BAG 07972-08

## (undated) DEVICE — SOL NACL 0.9% IRRIG 1000ML BOTTLE 07138-09

## (undated) DEVICE — PEN MARKING SKIN

## (undated) DEVICE — PACK TOTAL JOINT STD LATEX

## (undated) DEVICE — GLOVE ESTEEM BLUE W/NEU-THERA 7.5  2D73PB75

## (undated) DEVICE — HOOD FLYTE 0408-800-000

## (undated) DEVICE — CAST PADDING 6" WEBRIL STERILE

## (undated) RX ORDER — FENTANYL CITRATE 50 UG/ML
INJECTION, SOLUTION INTRAMUSCULAR; INTRAVENOUS
Status: DISPENSED
Start: 2022-02-09

## (undated) RX ORDER — PROPOFOL 10 MG/ML
INJECTION, EMULSION INTRAVENOUS
Status: DISPENSED
Start: 2022-02-09

## (undated) RX ORDER — HYDROMORPHONE HYDROCHLORIDE 1 MG/ML
INJECTION, SOLUTION INTRAMUSCULAR; INTRAVENOUS; SUBCUTANEOUS
Status: DISPENSED
Start: 2022-02-09

## (undated) RX ORDER — BUPIVACAINE HYDROCHLORIDE 2.5 MG/ML
INJECTION, SOLUTION EPIDURAL; INFILTRATION; INTRACAUDAL
Status: DISPENSED
Start: 2022-02-09

## (undated) RX ORDER — ONDANSETRON 2 MG/ML
INJECTION INTRAMUSCULAR; INTRAVENOUS
Status: DISPENSED
Start: 2022-02-09

## (undated) RX ORDER — DEXAMETHASONE SODIUM PHOSPHATE 10 MG/ML
INJECTION, SOLUTION INTRAMUSCULAR; INTRAVENOUS
Status: DISPENSED
Start: 2022-02-09

## (undated) RX ORDER — LIDOCAINE HYDROCHLORIDE 20 MG/ML
INJECTION, SOLUTION EPIDURAL; INFILTRATION; INTRACAUDAL; PERINEURAL
Status: DISPENSED
Start: 2022-02-09